# Patient Record
Sex: FEMALE | Race: WHITE | Employment: OTHER | URBAN - METROPOLITAN AREA
[De-identification: names, ages, dates, MRNs, and addresses within clinical notes are randomized per-mention and may not be internally consistent; named-entity substitution may affect disease eponyms.]

---

## 2017-01-09 ENCOUNTER — OFFICE VISIT (OUTPATIENT)
Dept: FAMILY MEDICINE | Facility: CLINIC | Age: 67
End: 2017-01-09
Payer: COMMERCIAL

## 2017-01-09 VITALS
OXYGEN SATURATION: 99 % | SYSTOLIC BLOOD PRESSURE: 112 MMHG | RESPIRATION RATE: 16 BRPM | HEART RATE: 67 BPM | HEIGHT: 58 IN | WEIGHT: 146.5 LBS | BODY MASS INDEX: 30.75 KG/M2 | DIASTOLIC BLOOD PRESSURE: 70 MMHG | TEMPERATURE: 97.8 F

## 2017-01-09 DIAGNOSIS — R79.89 INCREASED PTH LEVEL: ICD-10-CM

## 2017-01-09 DIAGNOSIS — Z98.84 BARIATRIC SURGERY STATUS: ICD-10-CM

## 2017-01-09 DIAGNOSIS — E55.9 VITAMIN D DEFICIENCY: ICD-10-CM

## 2017-01-09 DIAGNOSIS — M19.90 ARTHRITIS: ICD-10-CM

## 2017-01-09 DIAGNOSIS — R21 RASH: ICD-10-CM

## 2017-01-09 DIAGNOSIS — F11.20 UNCOMPLICATED OPIOID DEPENDENCE (H): Primary | ICD-10-CM

## 2017-01-09 DIAGNOSIS — E53.8 VITAMIN B12 DEFICIENCY WITHOUT ANEMIA: ICD-10-CM

## 2017-01-09 DIAGNOSIS — Z23 NEED FOR PNEUMOCOCCAL VACCINATION: ICD-10-CM

## 2017-01-09 DIAGNOSIS — G89.4 CHRONIC PAIN SYNDROME: ICD-10-CM

## 2017-01-09 LAB
ALBUMIN SERPL-MCNC: 3.9 G/DL (ref 3.4–5)
ALP SERPL-CCNC: 102 U/L (ref 40–150)
ALT SERPL W P-5'-P-CCNC: 17 U/L (ref 0–50)
ANION GAP SERPL CALCULATED.3IONS-SCNC: 9 MMOL/L (ref 3–14)
AST SERPL W P-5'-P-CCNC: 16 U/L (ref 0–45)
BILIRUB SERPL-MCNC: 0.7 MG/DL (ref 0.2–1.3)
BUN SERPL-MCNC: 12 MG/DL (ref 7–30)
CALCIUM SERPL-MCNC: 9.1 MG/DL (ref 8.5–10.1)
CHLORIDE SERPL-SCNC: 107 MMOL/L (ref 94–109)
CO2 SERPL-SCNC: 25 MMOL/L (ref 20–32)
CREAT SERPL-MCNC: 0.56 MG/DL (ref 0.52–1.04)
ERYTHROCYTE [DISTWIDTH] IN BLOOD BY AUTOMATED COUNT: 14 % (ref 10–15)
GFR SERPL CREATININE-BSD FRML MDRD: NORMAL ML/MIN/1.7M2
GLUCOSE SERPL-MCNC: 88 MG/DL (ref 70–99)
HCT VFR BLD AUTO: 36.2 % (ref 35–47)
HGB BLD-MCNC: 11.5 G/DL (ref 11.7–15.7)
MCH RBC QN AUTO: 29.1 PG (ref 26.5–33)
MCHC RBC AUTO-ENTMCNC: 31.8 G/DL (ref 31.5–36.5)
MCV RBC AUTO: 92 FL (ref 78–100)
PLATELET # BLD AUTO: 366 10E9/L (ref 150–450)
POTASSIUM SERPL-SCNC: 3.8 MMOL/L (ref 3.4–5.3)
PROT SERPL-MCNC: 6.8 G/DL (ref 6.8–8.8)
PTH-INTACT SERPL-MCNC: 73 PG/ML (ref 12–72)
RBC # BLD AUTO: 3.95 10E12/L (ref 3.8–5.2)
SODIUM SERPL-SCNC: 141 MMOL/L (ref 133–144)
VIT B12 SERPL-MCNC: 306 PG/ML (ref 193–986)
WBC # BLD AUTO: 4.3 10E9/L (ref 4–11)

## 2017-01-09 PROCEDURE — 99214 OFFICE O/P EST MOD 30 MIN: CPT | Mod: 25 | Performed by: FAMILY MEDICINE

## 2017-01-09 PROCEDURE — 82607 VITAMIN B-12: CPT | Mod: 90 | Performed by: FAMILY MEDICINE

## 2017-01-09 PROCEDURE — 99000 SPECIMEN HANDLING OFFICE-LAB: CPT | Performed by: FAMILY MEDICINE

## 2017-01-09 PROCEDURE — 82306 VITAMIN D 25 HYDROXY: CPT | Mod: 90 | Performed by: FAMILY MEDICINE

## 2017-01-09 PROCEDURE — 85027 COMPLETE CBC AUTOMATED: CPT | Performed by: FAMILY MEDICINE

## 2017-01-09 PROCEDURE — 80053 COMPREHEN METABOLIC PANEL: CPT | Performed by: FAMILY MEDICINE

## 2017-01-09 PROCEDURE — G0009 ADMIN PNEUMOCOCCAL VACCINE: HCPCS | Performed by: FAMILY MEDICINE

## 2017-01-09 PROCEDURE — 90670 PCV13 VACCINE IM: CPT | Performed by: FAMILY MEDICINE

## 2017-01-09 PROCEDURE — 36415 COLL VENOUS BLD VENIPUNCTURE: CPT | Performed by: FAMILY MEDICINE

## 2017-01-09 PROCEDURE — 83970 ASSAY OF PARATHORMONE: CPT | Mod: 90 | Performed by: FAMILY MEDICINE

## 2017-01-09 RX ORDER — OXYCODONE HYDROCHLORIDE 5 MG/1
5 CAPSULE ORAL EVERY 4 HOURS PRN
Qty: 60 CAPSULE | Refills: 0 | Status: SHIPPED | OUTPATIENT
Start: 2017-01-09 | End: 2017-01-09

## 2017-01-09 RX ORDER — OXYCODONE HYDROCHLORIDE 5 MG/1
5 CAPSULE ORAL EVERY 4 HOURS PRN
Qty: 60 CAPSULE | Refills: 0 | Status: SHIPPED | OUTPATIENT
Start: 2017-02-09 | End: 2017-01-09

## 2017-01-09 RX ORDER — OXYCODONE HYDROCHLORIDE 5 MG/1
5 CAPSULE ORAL EVERY 4 HOURS PRN
Qty: 60 CAPSULE | Refills: 0 | Status: SHIPPED | OUTPATIENT
Start: 2017-03-09 | End: 2017-04-06

## 2017-01-09 RX ORDER — MELOXICAM 7.5 MG/1
7.5 TABLET ORAL
Qty: 30 TABLET | Refills: 1 | Status: SHIPPED | OUTPATIENT
Start: 2017-01-09 | End: 2018-10-17 | Stop reason: SINTOL

## 2017-01-09 ASSESSMENT — PAIN SCALES - GENERAL: PAINLEVEL: SEVERE PAIN (7)

## 2017-01-09 NOTE — MR AVS SNAPSHOT
"              After Visit Summary   1/9/2017    Keiko Kimball    MRN: [id number]           Patient Information     Date Of Birth          1950        Visit Information        Provider Department      1/9/2017 10:00 AM Brittnee Sharma MD [facility]        Today's Diagnoses     Uncomplicated opioid dependence (H)    -  1     Arthritis         Bariatric surgery status         Chronic pain syndrome         Vitamin B12 deficiency without anemia         Vitamin D deficiency         Need for pneumococcal vaccination            Follow-ups after your visit        Who to contact     If you have questions or need follow up information about today's clinic visit or your schedule please contact [facility] directly at [phone].  Normal or non-critical lab and imaging results will be communicated to you by MyChart, letter or phone within 4 business days after the clinic has received the results. If you do not hear from us within 7 days, please contact the clinic through [employer] or phone. If you have a critical or abnormal lab result, we will notify you by phone as soon as possible.  Submit refill requests through [employer] or call your pharmacy and they will forward the refill request to us. Please allow 3 business days for your refill to be completed.          Additional Information About Your Visit        MyChart Information     [employer] lets you send messages to your doctor, view your test results, renew your prescriptions, schedule appointments and more. To sign up, go to [email or web address] . Click on \"Log in\" on the left side of the screen, which will take you to the Welcome page. Then click on \"Sign up Now\" on the right side of the page.     You will be asked to enter the access code listed below, as well as some personal information. Please follow the directions to create your username and password.     Your access code is: [email or web address]  Expires: 1/22/2017  9:54 AM   " "  Your access code will  in 90 days. If you need help or a new code, please call your [place] clinic or [phone].        Care EveryWhere ID     This is your Care EveryWhere ID. This could be used by other organizations to access your [place] medical records  [id number]        Your Vitals Were     Pulse Temperature Respirations Height BMI (Body Mass Index) Pulse Oximetry    67 97.8  F (36.6  C) (Oral) 16 4' 9.5\" (1.461 m) 31.13 kg/m2 99%       Blood Pressure from Last 3 Encounters:   17 112/70   10/24/16 110/72   16 100/74    Weight from Last 3 Encounters:   17 146 lb 8 oz (66.452 kg)   10/24/16 145 lb 4.8 oz (65.908 kg)   16 151 lb (68.493 kg)              We Performed the Following     CBC with platelets     Comprehensive metabolic panel     Parathyroid Hormone Intact     PNEUMOCOCCAL CONJ VACCINE 13 VALENT IM     Vitamin B12     Vitamin D Deficiency          Today's Medication Changes          These changes are accurate as of: 17 10:41 AM.  If you have any questions, ask your nurse or doctor.               Start taking these medicines.        Dose/Directions    oxyCODONE 5 MG capsule   Commonly known as:  OXY-IR   Used for:  Chronic pain syndrome   Started by:  Brittnee Sharma MD        Dose:  5 mg   Start taking on:  3/9/2017   Take 1 capsule (5 mg) by mouth every 4 hours as needed for moderate to severe pain   Quantity:  60 capsule   Refills:  0            Where to get your medicines      These medications were sent to [facility] [address] [place] [address] AT [place] [address]  [id number] [place] [id number]    Hours:  24-hours Phone:  [phone]    - meloxicam 7.5 MG tablet      Some of these will need a paper prescription and others can be bought over the counter.  Ask your nurse if you have questions.     Bring a paper prescription for each of these medications    - oxyCODONE 5 MG capsule             Primary " Care Provider Office Phone # Fax #    Brittnee Soha Sharma [phone]       [facility] [address]  [place]   [facility] [address]        Thank you!     Thank you for choosing [facility]  for your care. Our goal is always to provide you with excellent care. Hearing back from our patients is one way we can continue to improve our services. Please take a few minutes to complete the written survey that you may receive in the mail after your visit with us. Thank you!             Your Updated Medication List - Protect others around you: Learn how to safely use, store and throw away your medicines at www.disposemymeds.org.          This list is accurate as of: 1/9/17 10:41 AM.  Always use your most recent med list.                   Brand Name Dispense Instructions for use    cholecalciferol 1000 UNIT tablet    vitamin D     Take 2,000 Units by mouth 2 times daily       cyanocobalamin 1000 MCG/ML injection    VITAMIN B12    1 mL    Inject 1 mL (1,000 mcg) into the muscle every 30 days       cyclobenzaprine 10 MG tablet    FLEXERIL    180 tablet    Take 1 tablet (10 mg) by mouth nightly as needed Pt takes 10mg to 20mg as needed at bedtime       diclofenac 1 % Gel topical gel    VOLTAREN    100 g    Apply 2 g topically 4 times daily Apply 4 grams to knees or 2 grams to hands four times daily using enclosed dosing card.       lisinopril 10 MG tablet    PRINIVIL/ZESTRIL    90 tablet    Take 1 tablet (10 mg) by mouth daily       meloxicam 7.5 MG tablet    MOBIC    30 tablet    Take 1 tablet (7.5 mg) by mouth every 36 hours Or as needed, not more then 2 times per week       omeprazole 40 MG capsule    priLOSEC    90 capsule    Take 1 capsule (40 mg) by mouth daily Take 30-60 minutes before a meal.       ONE-A-DAY WITHIN Tabs      Take  by mouth 2 times daily.       order for DME     1 each    One lift-chair for mobilization to use daily.       order for DME     2 each    Equipment  being ordered:  [facility] Walking Meghana Murphy~        oxyCODONE 5 MG capsule   Start taking on:  3/9/2017    OXY-IR    60 capsule    Take 1 capsule (5 mg) by mouth every 4 hours as needed for moderate to severe pain

## 2017-01-09 NOTE — NURSING NOTE
"Chief Complaint   Patient presents with     Recheck Medication     OXYCODONE AND MOBIC      Imm/Inj     B-12 and Pneumonia 13     Initial /70 mmHg  Pulse 67  Temp(Src) 97.8  F (36.6  C) (Oral)  Resp 16  Ht 4' 9.5\" (1.461 m)  Wt 146 lb 8 oz (66.452 kg)  BMI 31.13 kg/m2  SpO2 99% Estimated body mass index is 31.13 kg/(m^2) as calculated from the following:    Height as of this encounter: 4' 9.5\" (1.461 m).    Weight as of this encounter: 146 lb 8 oz (66.452 kg).  BP completed using cuff size regular left arm.    Lisa Magill, CMA    "

## 2017-01-09 NOTE — PROGRESS NOTES
HPI   SUBJECTIVE:                                                    Keiko Kimball is a 66 year old female who presents to clinic today for the following health issues:      Chronic Pain Follow-Up       Type / Location of Pain: ALL OVER   Analgesia/pain control:       Recent changes:  worse      Overall control: Comfortably manageable  Activity level/function:      Daily activities:  Able to do all daily activities    Work:  Unable to work  Adverse effects:  No  Adherance    Taking medication as directed?  Yes    Participating in other treatments: no   Risk Factors:    Sleep:  Poor    Mood/anxiety:  controlled    Recent family or social stressors:  Yes-WHERE PATIENT LEAVES     Other aggravating factors: repetitive activities - standing   PHQ-9 SCORE 9/10/2015 10/5/2015   Total Score 0 8     NEETA-7 SCORE 9/10/2015 10/5/2015 10/24/2016   Total Score 0 0 0     Encounter-Level CSA - 7/10/15:               Controlled Substance Agreement - Scan on 7/15/2015  3:09 PM : [facility] Controlled Substance Agreement 7-10-15 (below)             Pain medication is helping. Does not want to change medications at this time. Notes pain is worse in the winter months due to weather.  Notes of joint pain in her shoulders, knees and elbows. Notes she also has rotator cuff issues. Notes she is taking her vitamin d daily. Would like to have labs drawn to check levels.        Amount of exercise or physical activity: None    Problems taking medications regularly: No    Medication side effects: none    Diet: regular (no restrictions)    PROBLEMS TO ADD ON...  Mobic: only taking once a while and will take half a tablet. She thinks she is only taking about one tablet a month.   Is still taking prilosec daily to help with stomach issues.   Stomach issues: is not having episodes of throwing up currently. Notes that her surgeon would like her to have another scope but she does not want to have this done. Reports of history of stomach  "ulcers. Is taking prilosec daily in the morning.     Problem list and histories reviewed & adjusted, as indicated.  Additional history: as documented    BP Readings from Last 3 Encounters:   01/09/17 112/70   10/24/16 110/72   06/30/16 100/74    Wt Readings from Last 3 Encounters:   01/09/17 66.452 kg (146 lb 8 oz)   10/24/16 65.908 kg (145 lb 4.8 oz)   06/30/16 68.493 kg (151 lb)                  Labs reviewed in EPIC  Problem list, Medication list, Allergies, and Medical/Social/Surgical histories reviewed in [facility] and updated as appropriate.    ROS:  CONSTITUTIONAL:NEGATIVE for fever, chills, change in weight  INTEGUMENTARY/SKIN: NEGATIVE for worrisome rashes, moles or lesions  GI: POSITIVE for Hx stomach or duodenal ulcer and vomiting  MUSCULOSKELETAL: POSITIVE  for joint pain, shoulder, knee, and elbow pain.   PSYCHIATRIC: NEGATIVE for changes in mood or affect    This document serves as a record of the services and decisions personally performed and made by Brittnee Sharma MD. It was created on her behalf by Luly Wan, a trained medical scribe. The creation of this document is based the provider's statements to the medical scribe.  Luly Wan January 9, 2017 10:27 AM    OBJECTIVE:                                                    /70 mmHg  Pulse 67  Temp(Src) 97.8  F (36.6  C) (Oral)  Resp 16  Ht 1.461 m (4' 9.5\")  Wt 66.452 kg (146 lb 8 oz)  BMI 31.13 kg/m2  SpO2 99%  Body mass index is 31.13 kg/(m^2).  GENERAL: healthy, alert and no distress  EYES: Eyes grossly normal to inspection, PERRL and conjunctivae and sclerae normal  HENT: ear canals and TM's normal, nose and mouth without ulcers or lesions  NECK: no adenopathy, no asymmetry, masses, or scars and thyroid normal to palpation  RESP: lungs clear to auscultation - no rales, rhonchi or wheezes  CV: regular rate and rhythm, normal S1 S2, no S3 or S4, no murmur, click or rub, no peripheral edema and peripheral pulses strong  MS: no gross " musculoskeletal defects noted, no edema  SKIN: no suspicious lesions or rashes  NEURO: Normal strength and tone, mentation intact and speech normal  PSYCH: mentation appears normal, affect normal/bright    Diagnostic Test Results:  none      ASSESSMENT/PLAN:                                                    (F11.20) Uncomplicated opioid dependence (H)  (primary encounter diagnosis)  Comment: Patient is dependent on oxycodone for pain. 5 mg capsules- 60 tablets per month.   Plan: Follow up as needed.     (M19.90) Arthritis  Comment: Continue with very limited use mobic.   Plan: meloxicam (MOBIC) 7.5 MG tablet, Comprehensive         metabolic panel        Follow up as needed.     (Z98.84) Bariatric surgery status  Comment: Will check vitamin levels. Continue with vitamin D daily. B12 injection today. Results can be faxed to her surgeon (Dr. Feng with [facility]).   Plan: Vitamin D Deficiency, Vitamin B12, Parathyroid         Hormone Intact, Comprehensive metabolic panel        Follow up in six months.     (G89.4) Chronic pain syndrome  Comment: Pain managed under current regime. Refilled for three months.   Plan: oxyCODONE (OXY-IR) 5 MG capsule, DISCONTINUED:         oxyCODONE (OXY-IR) 5 MG capsule, DISCONTINUED:         oxyCODONE (OXY-IR) 5 MG capsule        Follow up in three months.     (E53.8) Vitamin B12 deficiency without anemia  Comment: Will recheck levels. B12 injection today.   Plan: Vitamin B12, CBC with platelets, ADMIN 1st         VACCINE        Follow up as needed.     (E55.9) Vitamin D deficiency  Comment: Will check vit d levels. Continue with daily supplement.   Plan: Vitamin D Deficiency        Follow up as needed.     (Z23) Need for pneumococcal vaccination  Comment: Due for pneumonia vaccine.   Plan: PNEUMOCOCCAL CONJ VACCINE 13 VALENT IM            The information in this document, created by the medical scribe for me, accurately reflects the services I personally performed and the  decisions made by me. I have reviewed and approved this document for accuracy prior to leaving the patient care area.  Brittnee Sharma MD 10:27 AM 1/9/2017          Brittnee Sharma MD  [facility]      Physical Exam

## 2017-01-09 NOTE — PROGRESS NOTES
Screening Questionnaire for Adult Immunization    Are you sick today?   No   Do you have allergies to medications, food, a vaccine component or latex?   Yes   Have you ever had a serious reaction after receiving a vaccination?   No   Do you have a long-term health problem with heart disease, lung disease, asthma, kidney disease, metabolic disease (e.g. diabetes), anemia, or other blood disorder?   Yes-anemia    Do you have cancer, leukemia, HIV/AIDS, or any other immune system problem?   No   In the past 3 months, have you taken medications that affect  your immune system, such as prednisone, other steroids, or anticancer drugs; drugs for the treatment of rheumatoid arthritis, Crohn s disease, or psoriasis; or have you had radiation treatments?   No   Have you had a seizure, or a brain or other nervous system problem?   No   During the past year, have you received a transfusion of blood or blood     products, or been given immune (gamma) globulin or antiviral drug?   No   For women: Are you pregnant or is there a chance you could become        pregnant during the next month?   No   Have you received any vaccinations in the past 4 weeks?   No     Immunization questionnaire was positive for at least one answer.  Notified Dr. Sharma.      MNVFC doesn't apply on this patient    Per orders of Dr. Sharma, injection of prevnar 13 and B-12 injection given by Lisa A. Magill. Patient instructed to remain in clinic for 20 minutes afterwards, and to report any adverse reaction to me immediately.       Screening performed by Lisa A. Magill on 1/9/2017 at 11:29 AM.

## 2017-01-10 LAB — DEPRECATED CALCIDIOL+CALCIFEROL SERPL-MC: ABNORMAL UG/L (ref 20–75)

## 2017-01-19 RX ORDER — BENZOCAINE/MENTHOL 6 MG-10 MG
LOZENGE MUCOUS MEMBRANE
Qty: 15 G | Refills: 0 | Status: SHIPPED | OUTPATIENT
Start: 2017-01-19 | End: 2018-10-17

## 2017-02-03 ENCOUNTER — ALLIED HEALTH/NURSE VISIT (OUTPATIENT)
Dept: NURSING | Facility: CLINIC | Age: 67
End: 2017-02-03
Payer: COMMERCIAL

## 2017-02-03 DIAGNOSIS — Z98.84 BARIATRIC SURGERY STATUS: Primary | ICD-10-CM

## 2017-02-03 PROCEDURE — 96372 THER/PROPH/DIAG INJ SC/IM: CPT

## 2017-02-03 PROCEDURE — 99207 ZZC NO CHARGE NURSE ONLY: CPT

## 2017-03-01 ENCOUNTER — ALLIED HEALTH/NURSE VISIT (OUTPATIENT)
Dept: NURSING | Facility: CLINIC | Age: 67
End: 2017-03-01
Payer: COMMERCIAL

## 2017-03-01 DIAGNOSIS — D50.8 OTHER IRON DEFICIENCY ANEMIA: Primary | ICD-10-CM

## 2017-03-01 PROCEDURE — 96372 THER/PROPH/DIAG INJ SC/IM: CPT

## 2017-03-01 PROCEDURE — 99207 ZZC NO CHARGE NURSE ONLY: CPT

## 2017-03-01 NOTE — NURSING NOTE
"Chief Complaint   Patient presents with     Allied Health Visit     vit b12     Initial There were no vitals taken for this visit. Estimated body mass index is 31.15 kg/(m^2) as calculated from the following:    Height as of 1/9/17: 4' 9.5\" (1.461 m).    Weight as of 1/9/17: 146 lb 8 oz (66.5 kg)..  BP completed using cuff size NA (Not Taken)      Shari Schoenberger, CMA    "

## 2017-03-01 NOTE — MR AVS SNAPSHOT
"              After Visit Summary   3/1/2017    Keiko Kimball    MRN: [id number]           Patient Information     Date Of Birth          1950        Visit Information        Provider Department      3/1/2017 3:00 PM CR SILVER MA/LPN [facility]        Today's Diagnoses     Other iron deficiency anemia    -  1       Follow-ups after your visit        Your next 10 appointments already scheduled     Mar 06, 2017 12:30 PM CST   [facility] with Libert[facility] Machado   [facility] [place] [facility] ([facility] [place]  )    [address]  [address]  [place] [address]   [phone]            Apr 03, 2017 10:20 AM CDT   [facility] with Brittnee Sharma MD   [facility]    [address], [address]  [facility] [address] [phone]              Who to contact     If you have questions or need follow up information about today's clinic visit or your schedule please contact [facility] directly at [phone].  Normal or non-critical lab and imaging results will be communicated to you by [employer], letter or phone within 4 business days after the clinic has received the results. If you do not hear from us within 7 days, please contact the clinic through [employer] or phone. If you have a critical or abnormal lab result, we will notify you by phone as soon as possible.  Submit refill requests through [employer] or call your pharmacy and they will forward the refill request to us. Please allow 3 business days for your refill to be completed.          Additional Information About Your Visit        [employer] Information     [employer] lets you send messages to your doctor, view your test results, renew your prescriptions, schedule appointments and more. To sign up, go to [email or web address] . Click on \"Log in\" on the left side of the screen, which will take you to the Welcome page. Then click on \"Sign up Now\" on the right side of the page. "     You will be asked to enter the access code listed below, as well as some personal information. Please follow the directions to create your username and password.     Your access code is: [email or web address]  Expires: 2017  3:01 PM     Your access code will  in 90 days. If you need help or a new code, please call your [place] clinic or [phone].        Care EveryWhere ID     This is your Care EveryWhere ID. This could be used by other organizations to access your [place] medical records  [id number]         Blood Pressure from Last 3 Encounters:   17 112/70   10/24/16 110/72   16 100/74    Weight from Last 3 Encounters:   17 146 lb 8 oz (66.5 kg)   10/24/16 145 lb 4.8 oz (65.9 kg)   16 151 lb (68.5 kg)              We Performed the Following     INJECTION INTRAMUSCULAR OR SUB-Q     VITAMIN B12 INJ /1000MCG        Primary Care Provider Office Phone # Fax #    Brittnee Sharma [phone]       [place] [address]  [place]   [facility] [address]        Thank you!     Thank you for choosing [facility]  for your care. Our goal is always to provide you with excellent care. Hearing back from our patients is one way we can continue to improve our services. Please take a few minutes to complete the written survey that you may receive in the mail after your visit with us. Thank you!             Your Updated Medication List - Protect others around you: Learn how to safely use, store and throw away your medicines at www.disposemymeds.org.          This list is accurate as of: 3/1/17  3:01 PM.  Always use your most recent med list.                   Brand Name Dispense Instructions for use    * cholecalciferol 25299 UNITS capsule    VITAMIN D3    4 capsule    Take 1 capsule (50,000 Units) by mouth once a week       * cholecalciferol 1000 UNIT tablet    vitamin D     Take 2,000 Units by mouth 2 times daily       cyanocobalamin 1000 MCG/ML  injection    VITAMIN B12    1 mL    Inject 1 mL (1,000 mcg) into the muscle every 30 days       cyclobenzaprine 10 MG tablet    FLEXERIL    180 tablet    Take 1 tablet (10 mg) by mouth nightly as needed Pt takes 10mg to 20mg as needed at bedtime       diclofenac 1 % Gel topical gel    VOLTAREN    100 g    Apply 2 g topically 4 times daily Apply 4 grams to knees or 2 grams to hands four times daily using enclosed dosing card.       hydrocortisone 1 % cream    KP HYDROCORTISONE    15 g    Apply small amount to area as needed       lisinopril 10 MG tablet    PRINIVIL/ZESTRIL    90 tablet    Take 1 tablet (10 mg) by mouth daily       meloxicam 7.5 MG tablet    MOBIC    30 tablet    Take 1 tablet (7.5 mg) by mouth every 36 hours Or as needed, not more then 2 times per week       omeprazole 40 MG capsule    priLOSEC    90 capsule    Take 1 capsule (40 mg) by mouth daily Take 30-60 minutes before a meal.       ONE-A-DAY WITHIN Tabs      Take  by mouth 2 times daily.       order for DME     1 each    One lift-chair for mobilization to use daily.       order for DME     2 each    Equipment being ordered:  [employer] [facility] Walking Lace Shoe~        oxyCODONE 5 MG capsule   Start taking on:  3/9/2017    OXY-IR    60 capsule    Take 1 capsule (5 mg) by mouth every 4 hours as needed for moderate to severe pain       * Notice:  This list has 2 medication(s) that are the same as other medications prescribed for you. Read the directions carefully, and ask your doctor or other care provider to review them with you.

## 2017-03-06 ENCOUNTER — THERAPY VISIT (OUTPATIENT)
Dept: OCCUPATIONAL THERAPY | Facility: CLINIC | Age: 67
End: 2017-03-06
Payer: MEDICARE

## 2017-03-06 DIAGNOSIS — M79.601 PAIN OF RIGHT UPPER EXTREMITY: Primary | ICD-10-CM

## 2017-03-06 PROCEDURE — G8985 CARRY GOAL STATUS: HCPCS | Mod: GO | Performed by: OCCUPATIONAL THERAPIST

## 2017-03-06 PROCEDURE — 97165 OT EVAL LOW COMPLEX 30 MIN: CPT | Mod: GO | Performed by: OCCUPATIONAL THERAPIST

## 2017-03-06 PROCEDURE — G8984 CARRY CURRENT STATUS: HCPCS | Mod: GO | Performed by: OCCUPATIONAL THERAPIST

## 2017-03-06 PROCEDURE — 29105 APPLICATION LONG ARM SPLINT: CPT | Mod: GO | Performed by: OCCUPATIONAL THERAPIST

## 2017-03-06 NOTE — MR AVS SNAPSHOT
"              After Visit Summary   3/6/2017    Keiko Kimball    MRN: [id number]           Patient Information     Date Of Birth          1950        Visit Information        Provider Department      3/6/2017 12:30 PM Libertad Machado        Today's Diagnoses     Pain of right upper extremity    -  1       Follow-ups after your visit        Your next 10 appointments already scheduled     Apr 03, 2017 10:20 AM CDT   SHORT with Brittnee Sharma MD   [facility]    [address] [place] [address], [address]  [facility] [address] [phone]              Who to contact     If you have questions or need follow up information about today's clinic visit or your schedule please contact [facility] directly at [phone].  Normal or non-critical lab and imaging results will be communicated to you by Skoodathart, letter or phone within 4 business days after the clinic has received the results. If you do not hear from us within 7 days, please contact the clinic through MyChart or phone. If you have a critical or abnormal lab result, we will notify you by phone as soon as possible.  Submit refill requests through [facility] or call your pharmacy and they will forward the refill request to us. Please allow 3 business days for your refill to be completed.          Additional Information About Your Visit        MyChart Information     [facility] lets you send messages to your doctor, view your test results, renew your prescriptions, schedule appointments and more. To sign up, go to [email or web address] . Click on \"Log in\" on the left side of the screen, which will take you to the Welcome page. Then click on \"Sign up Now\" on the right side of the page.     You will be asked to enter the access code listed below, as well as some personal information. Please follow the directions to create your username and password.     Your access code is: " [email or web address]  Expires: 2017  3:01 PM     Your access code will  in 90 days. If you need help or a new code, please call your [facility] clinic or [phone].        Care EveryWhere ID     This is your Care EveryWhere ID. This could be used by other organizations to access your [facility] medical records  [id number]         Blood Pressure from Last 3 Encounters:   17 112/70   10/24/16 110/72   16 100/74    Weight from Last 3 Encounters:   17 66.5 kg (146 lb 8 oz)   10/24/16 65.9 kg (145 lb 4.8 oz)   16 68.5 kg (151 lb)              We Performed the Following     APPLY LONG ARM SPLINT     HC OT EVAL, LOW COMPLEXITY     [facility] INITIAL EVAL REPORT        Primary Care Provider Office Phone # Fax #    Brittnee Soha Sharma [phone]       [place] [address]  [address]   [facility] [address]        Thank you!     Thank you for choosing [facility]  for your care. Our goal is always to provide you with excellent care. Hearing back from our patients is one way we can continue to improve our services. Please take a few minutes to complete the written survey that you may receive in the mail after your visit with us. Thank you!             Your Updated Medication List - Protect others around you: Learn how to safely use, store and throw away your medicines at www.disposemymeds.org.          This list is accurate as of: 3/6/17 11:59 PM.  Always use your most recent med list.                   Brand Name Dispense Instructions for use    * cholecalciferol 16659 UNITS capsule    VITAMIN D3    4 capsule    Take 1 capsule (50,000 Units) by mouth once a week       * cholecalciferol 1000 UNIT tablet    vitamin D     Take 2,000 Units by mouth 2 times daily       cyanocobalamin 1000 MCG/ML injection    VITAMIN B12    1 mL    Inject 1 mL (1,000 mcg) into the muscle every 30 days       cyclobenzaprine 10 MG tablet    FLEXERIL    180 tablet    Take 1 tablet (10 mg) by  mouth nightly as needed Pt takes 10mg to 20mg as needed at bedtime       diclofenac 1 % Gel topical gel    VOLTAREN    100 g    Apply 2 g topically 4 times daily Apply 4 grams to knees or 2 grams to hands four times daily using enclosed dosing card.       hydrocortisone 1 % cream    KP HYDROCORTISONE    15 g    Apply small amount to area as needed       lisinopril 10 MG tablet    PRINIVIL/ZESTRIL    90 tablet    Take 1 tablet (10 mg) by mouth daily       meloxicam 7.5 MG tablet    MOBIC    30 tablet    Take 1 tablet (7.5 mg) by mouth every 36 hours Or as needed, not more then 2 times per week       omeprazole 40 MG capsule    priLOSEC    90 capsule    Take 1 capsule (40 mg) by mouth daily Take 30-60 minutes before a meal.       ONE-A-DAY WITHIN Tabs      Take  by mouth 2 times daily.       order for DME     1 each    One lift-chair for mobilization to use daily.       order for DME     2 each    Equipment being ordered:  [facility] Walking Lace Shoe~        oxyCODONE 5 MG capsule   Start taking on:  3/9/2017    OXY-IR    60 capsule    Take 1 capsule (5 mg) by mouth every 4 hours as needed for moderate to severe pain       * Notice:  This list has 2 medication(s) that are the same as other medications prescribed for you. Read the directions carefully, and ask your doctor or other care provider to review them with you.

## 2017-03-06 NOTE — LETTER
DEPARTMENT OF HEALTH AND HUMAN SERVICES  CENTERS FOR MEDICARE & MEDICAID SERVICES    PLAN/UPDATED PLAN OF PROGRESS FOR OUTPATIENT REHABILITATION    PATIENTS NAME:  Keiko Kimball   : 1950  PR[facility] NUMBER:  [id number]  [id number]  PROVIDER NAME: [facility]  MEDICAL RECORD NUMBER: [id number]   START OF CARE DATE:    SOC Date: 17   TYPE:  OT  PRIMARY/TREATMENT DIAGNOSIS: (Pertinent Medical Diagnosis)  Pain of right upper extremity    VISITS FROM START OF CARE:  Rxs Used: 1     Hand Therapy Initial Evaluation  Current Date:  3/6/17    Diagnosis:   R arm pain  DOI:  16 (MD order date)    Subjective:  Keiko Kimball is a 66 year old right hand dominant female.    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 Since onset symptoms are unchanged.  Special tests:  x-ray.  Previous treatment: hand therapy and splinting.    General health as reported by patient is fair.  Pertinent medical history includes: osteoporosis, history of fractures, fibromyalgia.    Medical allergies: See EMR.  Surgical history: heart,  many to the RUE, shoulder replacement, radial nerve palsy and transfers, elbow prosthesis.  Medication history: see EMR.  Current occupation is disabled   Barriers include:requires assistance with dressing, personal hygiene, transfers, mobility  Prior functional level:  independent-have help in some areas  Red flags:  none  Additional Occupational Profile Information (patterns of daily living, interests, values and needs): Pt is able to use the right hand some to assist, but otherwise norris not use her right arm for daily tasks.  Upper Extremity Functional Index Score:  SCORE:   Column Totals: /80: 31   (A lower score indicates greater disability.)    Objective:  Pain:   VAS (0-10) 3/6/17    At Rest:  4-5/10    With Use:  4-5/10            PATIENTS  NAME:  Keiko Kimball   : 1950    Location: right lateral elbow  Description: aching and sharp  Radiates: both proximally and distally  What Exacerbates Pain?: elbow/arm movement  Worse (D/N):daytime and nighttime  Frequency:daily  Relieves: rest    ROM: not assessed, pt reports her right arm is not functional and has no active motion, pt does have full finger AROM    Strength: contraindicated due to lack of ROM    Assessment/Plan:  Patient presents with symptoms consistent with diagnosis of right arm pain, with surgical  intervention.     Rehab Potential:  Good - Return to full activity, some limitations    Patient's limitations or Problem List includes:  Pain and Decreased ROM/motion of the right elbow which interferes with the patient's ability to perform Self Care Tasks (dressing, eating), Household Chores and Driving  as compared to previous level of function.    Patient will benefit from skilled Occupational Therapy to decrease pain to return to previous activity level and resume normal daily tasks and to reach their rehab potential.    Barriers to Learning:  No barrier    Communication Issues:  Patient appears to be able to clearly communicate and understand verbal and written communication and follow directions correctly.    Assessment of Occupational Performance:  3-5 Performance Deficits  Identified Performance Deficits: bathing/showering, dressing, health management and maintenance, home establishment and management and work      Clinical Decision Making (Complexity): Low complexity    Treatment Explanation:  The following has been discussed with the patient:  RX ordered/plan of care  Anticipated outcomes  Possible risks and side effects    P: Frequency:  1 x visit  Duration:  NA; 1 x visit, Pt to continue HEP individually.  Pt to return for splint adjustments.  If not return, D/C [facility]    Treatment Plan and HEP:  Orthotic Fabrication:  Long Arm dorsal resting orthosis              PATIENTS NAME:   "Keiko Kimball   : 1950      Discharge Plan:  Achieve all LTG.  Independent in home treatment program.  Reach maximal therapeutic benefit.                Caregiver Signature/Credentials ______________________________ Date ________      Treating Provider: BRIANNA Ludwig, CHT    I have reviewed and certified the need for these services and plan of treatment while under my care.       PHYSICIAN'S SIGNATURE:   ___________________________________ Date___________      Brittnee Sharma    Certification period: Beginning of Cert date period: 17 End of Cert period date: 17     Functional Level Progress Report: Please see attached \"Goal Flow sheet for Functional level.\"    ___X_____ Continue Services or       ________ DC Services                Service dates: SOC Date: 17  to present                                                                     "

## 2017-03-07 PROBLEM — M79.601 PAIN OF RIGHT UPPER EXTREMITY: Status: ACTIVE | Noted: 2017-03-07

## 2017-03-07 NOTE — PROGRESS NOTES
Hand Therapy Initial Evaluation  Current Date:  3/6/17    Diagnosis:   R arm pain  DOI:  9/27/16 (MD order date)    Subjective:  Keiko Kimball is a 66 year old right hand dominant female.    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 Since onset symptoms are unchanged.  Special tests:  x-ray.  Previous treatment: hand therapy and splinting.    General health as reported by patient is fair.  Pertinent medical history includes: osteoporosis, history of fractures, fibromyalgia.    Medical allergies: See EMR.  Surgical history: heart,  many to the RUE, shoulder replacement, radial nerve palsy and transfers, elbow prosthesis.  Medication history: see EMR.  Current occupation is disabled   Barriers include:requires assistance with dressing, personal hygiene, transfers, mobility  Prior functional level:  independent-have help in some areas  Red flags:  none    Additional Occupational Profile Information (patterns of daily living, interests, values and needs): Pt is able to use the right hand some to assist, but otherwise norris not use her right arm for daily tasks.    Upper Extremity Functional Index Score:  SCORE:   Column Totals: /80: 31   (A lower score indicates greater disability.)    Objective:  Pain:   VAS (0-10) 3/6/17    At Rest:  4-5/10    With Use:  4-5/10      Location: right lateral elbow  Description: aching and sharp  Radiates: both proximally and distally  What Exacerbates Pain?: elbow/arm movement  Worse (D/N):daytime and nighttime  Frequency:daily  Relieves: rest    ROM: not assessed, pt reports her right arm is not functional and has no active motion, pt does have full finger AROM    Strength: contraindicated due to lack of ROM    Assessment/Plan:  Patient presents with symptoms consistent with diagnosis of right arm pain, with surgical  intervention.     Rehab  Potential:  Good - Return to full activity, some limitations    Patient's limitations or Problem List includes:  Pain and Decreased ROM/motion of the right elbow which interferes with the patient's ability to perform Self Care Tasks (dressing, eating), Household Chores and Driving  as compared to previous level of function.    Patient will benefit from skilled Occupational Therapy to decrease pain to return to previous activity level and resume normal daily tasks and to reach their rehab potential.    Barriers to Learning:  No barrier    Communication Issues:  Patient appears to be able to clearly communicate and understand verbal and written communication and follow directions correctly.    Assessment of Occupational Performance:  3-5 Performance Deficits  Identified Performance Deficits: bathing/showering, dressing, health management and maintenance, home establishment and management and work      Clinical Decision Making (Complexity): Low complexity    Treatment Explanation:  The following has been discussed with the patient:  RX ordered/plan of care  Anticipated outcomes  Possible risks and side effects    P: Frequency:  1 x visit  Duration:  NA; 1 x visit, Pt to continue HEP individually.  Pt to return for splint adjustments.  If not return, D/C [facility]    Treatment Plan and HEP:  Orthotic Fabrication:  Long Arm dorsal resting orthosis    Discharge Plan:  Achieve all LTG.  Independent in home treatment program.  Reach maximal therapeutic benefit.    Please refer to the daily flowsheet for treatment today and total treatment time.

## 2017-04-06 ENCOUNTER — OFFICE VISIT (OUTPATIENT)
Dept: FAMILY MEDICINE | Facility: CLINIC | Age: 67
End: 2017-04-06
Payer: COMMERCIAL

## 2017-04-06 VITALS
DIASTOLIC BLOOD PRESSURE: 80 MMHG | SYSTOLIC BLOOD PRESSURE: 130 MMHG | BODY MASS INDEX: 29.81 KG/M2 | WEIGHT: 140.2 LBS | HEART RATE: 79 BPM | OXYGEN SATURATION: 98 % | RESPIRATION RATE: 16 BRPM | TEMPERATURE: 98.5 F

## 2017-04-06 DIAGNOSIS — R79.89 INCREASED PTH LEVEL: ICD-10-CM

## 2017-04-06 DIAGNOSIS — E53.8 VITAMIN B12 DEFICIENCY WITHOUT ANEMIA: ICD-10-CM

## 2017-04-06 DIAGNOSIS — K27.9 PEPTIC ULCER: ICD-10-CM

## 2017-04-06 DIAGNOSIS — G90.511 COMPLEX REGIONAL PAIN SYNDROME TYPE 1 OF RIGHT UPPER EXTREMITY: Primary | ICD-10-CM

## 2017-04-06 DIAGNOSIS — E55.9 VITAMIN D DEFICIENCY: ICD-10-CM

## 2017-04-06 DIAGNOSIS — G89.4 CHRONIC PAIN SYNDROME: ICD-10-CM

## 2017-04-06 DIAGNOSIS — F11.20 UNCOMPLICATED OPIOID DEPENDENCE (H): ICD-10-CM

## 2017-04-06 PROCEDURE — 83970 ASSAY OF PARATHORMONE: CPT | Performed by: FAMILY MEDICINE

## 2017-04-06 PROCEDURE — 99213 OFFICE O/P EST LOW 20 MIN: CPT | Mod: 25 | Performed by: FAMILY MEDICINE

## 2017-04-06 PROCEDURE — 36415 COLL VENOUS BLD VENIPUNCTURE: CPT | Performed by: FAMILY MEDICINE

## 2017-04-06 PROCEDURE — 90471 IMMUNIZATION ADMIN: CPT | Performed by: FAMILY MEDICINE

## 2017-04-06 RX ORDER — OXYCODONE HYDROCHLORIDE 5 MG/1
5 CAPSULE ORAL EVERY 4 HOURS PRN
Qty: 60 CAPSULE | Refills: 0 | Status: SHIPPED | OUTPATIENT
Start: 2017-05-06 | End: 2017-04-06

## 2017-04-06 RX ORDER — OMEPRAZOLE 40 MG/1
40 CAPSULE, DELAYED RELEASE ORAL DAILY
Qty: 90 CAPSULE | Refills: 3 | Status: SHIPPED | OUTPATIENT
Start: 2017-04-06 | End: 2017-04-06

## 2017-04-06 RX ORDER — OMEPRAZOLE 40 MG/1
40 CAPSULE, DELAYED RELEASE ORAL
Qty: 180 CAPSULE | Refills: 3 | Status: SHIPPED | OUTPATIENT
Start: 2017-04-06 | End: 2018-08-31

## 2017-04-06 RX ORDER — OXYCODONE HYDROCHLORIDE 5 MG/1
5 CAPSULE ORAL EVERY 4 HOURS PRN
Qty: 60 CAPSULE | Refills: 0 | Status: SHIPPED | OUTPATIENT
Start: 2017-04-06 | End: 2017-04-06

## 2017-04-06 RX ORDER — CYANOCOBALAMIN 1000 UG/ML
1 INJECTION, SOLUTION INTRAMUSCULAR; SUBCUTANEOUS
Qty: 1 ML | Refills: 11 | Status: SHIPPED | OUTPATIENT
Start: 2017-04-06 | End: 2018-05-01 | Stop reason: ALTCHOICE

## 2017-04-06 RX ORDER — OXYCODONE HYDROCHLORIDE 5 MG/1
5 CAPSULE ORAL EVERY 4 HOURS PRN
Qty: 60 CAPSULE | Refills: 0 | Status: SHIPPED | OUTPATIENT
Start: 2017-06-06 | End: 2017-07-03

## 2017-04-06 ASSESSMENT — PAIN SCALES - GENERAL: PAINLEVEL: SEVERE PAIN (7)

## 2017-04-06 NOTE — PROGRESS NOTES
HPI   SUBJECTIVE:                                                    Keiko Kimball is a 66 year old female who presents to clinic today for the following health issues:      Chronic Pain Follow-Up       Type / Location of Pain: ALL OVER   Analgesia/pain control:       Recent changes:  worse      Overall control: Tolerable with discomfort  Activity level/function:      Daily activities:  Able to do all daily activities    Work:  Unable to work  Adverse effects:  No  Adherance    Taking medication as directed?  Yes    Participating in other treatments: NO  Risk Factors:    Sleep:  Poor    Mood/anxiety:  slightly worsened    Recent family or social stressors:  none noted    Other aggravating factors: prolonged sitting, prolonged standing, poor posture and repetitive activities - choirs around the house  PHQ-9 SCORE 9/10/2015 10/5/2015   Total Score 0 8     NEETA-7 SCORE 9/10/2015 10/5/2015 10/24/2016   Total Score 0 0 0     Encounter-Level CSA - 07/10/2015:                 Controlled Substance Agreement - Scan on 7/15/2015  3:09 PM : [facility] Controlled Substance Agreement 7-10-15 (below)                 Amount of exercise or physical activity: 2-3 days/week for an average of 45-60 minutes    Problems taking medications regularly: No    Medication side effects: none    Diet: regular (no restrictions)      PROBLEMS TO ADD ON...chronic vomitting and will vomit occassionaly, some times daily, or not for 3 weeks, taking prilosec daily. Says she lost another 6 #.  Some foods she can keep down. Lasagna caused vomitting, creamed potatoe soup.      b12 def, gets monthly shots, lab last done in jan.    Problem list and histories reviewed & adjusted, as indicated.  Additional history: as documented    BP Readings from Last 3 Encounters:   04/06/17 130/80   01/09/17 112/70   10/24/16 110/72    Wt Readings from Last 3 Encounters:   04/06/17 140 lb 3.2 oz (63.6 kg)   01/09/17 146 lb 8 oz (66.5 kg)   10/24/16 145 lb 4.8 oz  (65.9 kg)                  Labs reviewed in EPIC    Reviewed and updated as needed this visit by clinical staff  Tobacco  Allergies  Meds  Med Hx  Surg Hx  Fam Hx  Soc Hx      Reviewed and updated as needed this visit by Provider         ROS:  C: NEGATIVE for fever, chills, change in weight  E/M: NEGATIVE for ear, mouth and throat problems  R: NEGATIVE for significant cough or SOB  CV: NEGATIVE for chest pain, palpitations or peripheral edema    OBJECTIVE:                                                    /80 (BP Location: Left arm, Patient Position: Chair, Cuff Size: Adult Regular)  Pulse 79  Temp 98.5  F (36.9  C) (Oral)  Resp 16  Wt 140 lb 3.2 oz (63.6 kg)  SpO2 98%  BMI 29.81 kg/m2  Body mass index is 29.81 kg/(m^2).  GENERAL: healthy, alert and no distress  EYES: Eyes grossly normal to inspection, PERRL and conjunctivae and sclerae normal  MS: no gross musculoskeletal defects noted, no edema  SKIN: no suspicious lesions or rashes  PSYCH: mentation appears normal, affect normal/bright         ASSESSMENT/PLAN:                                                            1. Complex regional pain syndrome type 1 of right upper extremity  Chronic pain, doing well on pain med, cont same    2. Peptic ulcer  Pt wanting to try the prilosec BID, see if this helps the random vomitting  - omeprazole (PRILOSEC) 40 MG capsule; Take 1 capsule (40 mg) by mouth 2 times daily Take 30-60 minutes before a meal.  Dispense: 180 capsule; Refill: 3    3. Uncomplicated opioid dependence (H)  Cont same, stable    4. Chronic pain syndrome  See above  - oxyCODONE (OXY-IR) 5 MG capsule; Take 1 capsule (5 mg) by mouth every 4 hours as needed for moderate to severe pain  Dispense: 60 capsule; Refill: 0    5. Increased PTH level  Recheck today  - Parathyroid Hormone Intact    6. Vitamin D deficiency  Pt declined lab check, keep taking 2000 vit D3 BID    7. Vitamin B12 deficiency without anemia  Shot today, labs  Checked  atleast yearly  - cyanocobalamin (VITAMIN B12) 1000 MCG/ML injection; Inject 1 mL (1,000 mcg) into the muscle every 30 days  Dispense: 1 mL; Refill: 11    Work on diet, eating enough calories and protein, keep track of problem foods    Brittnee Sharma MD  [facility]      Physical Exam

## 2017-04-06 NOTE — NURSING NOTE
"Chief Complaint   Patient presents with     Recheck Medication     Imm/Inj     b-12 INJECTION     Pain     CHRONIC      Initial /80 (BP Location: Left arm, Patient Position: Chair, Cuff Size: Adult Regular)  Pulse 79  Temp 98.5  F (36.9  C) (Oral)  Resp 16  Wt 140 lb 3.2 oz (63.6 kg)  SpO2 98%  BMI 29.81 kg/m2 Estimated body mass index is 29.81 kg/(m^2) as calculated from the following:    Height as of 1/9/17: 4' 9.5\" (1.461 m).    Weight as of this encounter: 140 lb 3.2 oz (63.6 kg).  BP completed using cuff size regular LEFT arm.    Lisa Magill, CMA    "

## 2017-04-06 NOTE — MR AVS SNAPSHOT
"              After Visit Summary   4/6/2017    Keiko Kimball    MRN: [id number]           Patient Information     Date Of Birth          1950        Visit Information        Provider Department      4/6/2017 2:00 PM Brittnee Sharma MD [facility]        Today's Diagnoses     Complex regional pain syndrome type 1 of right upper extremity    -  1    Peptic ulcer        Uncomplicated opioid dependence (H)        Chronic pain syndrome        Increased PTH level        Vitamin D deficiency           Follow-ups after your visit        Follow-up notes from your care team     Return in about 3 months (around 7/6/2017).      Who to contact     If you have questions or need follow up information about today's clinic visit or your schedule please contact [facility] directly at [phone].  Normal or non-critical lab and imaging results will be communicated to you by MyChart, letter or phone within 4 business days after the clinic has received the results. If you do not hear from us within 7 days, please contact the clinic through MyChart or phone. If you have a critical or abnormal lab result, we will notify you by phone as soon as possible.  Submit refill requests through Ech[employer]it or call your pharmacy and they will forward the refill request to us. Please allow 3 business days for your refill to be completed.          Additional Information About Your Visit        MyChart Information     Echobit lets you send messages to your doctor, view your test results, renew your prescriptions, schedule appointments and more. To sign up, go to [email or web address] . Click on \"Log in\" on the left side of the screen, which will take you to the Welcome page. Then click on \"Sign up Now\" on the right side of the page.     You will be asked to enter the access code listed below, as well as some personal information. Please follow the directions to create your username and password.     Your " access code is: [email or web address]  Expires: 2017  4:01 PM     Your access code will  in 90 days. If you need help or a new code, please call your [facility] or [phone].        Care EveryWhere ID     This is your Care EveryWhere ID. This could be used by other organizations to access your [facility] medical records  [id number]        Your Vitals Were     Pulse Temperature Respirations Pulse Oximetry BMI (Body Mass Index)       79 98.5  F (36.9  C) (Oral) 16 98% 29.81 kg/m2        Blood Pressure from Last 3 Encounters:   17 130/80   17 112/70   10/24/16 110/72    Weight from Last 3 Encounters:   17 140 lb 3.2 oz (63.6 kg)   17 146 lb 8 oz (66.5 kg)   10/24/16 145 lb 4.8 oz (65.9 kg)              We Performed the Following     Parathyroid Hormone Intact          Today's Medication Changes          These changes are accurate as of: 17  2:25 PM.  If you have any questions, ask your nurse or doctor.               Start taking these medicines.        Dose/Directions    omeprazole 40 MG capsule   Commonly known as:  priLOSEC   Used for:  Peptic ulcer   Started by:  Brittnee Sharma MD        Dose:  40 mg   Take 1 capsule (40 mg) by mouth 2 times daily Take 30-60 minutes before a meal.   Quantity:  180 capsule   Refills:  3       oxyCODONE 5 MG capsule   Commonly known as:  OXY-IR   Used for:  Chronic pain syndrome   Started by:  Brittnee Sharma MD        Dose:  5 mg   Start taking on:  2017   Take 1 capsule (5 mg) by mouth every 4 hours as needed for moderate to severe pain   Quantity:  60 capsule   Refills:  0            Where to get your medicines      These medications were sent to [facility] [address] [place] [address] [id number] [place] [address] AT [place] [address]  [address] [facility] [address]    Hours:  24-hours Phone:  [phone]     omeprazole 40 MG capsule         Some of these will need a paper prescription and others can be  bought over the counter.  Ask your nurse if you have questions.     Bring a paper prescription for each of these medications     oxyCODONE 5 MG capsule                Primary Care Provider Office Phone # Fax #    Brittnee Sharma [phone]       [facility] [address]  [address]   [facility] [address]        Thank you!     Thank you for choosing [facility]  for your care. Our goal is always to provide you with excellent care. Hearing back from our patients is one way we can continue to improve our services. Please take a few minutes to complete the written survey that you may receive in the mail after your visit with us. Thank you!             Your Updated Medication List - Protect others around you: Learn how to safely use, store and throw away your medicines at www.disposemymeds.org.          This list is accurate as of: 4/6/17  2:25 PM.  Always use your most recent med list.                   Brand Name Dispense Instructions for use    * cholecalciferol 26177 UNITS capsule    VITAMIN D3    4 capsule    Take 1 capsule (50,000 Units) by mouth once a week       * cholecalciferol 1000 UNIT tablet    vitamin D     Take 2,000 Units by mouth 2 times daily       cyanocobalamin 1000 MCG/ML injection    VITAMIN B12    1 mL    Inject 1 mL (1,000 mcg) into the muscle every 30 days       cyclobenzaprine 10 MG tablet    FLEXERIL    180 tablet    Take 1 tablet (10 mg) by mouth nightly as needed Pt takes 10mg to 20mg as needed at bedtime       diclofenac 1 % Gel topical gel    VOLTAREN    100 g    Apply 2 g topically 4 times daily Apply 4 grams to knees or 2 grams to hands four times daily using enclosed dosing card.       hydrocortisone 1 % cream    KP HYDROCORTISONE    15 g    Apply small amount to area as needed       lisinopril 10 MG tablet    PRINIVIL/ZESTRIL    90 tablet    Take 1 tablet (10 mg) by mouth daily       meloxicam 7.5 MG tablet    MOBIC    30 tablet    Take 1  tablet (7.5 mg) by mouth every 36 hours Or as needed, not more then 2 times per week       omeprazole 40 MG capsule    priLOSEC    180 capsule    Take 1 capsule (40 mg) by mouth 2 times daily Take 30-60 minutes before a meal.       ONE-A-DAY WITHIN Tabs      Take  by mouth 2 times daily.       order for DME     1 each    One lift-chair for mobilization to use daily.       order for DME     2 each    Equipment being ordered:  [facility] [employer] [facility] Walking Lace Shoe~        oxyCODONE 5 MG capsule   Start taking on:  6/6/2017    OXY-IR    60 capsule    Take 1 capsule (5 mg) by mouth every 4 hours as needed for moderate to severe pain       * Notice:  This list has 2 medication(s) that are the same as other medications prescribed for you. Read the directions carefully, and ask your doctor or other care provider to review them with you.

## 2017-04-07 LAB — PTH-INTACT SERPL-MCNC: 86 PG/ML (ref 12–72)

## 2017-04-17 ENCOUNTER — OFFICE VISIT (OUTPATIENT)
Dept: ENDOCRINOLOGY | Facility: CLINIC | Age: 67
End: 2017-04-17
Payer: COMMERCIAL

## 2017-04-17 VITALS
RESPIRATION RATE: 12 BRPM | WEIGHT: 143 LBS | SYSTOLIC BLOOD PRESSURE: 120 MMHG | HEART RATE: 98 BPM | TEMPERATURE: 97.8 F | BODY MASS INDEX: 30.41 KG/M2 | DIASTOLIC BLOOD PRESSURE: 80 MMHG

## 2017-04-17 DIAGNOSIS — M81.0 OSTEOPOROSIS: ICD-10-CM

## 2017-04-17 DIAGNOSIS — Z98.84 HISTORY OF ROUX-EN-Y GASTRIC BYPASS: ICD-10-CM

## 2017-04-17 DIAGNOSIS — E21.3 HYPERPARATHYROIDISM (H): Primary | ICD-10-CM

## 2017-04-17 DIAGNOSIS — Z86.39 HISTORY OF HYPOTHYROIDISM: ICD-10-CM

## 2017-04-17 DIAGNOSIS — E55.9 VITAMIN D DEFICIENCY: ICD-10-CM

## 2017-04-17 PROCEDURE — 99204 OFFICE O/P NEW MOD 45 MIN: CPT | Performed by: CLINICAL NURSE SPECIALIST

## 2017-04-17 NOTE — PATIENT INSTRUCTIONS
Increase vitamin D to 6-8 capsules per day.  This is equal to a little more than 50,000 IU weekly.    I would like you to follow up in 3 months for a recheck.    The first goal is to increase vitamin D which will help normalize the PTH.   A normal or even a decrease in your PTH will help protect you bones.    I would like to repeat the bone density in the future to make sure your bone density is not substantially worse.  There is an alternative treatment for the osteoporosis - called Prolia.  We can discuss it in more detail in the future.    Please follow up in 3 months.  Contact me if you can't increase the D dose beyond your current dose of 4 capsules today.    Calcium intake - recommended dose is about 1200 mg/day - 500 mg three times per day or 600 mg twice daily or   4 servings of dairy per day -   Each dairy serving = 1 ounce of cheese, 8 ounces of milk, 16 ounces of cottage cheese.     I'll see in 3 months.    Keiko Long NP  Endocrinology

## 2017-04-17 NOTE — MR AVS SNAPSHOT
After Visit Summary   4/17/2017    Keiko Kimball    MRN: [id number]           Patient Information     Date Of Birth          1950        Visit Information        Provider Department      4/17/2017 2:30 PM Keiko Long APRN CNP [facility]        Care Instructions        Increase vitamin D to 6-8 capsules per day.  This is equal to a little more than 50,000 IU weekly.    I would like you to follow up in 3 months for a recheck.    The first goal is to increase vitamin D which will help normalize the PTH.   A normal or even a decrease in your PTH will help protect you bones.    I would like to repeat the bone density in the future to make sure your bone density is not substantially worse.  There is an alternative treatment for the osteoporosis - called Prolia.  We can discuss it in more detail in the future.    Please follow up in 3 months.  Contact me if you can't increase the D dose beyond your current dose of 4 capsules today.    Calcium intake - recommended dose is about 1200 mg/day - 500 mg three times per day or 600 mg twice daily or   4 servings of dairy per day -   Each dairy serving = 1 ounce of cheese, 8 ounces of milk, 16 ounces of cottage cheese.     I'll see in 3 months.    Keiko Long NP  Endocrinology          Follow-ups after your visit        Who to contact     If you have questions or need follow up information about today's clinic visit or your schedule please contact [facility] directly at [phone].  Normal or non-critical lab and imaging results will be communicated to you by MyChart, letter or phone within 4 business days after the clinic has received the results. If you do not hear from us within 7 days, please contact the clinic through MyChart or phone. If you have a critical or abnormal lab result, we will notify you by phone as soon as possible.  Submit refill requests through [employer] or call your pharmacy and they will  "forward the refill request to us. Please allow 3 business days for your refill to be completed.          Additional Information About Your Visit        MyChart Information     [employer] lets you send messages to your doctor, view your test results, renew your prescriptions, schedule appointments and more. To sign up, go to [email or web address] . Click on \"Log in\" on the left side of the screen, which will take you to the Welcome page. Then click on \"Sign up Now\" on the right side of the page.     You will be asked to enter the access code listed below, as well as some personal information. Please follow the directions to create your username and password.     Your access code is: [email or web address]  Expires: 2017  4:01 PM     Your access code will  in 90 days. If you need help or a new code, please call your [facility] clinic or [phone].        Care EveryWhere ID     This is your [facility] EveryWhere ID. This could be used by other organizations to access your [facility] medical records  [id number]        Your Vitals Were     Pulse Temperature Respirations BMI (Body Mass Index)          98 97.8  F (36.6  C) (Oral) 12 30.41 kg/m2         Blood Pressure from Last 3 Encounters:   17 120/80   17 130/80   17 112/70    Weight from Last 3 Encounters:   17 143 lb (64.9 kg)   17 140 lb 3.2 oz (63.6 kg)   17 146 lb 8 oz (66.5 kg)              Today, you had the following     No orders found for display       Primary Care Provider Office Phone # Fax #    Brittnee Sharma [phone]       [facility] [address]  [address]   [facility] [address]        Thank you!     Thank you for choosing [facility]  for your care. Our goal is always to provide you with excellent care. Hearing back from our patients is one way we can continue to improve our services. Please take a few minutes to complete the written survey that you may receive in the mail " after your visit with us. Thank you!             Your Updated Medication List - Protect others around you: Learn how to safely use, store and throw away your medicines at www.disposemymeds.org.          This list is accurate as of: 4/17/17  3:12 PM.  Always use your most recent med list.                   Brand Name Dispense Instructions for use    * cholecalciferol 34843 UNITS capsule    VITAMIN D3    4 capsule    Take 1 capsule (50,000 Units) by mouth once a week       * cholecalciferol 1000 UNIT tablet    vitamin D     Take 2,000 Units by mouth 2 times daily       cyanocobalamin 1000 MCG/ML injection    VITAMIN B12    1 mL    Inject 1 mL (1,000 mcg) into the muscle every 30 days       cyclobenzaprine 10 MG tablet    FLEXERIL    180 tablet    Take 1 tablet (10 mg) by mouth nightly as needed Pt takes 10mg to 20mg as needed at bedtime       diclofenac 1 % Gel topical gel    VOLTAREN    100 g    Apply 2 g topically 4 times daily Apply 4 grams to knees or 2 grams to hands four times daily using enclosed dosing card.       hydrocortisone 1 % cream    KP HYDROCORTISONE    15 g    Apply small amount to area as needed       lisinopril 10 MG tablet    PRINIVIL/ZESTRIL    90 tablet    Take 1 tablet (10 mg) by mouth daily       meloxicam 7.5 MG tablet    MOBIC    30 tablet    Take 1 tablet (7.5 mg) by mouth every 36 hours Or as needed, not more then 2 times per week       omeprazole 40 MG capsule    priLOSEC    180 capsule    Take 1 capsule (40 mg) by mouth 2 times daily Take 30-60 minutes before a meal.       ONE-A-DAY WITHIN Tabs      Take  by mouth 2 times daily.       order for DME     1 each    One lift-chair for mobilization to use daily.       order for DME     2 each    Equipment being ordered:  [facility] Walking Lace Shoe~        oxyCODONE 5 MG capsule   Start taking on:  6/6/2017    OXY-IR    60 capsule    Take 1 capsule (5 mg) by mouth every 4 hours as needed for moderate to severe pain        * Notice:  This list has 2 medication(s) that are the same as other medications prescribed for you. Read the directions carefully, and ask your doctor or other care provider to review them with you.

## 2017-04-17 NOTE — PROGRESS NOTES
Name: Keiko Kimball  Seen at the request of Brittnee Sharma for hyperPTH.  HPI:  Keiko Kimball is a 66 year old female who presents for the evaluation of hyperPTH  .  History of Cancer: No  Thiazide Diuretic: No  Lithium use: No  Family History of pituitary adenoma, pancreas tumors, Zollinger-Henderson syndrome, pheochromocytoma. None known  Kidney stones: No  Fractures: Yes, right femur fracture 10+ years ago-states she doesn't know how she fx her femur.  Right humerus/elbow fracture. + history of spinal compression fx.   Abdominal Pain: Yes, frequent, unprovoked vomiting  Cramps: No  Constipation: not as long as she drinks coffee  Muscle Aches or pains: Yes, chronic  Muscle twitches: No  Nausea and vomiting: Yes, vomiting but no nausea  Loss of appetite: Yes  Excessive thirst: No  Frequent urination: Not really  Muscle weakness: Yes, felt due to deconditioning  Confusion Lethargy and fatigue: No  2.  Vitamin D Deficiency.  Currently treated with D3, 2000 IU bid.  States she can't tolerate the prescription D, 50K weekly.  3.  History of gastic bypass. Yoandy-en-Y in 2001.  Highest weight before bypass was 250 lbs, now at lowest weight 140's.    4.  Osteoporosis.  Last BMD 2012.  History of multiple fractures. Treated with Actonel for about 6 months, caused difficulty swallowing.  Also treated with IV Boniva for about 3-4 years [id number], feels problems with vomiting started after/ due to Boniva treatments.  Reports history of hypothyroidism diagnosed as a child.  States she was not good about taking thyroid medicine but hypothyroidism resolved by age 20 with no recurrence.  PMH/PSH:  Past Medical History:   Diagnosis Date     Anesthesia complication     cornea scratched left eye     Atypical face pain      Bariatric surgery status 2001    initial surgery staples in 1981, revised in 2001     Better eye: severe vision impairment; lesser eye: blind, 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     Rt side of face      Bisphosphonate-related jaw necrosis (H)      Carpal tunnel syndrome      Carpal tunnel syndrome 1998    Both MCP jts of thumbs removed     Fibromyalgia      Gastro-oesophageal reflux disease      Generalized osteoarthrosis, unspecified site      Macular degeneration ~January 2014    LEFT     Myalgia and myositis, unspecified      Osteoarthritis      Other and unspecified disc disorder of lumbar region      Other osteoporosis      Pain, arm, right     LIMITED MOBILITY OF RIGHT ARM     Peptic ulcer, unspecified site, unspecified as acute or chronic, without mention of hemorrhage or perforation 2001    pt had ulcer found during bariatric surgery     Pseudogout      Reflex sympathetic dystrophy of the upper limb     BILATERAL- HISTORY     Reflex sympathetic dystrophy, unspecified     bilateral     Sciatica     Both lower etm     Seasonal allergic rhinitis      Torn rotator cuff     LEFT     Trigger finger (acquired)     thumb bilateral     Ulcer of esophagus     As per pt     Unspecified hearing loss     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     Unspecified vitamin D deficiency 5/2007    Pt has low vit d, high PTH, causing osteoporosis, fractures     Past Surgical History:   Procedure Laterality Date     ARTHROPLASTY ELBOW  3/8/2012    Procedure:ARTHROPLASTY ELBOW; Right Total Elbow Arthroplasty Complex, Right Metal Revision; Surgeon:DONA COLUNGA; Location:[facility]     C APPENDECTOMY       C EXCIS KNEE CARTILAGE,MEDIAL & LAT  1994    Lt knee     C FOOT/TOES SURGERY PROC UNLISTED  1995    Lt foot , tendon repair     C LIGATE ETHMOID ARTERY  1997    developed facial pain syndrome post surgeries     C LIGATE INTERN MAXILL ARTERY  1997     C SHOULDER SURG PROC UNLISTED  8/2006    Rt shoulder replacement surgery     GASTRIC BYPASS  1981, 2001    bypass 25 yrs ago, revised in 2001     HC REVISE MEDIAN N/CARPAL TUNNEL SURG  1999     INTERPOSITION TENDON HAND  6/28/2012    Procedure: INTERPOSITION TENDON HAND;  Right Arm  Radial  Nerve Tendon Transfers, Pronator Terres  to Extensor Carpi Radialis Brevis, Palmaris Longus to Extensor Pollicis Longus. Flexor Carpi Ulnaris to Extensor Digitorum Communis  ;  Surgeon: Laron Stevenson MD;  Location: [facility]     ORTHOPEDIC SURGERY  2011    shoulder- replacement     RELEASE TRIGGER FINGER  4/23/2013    Procedure: RELEASE TRIGGER FINGER;  Left Index, Middle and Ring Trigger Finger Releases.;  Surgeon: Laron Stevenson MD;  Location: [facility]     ROTATOR CUFF REPAIR RT/LT  2004    rt side     TONSILLECTOMY       Family Hx:  Family History   Problem Relation Age of Onset     CANCER Mother      lymphoma     CANCER Brother      esophageal ca     Thyroid disease: No         DM2: No         Autoimmune: DM1, SLE, RA, Vitiligo: No    Social Hx:  Social History     Social History     Marital status:      Spouse name: N/A     Number of children: N/A     Years of education: N/A     Occupational History     Not on file.     Social History Main Topics     Smoking status: Never Smoker     Smokeless tobacco: Never Used     Alcohol use Yes      Comment: rarely     Drug use: No     Sexual activity: No     Other Topics Concern     Parent/Sibling W/ Cabg, Mi Or Angioplasty Before 65f 55m? No     Social History Narrative          MEDICATIONS:  has a current medication list which includes the following prescription(s): oxycodone, omeprazole, cyanocobalamin, hydrocortisone, cholecalciferol, meloxicam, diclofenac, cyclobenzaprine, lisinopril, order for dme, order for dme, vitamin d3, and one-a-day within.    ROS     ROS: 10 point ROS neg other than the symptoms noted above in the HPI.    Physical Exam   VS: /80 (BP Location: Right arm, Patient Position: Chair, Cuff Size: Adult Regular)  Pulse 98  Temp 97.8  F (36.6  C) (Oral)  Resp 12  Wt 64.9 kg (143 lb)  BMI 30.41 kg/m2  GENERAL: AXOX3, NAD, well dressed, answering questions appropriately, appears stated age.  HEENT: OP clear, no LAD, no TM,  non-tender, no exopthalmous, no proptosis, EOMI, no lig lag, no retraction  CV: RRR, no rubs, gallops, no murmurs  LUNGS: CTAB, no wheezes, rales, or ronchi  ABDOMEN: soft, nontender, nondistended  EXTREMITIES: no edema, +pulses, no rashes, no lesions  NEUROLOGY: CN grossly intact, no tremors  MSK: grossly intact  SKIN: no rashes, no lesions    LABS:  BMP:  !COMPREHENSIVE Latest Ref Rng & Units 2016   SODIUM 133 - 144 mmol/L 137 141   POTASSIUM 3.4 - 5.3 mmol/L 3.7 3.8   CHLORIDE 94 - 109 mmol/L 103 107   BUN 7 - 30 mg/dL 11 12   Creatinine 0.52 - 1.04 mg/dL 0.58 0.56   Glucose 70 - 99 mg/dL 95 88   ANION GAP 3 - 14 mmol/L 9 9   CALCIUM 8.5 - 10.1 mg/dL 9.2 9.1   ALBUMIN 3.4 - 5.0 g/dL 4.0 3.9     PTH:  Component      Latest Ref Rng & Units 2009   Parathyroid Hormone Intact      12 - 72 pg/mL 64 73 (H) 86 (H)     Vitamin D:  Component      Latest Ref Rng & Units 2013 4/15/2013 2013 1/10/2014   Vitamin D Deficiency screening      20 - 75 ug/L 14 (L) 32 20 (L) 19 (L)     Component      Latest Ref Rng & Units 2015   Vitamin D Deficiency screening      20 - 75 ug/L 17 (L) <13 (L) . . . <13 (L) . . .     TFTs:  !THYROID Latest Ref Rng & Units 2014   TSH 0.40 - 4.00 mU/L 3.00     BONE DENSITOMETRY  2012    PATIENT:  Keiko Kimball  :  1950  AGE:  61 year old    INDICATIONS:  Post-menopausal, Follow-up osteoporosis, Height  loss of 4.0 inches, Fracture of elbow, humerus at age 61.    CURRENT TREATMENT:  Calcium with Vitamin D    FINDINGS:              Lumbar Spine L1-L4:  T-score -0.2; but with  significant scoliosis and degenerative changes making this  unreliable              Left Femoral Neck:  T-score -3.2                Right Femoral Neck:  T-score n/a               33 % distal radius: T-score -2.8    Comparison is performed to previous DEXA performed on a [employer]  on 08/10/2010.      All pertinent notes, labs, and images  personally reviewed by me.     A/P  Ms.Linda CHAGO Kimball is a 66 year old here for the evaluation of:    1. Hyperparathyroidism:  Most likely secondary due to vitamin D deficiency and malabsorption due to gastric bypass.  Unable to tolerate prescription D.  Recommend increasing D to 2000 IU up to 4x/day.  Plan to recheck labs in 3 months - PTH should improve or decrease with normal D if she can tolerate a dose of D sufficient to normalize her levels.s    In the differential diagnosis of hypercalcemia, primary hyperparathyroidism is the most common cause. It is important to distinguish readily between primary hyperparathyroidism and other causes of hypercalcemia.     Exceptions to the rule that patients with hypercalcemia and elevated PTH levels have primary hyperPTH include two medications, lithium and thiazide diuretics. Actually, many of these patients do have primary hyperparathyroidism but the only way to be sure is to withdraw the medication and to monitor the serum calcium over the next 3-6 months.    HEREDITARY HYPERPARATHYROID STATES  Multiple Endocrine Neoplasia (MEN), both type 1 and type II.  Primary hyperparathyroidism is often the first and is the most common of the endocrinopathies in MEN1, reaching nearly 100% penetrance by the age of 50. In MEN I, the other tumors, besides the parathyroids, that can develop are those of pancreas and anterior pituitary glands. Involvement of two of the three glands confirms the presence of MEN I. MEN IIa, in which hyperparathyroidism can be associated with medullary thyroid cancer.    Hyperparathyroidism jaw tumor syndrome (AD), is associated with PHPT and fibromas in the mandible or the maxilla. Unlike FHH or the MEN I and II, parathyroid carcinoma is more common in hyperparathyroidism jaw tumor syndrome.     Familial hypocalciuric hypercalcemia (FHH). This presentation that can be confused with the most common form of primary hyperparathyroidism, namely the sporadic  isolated disorder. FHH is also known as Familial Benign Hypercalcemia because it is generally asymptomatic and does not require treatment.  Lab work to differentiate FHH from primary PTH Hypercalcemia, Hypocalciuria, Normal to high levels of PTH, Hypermagnesemia, Calcium/creatinine clearance ratio <0.01, and Genetic testing for mutations in CASR.     The serum calcium determination is typically not greater than 1 mg/dl above the upper limits of normal. The serum phosphorus is in the lower range of normal with only approximately 25% of patients showing phosphorus levels that are frankly low. Total alkaline phosphatase activity is in the high normal range as is the case also for more specific markers of bone turnover and, bone-specific alkaline phosphatase activity. If the normal concentration of 25-hydroxyvitamin D level is taken to be >30 ng/ml, then most patients with primary hyperparathyroidism will have low levels. In contrast, the 1,25-dihydroxyvitamin D level tends to be in the upper range of normal and, in fact, frankly elevated in 25% of patients with primary hyperparathyroidism.    Guidelines for parathyroid surgery in asymptomatic primary hyperparathyroidism:    Serum Calcium >1.0 mg/dl (0.25 mmol/L) above normal   Creatinine Clearance (calculated) Below 60m1/min /1.73 m2)   Bone Mineral Density T score < -2.5 SD at spine, hip (total or femoral neck) or radius (distal 1/3 site) or presence of fragility fracture   Age Age < 50 years        PARATHYROID GLAND IMAGING  Pre- operative imaging is of value prior to surgery in the localization of abnormal parathyroid tissue . The diagnosis of PHPT is based on the biochemical findings and is not affected by the results of the imaging studies. Sestamibi imaging is of value in localizing abnormal parathyroid tissue. The sensitivity and specificity varies among institutions.    2.  Osteoporosis-severe.  -recommend repeat BMD - she does not want to repeat the BMD at  this time, says it is a waste because it will be the same or worse.  -recommend treatment with Prolia but she is fearful of side effects.  -not a forteo candidate unless PTH is normal - unlikely she would agree to this therapy anyway but I would/will offer it if PTH becomes normal.  -not a bisphosphonate candidate for multiple reasons.  -recommend adequate calcium intake, 1375-7338 mg/day.    Labs ordered today:   Orders Placed This Encounter   Procedures     TSH     T4 FREE     Tissue transglutaminase joshua IgA and IgG     Renal panel (Alb, BUN, Ca, Cl, CO2, Creat, Gluc, Phos, K, Na)     Parathyroid Hormone Intact     Vitamin D Deficiency     Radiology/Consults ordered today: None    More than 50% of the time spent with Ms. Kimball on counseling / coordinating her care.  Total face to face time was greater than or equal to 45 minutes.      Follow-up:  3 months-labs prior (future orders placed)    Keiko Long NP  Endocrinology  [facility]  CC: Brittnee Sharma

## 2017-04-17 NOTE — NURSING NOTE
"Chief Complaint   Patient presents with     Consult     parathyroid level       Initial /80 (BP Location: Right arm, Patient Position: Chair, Cuff Size: Adult Regular)  Pulse 98  Temp 97.8  F (36.6  C) (Oral)  Resp 12  Wt 143 lb (64.9 kg)  BMI 30.41 kg/m2 Estimated body mass index is 30.41 kg/(m^2) as calculated from the following:    Height as of 1/9/17: 4' 9.5\" (1.461 m).    Weight as of this encounter: 143 lb (64.9 kg).  Medication Reconciliation: complete    "

## 2017-04-18 PROBLEM — E21.3 HYPERPARATHYROIDISM (H): Status: ACTIVE | Noted: 2017-04-18

## 2017-04-24 ENCOUNTER — TELEPHONE (OUTPATIENT)
Dept: FAMILY MEDICINE | Facility: CLINIC | Age: 67
End: 2017-04-24

## 2017-04-24 NOTE — TELEPHONE ENCOUNTER
Pt calling, complaining insurance won't fill her Omeprazole,   Contacted pharmacy, they stated the dose exceeds the normal limit, will submit request for PA  Informed pt to take 1 tab qd until we get PA approved    Donya Shanks RN, BS  Clinical Nurse Triage.

## 2017-04-25 NOTE — TELEPHONE ENCOUNTER
Insurance faxed us a form to fill out.  Faxed to insurance.    # [phone]  ID#[id number]    OSWALDO Li  April 25, 2017  2:45 PM

## 2017-05-05 ENCOUNTER — ALLIED HEALTH/NURSE VISIT (OUTPATIENT)
Dept: NURSING | Facility: CLINIC | Age: 67
End: 2017-05-05
Payer: COMMERCIAL

## 2017-05-05 DIAGNOSIS — E55.9 VITAMIN D DEFICIENCY: Primary | ICD-10-CM

## 2017-05-05 PROCEDURE — 96372 THER/PROPH/DIAG INJ SC/IM: CPT

## 2017-05-05 PROCEDURE — 99207 ZZC NO CHARGE NURSE ONLY: CPT

## 2017-05-22 PROBLEM — M79.601 PAIN OF RIGHT UPPER EXTREMITY: Status: RESOLVED | Noted: 2017-03-07 | Resolved: 2017-05-22

## 2017-06-01 ENCOUNTER — ALLIED HEALTH/NURSE VISIT (OUTPATIENT)
Dept: NURSING | Facility: CLINIC | Age: 67
End: 2017-06-01
Payer: COMMERCIAL

## 2017-06-01 DIAGNOSIS — E53.8 VITAMIN B12 DEFICIENCY (NON ANEMIC): Primary | ICD-10-CM

## 2017-06-01 PROCEDURE — 96372 THER/PROPH/DIAG INJ SC/IM: CPT

## 2017-06-01 PROCEDURE — 99207 ZZC NO CHARGE NURSE ONLY: CPT

## 2017-06-29 NOTE — PROGRESS NOTES
HPI   SUBJECTIVE:                                                    Keiko Kimball is a 66 year old female who presents to clinic today for the following health issues:  Chronic Pain Follow-Up       Type / Location of Pain: All over   Analgesia/pain control:       Recent changes:  same      Overall control: Tolerable with discomfort  Activity level/function:      Daily activities:  Able to do all daily activities and walking 14 blocks 1x a week    Work:  Unable to work  Adverse effects:  No  Adherance    Taking medication as directed?  Yes    Participating in other treatments: no  Risk Factors:    Sleep:  Poor- only sleeps for a couple hours     Mood/anxiety:  Controlled-about the same    Recent family or social stressors:  none noted    Other aggravating factors: poor posture and repetitive activities - chores around the house and cooking  PHQ-9 SCORE 9/10/2015 10/5/2015   Total Score 0 8     NEETA-7 SCORE 9/10/2015 10/5/2015 10/24/2016   Total Score 0 0 0     Encounter-Level CSA - 07/10/2015:                 Controlled Substance Agreement - Scan on 7/15/2015  3:09 PM : [facility] Controlled Substance Agreement 7-10-15 (below)               Keiko is currently taking two oxycodone a day to help her stay mobile.      Amount of exercise or physical activity: 2-3 days/week for an average of 15-30 minutes. Walks 14 blocks 1 day a week. Takes garbage out and up and down stairs when doing laundry    Problems taking medications regularly: No    Medication side effects: none    Diet: regular (no restrictions)      PROBLEMS TO ADD ON...  Keiko would also like a letter written for a  cat. Her current apartment allows cats, but she is planning on moving and would like to make sure that she would be allowed to bring her cat.     Vitamin D: notes that she tried to take 8 tablets of vitamin D. She is taking 4000 units of vitamin D currently. Has also been sitting out in the sun.   B12: will be getting injection  today.   Stomach: did go to gall bladder doctor, but has not followed up with GI. Reports symptoms are okay. She is finding ways to keep foods down. Also finding which foods bother. Weight is now stable, is not loosing weight  She has labs ordered by ehsan Aleman, but pt not wanting to get all that done, feels she does not have a wheat allergy  Hypertension: Refills needed of lisinopril 10 mg.   BP Readings from Last 3 Encounters:   07/03/17 116/70   04/17/17 120/80   04/06/17 130/80         Problem list and histories reviewed & adjusted, as indicated.  Additional history: as documented    BP Readings from Last 3 Encounters:   07/03/17 116/70   04/17/17 120/80   04/06/17 130/80    Wt Readings from Last 3 Encounters:   07/03/17 63.5 kg (140 lb)   04/17/17 64.9 kg (143 lb)   04/06/17 63.6 kg (140 lb 3.2 oz)               Labs reviewed in EPIC    Reviewed and updated as needed this visit by clinical staff  Tobacco  Allergies  Problems  Med Hx  Soc Hx      Reviewed and updated as needed this visit by Provider         ROS:  Constitutional, HEENT, cardiovascular, pulmonary, gi and gu systems are negative, except as otherwise noted.    This document serves as a record of the services and decisions personally performed and made by Brittnee Sharma MD. It was created on her behalf by Luly Wan, a trained medical scribe. The creation of this document is based the provider's statements to the medical scribe.  Luly Wan July 3, 2017 10:30 AM    OBJECTIVE:     /70 (BP Location: Right arm, Cuff Size: Adult Regular)  Pulse 68  Temp 97.5  F (36.4  C) (Oral)  Resp 14  Wt 63.5 kg (140 lb)  BMI 29.77 kg/m2  Body mass index is 29.77 kg/(m^2).  GENERAL: healthy, alert and no distress  EYES: Eyes grossly normal to inspection, PERRL and conjunctivae and sclerae normal  HENT: ear canals and TM's normal, nose and mouth without ulcers or lesions  NECK: no adenopathy, no asymmetry, masses, or scars and thyroid  normal to palpation  RESP: lungs clear to auscultation - no rales, rhonchi or wheezes  CV: regular rate and rhythm, normal S1 S2, no S3 or S4, no murmur, click or rub, no peripheral edema and peripheral pulses strong  abd exam is normal, no pain, did while seated, no epigastric pain  MS: right shoulder dislocated, unable to lift right arm, has brace on it, no edema  SKIN: no suspicious lesions or rashes  NEURO: Normal strength and tone, mentation intact and speech normal  PSYCH: mentation appears normal, affect normal/bright    Diagnostic Test Results:  No results found for this or any previous visit (from the past 24 hour(s)).    ASSESSMENT/PLAN:   (I10) Essential hypertension, benign  (primary encounter diagnosis)  Comment: blood pressure well controlled. Continue with lisinopril 10 mg. Refilled.   Plan: lisinopril (PRINIVIL/ZESTRIL) 10 MG tablet        Follow up in six months.     (M19.90) Arthritis  Comment: Flexeril refilled.   Plan: cyclobenzaprine (FLEXERIL) 10 MG tablet        Follow up in six months.     (G89.4) Chronic pain syndrome  Comment: Pain controlled with oxycodone 5 mg BID.  Plan: oxyCODONE (OXY-IR) 5 MG capsule, DISCONTINUED:         oxyCODONE (OXY-IR) 5 MG capsule, DISCONTINUED:         oxyCODONE-acetaminophen (PERCOCET) 5-325 MG per        tablet, DISCONTINUED: oxyCODONE-acetaminophen         (PERCOCET) 5-325 MG per tablet, DISCONTINUED:         oxyCODONE (OXY-IR) 5 MG capsule        Follow up in three months.     (Z71.89) Advanced directives, counseling/discussion  Comment: Patient reports she has a living will. Asked patient to bring in copy to have placed in her chart.   Plan: Follow up as needed.     (E21.3) Hyperparathyroidism (H)  Comment: will recheck parathyroid levels today along with vitamin d. Patient currently taking 4000 units of vitamin d.   Plan: Parathyroid Hormone Intact, Vitamin D         Deficiency, Basic metabolic panel        Follow up per labs.         The information in  this document, created by the medical scribe for me, accurately reflects the services I personally performed and the decisions made by me. I have reviewed and approved this document for accuracy prior to leaving the patient care area.  Brittnee Sharma MD 10:30 AM 7/3/2017          Brittnee Sharma MD  [facility]      Physical Exam

## 2017-07-03 ENCOUNTER — OFFICE VISIT (OUTPATIENT)
Dept: FAMILY MEDICINE | Facility: CLINIC | Age: 67
End: 2017-07-03
Payer: COMMERCIAL

## 2017-07-03 VITALS
WEIGHT: 140 LBS | HEART RATE: 68 BPM | RESPIRATION RATE: 14 BRPM | BODY MASS INDEX: 29.77 KG/M2 | DIASTOLIC BLOOD PRESSURE: 70 MMHG | SYSTOLIC BLOOD PRESSURE: 116 MMHG | TEMPERATURE: 97.5 F

## 2017-07-03 DIAGNOSIS — Z71.89 ADVANCED DIRECTIVES, COUNSELING/DISCUSSION: ICD-10-CM

## 2017-07-03 DIAGNOSIS — E21.3 HYPERPARATHYROIDISM (H): ICD-10-CM

## 2017-07-03 DIAGNOSIS — I10 ESSENTIAL HYPERTENSION, BENIGN: Primary | ICD-10-CM

## 2017-07-03 DIAGNOSIS — M19.90 ARTHRITIS: ICD-10-CM

## 2017-07-03 DIAGNOSIS — G89.4 CHRONIC PAIN SYNDROME: ICD-10-CM

## 2017-07-03 LAB
ANION GAP SERPL CALCULATED.3IONS-SCNC: 11 MMOL/L (ref 3–14)
BUN SERPL-MCNC: 14 MG/DL (ref 7–30)
CALCIUM SERPL-MCNC: 9.4 MG/DL (ref 8.5–10.1)
CHLORIDE SERPL-SCNC: 103 MMOL/L (ref 94–109)
CO2 SERPL-SCNC: 24 MMOL/L (ref 20–32)
CREAT SERPL-MCNC: 0.6 MG/DL (ref 0.52–1.04)
GFR SERPL CREATININE-BSD FRML MDRD: NORMAL ML/MIN/1.7M2
GLUCOSE SERPL-MCNC: 85 MG/DL (ref 70–99)
POTASSIUM SERPL-SCNC: 3.9 MMOL/L (ref 3.4–5.3)
PTH-INTACT SERPL-MCNC: 48 PG/ML (ref 12–72)
SODIUM SERPL-SCNC: 138 MMOL/L (ref 133–144)

## 2017-07-03 PROCEDURE — 99214 OFFICE O/P EST MOD 30 MIN: CPT | Performed by: FAMILY MEDICINE

## 2017-07-03 PROCEDURE — 36415 COLL VENOUS BLD VENIPUNCTURE: CPT | Performed by: FAMILY MEDICINE

## 2017-07-03 PROCEDURE — 83970 ASSAY OF PARATHORMONE: CPT | Performed by: FAMILY MEDICINE

## 2017-07-03 PROCEDURE — 80048 BASIC METABOLIC PNL TOTAL CA: CPT | Performed by: FAMILY MEDICINE

## 2017-07-03 PROCEDURE — 82306 VITAMIN D 25 HYDROXY: CPT | Performed by: FAMILY MEDICINE

## 2017-07-03 RX ORDER — OXYCODONE HYDROCHLORIDE 5 MG/1
5 CAPSULE ORAL EVERY 4 HOURS PRN
Qty: 60 CAPSULE | Refills: 0 | Status: SHIPPED | OUTPATIENT
Start: 2017-09-03 | End: 2017-10-02

## 2017-07-03 RX ORDER — LISINOPRIL 10 MG/1
10 TABLET ORAL DAILY
Qty: 90 TABLET | Refills: 1 | Status: SHIPPED | OUTPATIENT
Start: 2017-07-03 | End: 2017-10-02

## 2017-07-03 RX ORDER — CYCLOBENZAPRINE HCL 10 MG
10 TABLET ORAL
Qty: 180 TABLET | Refills: 3 | Status: SHIPPED | OUTPATIENT
Start: 2017-07-03 | End: 2018-02-02

## 2017-07-03 RX ORDER — OXYCODONE AND ACETAMINOPHEN 5; 325 MG/1; MG/1
1 TABLET ORAL EVERY 4 HOURS PRN
Qty: 18 TABLET | Refills: 0 | Status: SHIPPED | OUTPATIENT
Start: 2017-09-03 | End: 2017-07-03

## 2017-07-03 RX ORDER — OXYCODONE HYDROCHLORIDE 5 MG/1
5 CAPSULE ORAL EVERY 4 HOURS PRN
Qty: 60 CAPSULE | Refills: 0 | Status: SHIPPED | OUTPATIENT
Start: 2017-08-03 | End: 2017-07-03

## 2017-07-03 RX ORDER — OXYCODONE AND ACETAMINOPHEN 5; 325 MG/1; MG/1
1 TABLET ORAL EVERY 4 HOURS PRN
Qty: 18 TABLET | Refills: 0 | Status: SHIPPED | OUTPATIENT
Start: 2017-08-03 | End: 2017-07-03

## 2017-07-03 RX ORDER — OXYCODONE HYDROCHLORIDE 5 MG/1
5 CAPSULE ORAL EVERY 4 HOURS PRN
Qty: 60 CAPSULE | Refills: 0 | Status: SHIPPED | OUTPATIENT
Start: 2017-07-03 | End: 2017-07-03

## 2017-07-03 NOTE — MR AVS SNAPSHOT
"              After Visit Summary   7/3/2017    Keiko Kimball    MRN: [id number]           Patient Information     Date Of Birth          1950        Visit Information        Provider Department      7/3/2017 10:20 AM Brittnee Sharma MD [facility]        Today's Diagnoses     Essential hypertension, benign    -  1    Arthritis        Chronic pain syndrome        Advanced directives, counseling/discussion        Hyperparathyroidism (H)           Follow-ups after your visit        Follow-up notes from your care team     Return in about 3 months (around 10/3/2017).      Who to contact     If you have questions or need follow up information about today's clinic visit or your schedule please contact [facility] directly at [phone].  Normal or non-critical lab and imaging results will be communicated to you by MyChart, letter or phone within 4 business days after the clinic has received the results. If you do not hear from us within 7 days, please contact the clinic through [employer] or phone. If you have a critical or abnormal lab result, we will notify you by phone as soon as possible.  Submit refill requests through [facility] or call your pharmacy and they will forward the refill request to us. Please allow 3 business days for your refill to be completed.          Additional Information About Your Visit        MyChart Information     [facility] lets you send messages to your doctor, view your test results, renew your prescriptions, schedule appointments and more. To sign up, go to [email or web address] . Click on \"Log in\" on the left side of the screen, which will take you to the Welcome page. Then click on \"Sign up Now\" on the right side of the page.     You will be asked to enter the access code listed below, as well as some personal information. Please follow the directions to create your username and password.     Your access code is: [email or web address]  Expires: 8/30/2017  " 3:52 PM     Your access code will  in 90 days. If you need help or a new code, please call your [facility] or [phone].        Care EveryWhere ID     This is your Care EveryWhere ID. This could be used by other organizations to access your [place] medical records  [id number]        Your Vitals Were     Pulse Temperature Respirations BMI (Body Mass Index)          68 97.5  F (36.4  C) (Oral) 14 29.77 kg/m2         Blood Pressure from Last 3 Encounters:   17 116/70   17 120/80   17 130/80    Weight from Last 3 Encounters:   17 140 lb (63.5 kg)   17 143 lb (64.9 kg)   17 140 lb 3.2 oz (63.6 kg)              We Performed the Following     Basic metabolic panel     Parathyroid Hormone Intact     Vitamin D Deficiency          Today's Medication Changes          These changes are accurate as of: 7/3/17 11:12 AM.  If you have any questions, ask your nurse or doctor.               Start taking these medicines.        Dose/Directions    oxyCODONE 5 MG capsule   Commonly known as:  OXY-IR   Used for:  Chronic pain syndrome   Started by:  Brittnee Sharma MD        Dose:  5 mg   Start taking on:  9/3/2017   Take 1 capsule (5 mg) by mouth every 4 hours as needed for moderate to severe pain   Quantity:  60 capsule   Refills:  0            Where to get your medicines      These medications were sent to [facility] [address] [place] [address]  [id number] [place] [address]    Hours:  24-hours Phone:  [phone]     cyclobenzaprine 10 MG tablet    lisinopril 10 MG tablet         Some of these will need a paper prescription and others can be bought over the counter.  Ask your nurse if you have questions.     Bring a paper prescription for each of these medications     oxyCODONE 5 MG capsule                Primary Care Provider Office Phone # Fax #    Brittnee Sharma [phone]        [facility] [address]  [address]   [facility] [address]        Equal Access to Services     EDWINA PAIGE : Hadii sheyla ramirez hadlouieo Sostephaniali, waaxda luqadaha, qaybta kaalmada nomijuwancari, marita lunataitayler winn. So [facility] [phone].    ATENCIÓN: Si habla español, tiene a frank disposición servicios gratuitos de asistencia lingüística. Llame al [phone].    We comply with applicable federal civil rights laws and [place] laws. We do not discriminate on the basis of race, color, national origin, age, disability sex, sexual orientation or gender identity.            Thank you!     Thank you for choosing [facility]  for your care. Our goal is always to provide you with excellent care. Hearing back from our patients is one way we can continue to improve our services. Please take a few minutes to complete the written survey that you may receive in the mail after your visit with us. Thank you!             Your Updated Medication List - Protect others around you: Learn how to safely use, store and throw away your medicines at www.disposemymeds.org.          This list is accurate as of: 7/3/17 11:12 AM.  Always use your most recent med list.                   Brand Name Dispense Instructions for use Diagnosis    cholecalciferol 1000 UNIT tablet    vitamin D    120 tablet    Take 2 tablets (2,000 Units) by mouth 4 times daily    Vitamin D deficiency       cyanocobalamin 1000 MCG/ML injection    VITAMIN B12    1 mL    Inject 1 mL (1,000 mcg) into the muscle every 30 days    Vitamin B12 deficiency without anemia       cyclobenzaprine 10 MG tablet    FLEXERIL    180 tablet    Take 1 tablet (10 mg) by mouth nightly as needed Pt takes 10mg to 20mg as needed at bedtime    Arthritis       diclofenac 1 % Gel topical gel    VOLTAREN    100 g    Apply 2 g topically 4 times daily Apply 4 grams to knees or 2 grams to hands four times daily using enclosed dosing card.    Osteoarthritis of  multiple joints, unspecified osteoarthritis type       hydrocortisone 1 % cream    KP HYDROCORTISONE    15 g    Apply small amount to area as needed    Rash       lisinopril 10 MG tablet    PRINIVIL/ZESTRIL    90 tablet    Take 1 tablet (10 mg) by mouth daily    Essential hypertension, benign       meloxicam 7.5 MG tablet    MOBIC    30 tablet    Take 1 tablet (7.5 mg) by mouth every 36 hours Or as needed, not more then 2 times per week    Arthritis       omeprazole 40 MG capsule    priLOSEC    180 capsule    Take 1 capsule (40 mg) by mouth 2 times daily Take 30-60 minutes before a meal.    Peptic ulcer       ONE-A-DAY WITHIN Tabs      Take  by mouth 2 times daily.        order for DME     1 each    One lift-chair for mobilization to use daily.    Osteoporosis, Arm fracture, right, Elbow fracture, right, Radial nerve palsy       order for DME     2 each    Equipment being ordered:  [employer] Walking Lace Shoe~     Pain in limb, Chronic pain syndrome, Osteoporosis, unspecified       oxyCODONE 5 MG capsule   Start taking on:  9/3/2017    OXY-IR    60 capsule    Take 1 capsule (5 mg) by mouth every 4 hours as needed for moderate to severe pain    Chronic pain syndrome

## 2017-07-03 NOTE — NURSING NOTE
"Chief Complaint   Patient presents with     Recheck Medication     Imm/Inj     b-12 INJECTION       Initial /70 (BP Location: Right arm, Cuff Size: Adult Regular)  Pulse 68  Temp 97.5  F (36.4  C) (Oral)  Resp 14  Wt 140 lb (63.5 kg)  BMI 29.77 kg/m2 Estimated body mass index is 29.77 kg/(m^2) as calculated from the following:    Height as of 1/9/17: 4' 9.5\" (1.461 m).    Weight as of this encounter: 140 lb (63.5 kg).  Medication Reconciliation: complete   Valeria Monroy MA    "

## 2017-07-03 NOTE — LETTER
[address], [address]  [facility] [address]  Phone: [phone]  Fax: [phone]    July 3, 2017        Keiko Kimball  [address] [place] [address]          To whom it may concern:    RE: Keiko Kimball    Patient was seen and treated today at our clinic.Due to her medical conditions, she will need to keep with her a  cat.    Please contact me for questions or concerns.      Sincerely,        Brittnee Sharma MD

## 2017-07-03 NOTE — LETTER
[address]  July 7, 2017       [place] [address]           Phone :  [phone]          Fax:  [phone]  Keiko Kimball  [address] [facility] [address] [address]    Dear Keiko,    All your labs are NORMAL.    Results for orders placed or performed in visit on 07/03/17   Parathyroid Hormone Intact   Result Value Ref Range    Parathyroid Hormone Intact 48 12 - 72 pg/mL   Vitamin D Deficiency   Result Value Ref Range    Vitamin D Deficiency screening 35 20 - 75 ug/L   Basic metabolic panel   Result Value Ref Range    Sodium 138 133 - 144 mmol/L    Potassium 3.9 3.4 - 5.3 mmol/L    Chloride 103 94 - 109 mmol/L    Carbon Dioxide 24 20 - 32 mmol/L    Anion Gap 11 3 - 14 mmol/L    Glucose 85 70 - 99 mg/dL    Urea Nitrogen 14 7 - 30 mg/dL    Creatinine 0.60 0.52 - 1.04 mg/dL    GFR Estimate >90  Non  GFR Calc   >60 mL/min/1.7m2    GFR Estimate If Black >90   GFR Calc   >60 mL/min/1.7m2    Calcium 9.4 8.5 - 10.1 mg/dL     Sincerely,        Brittnee Sharma MD

## 2017-07-05 ENCOUNTER — TELEPHONE (OUTPATIENT)
Dept: FAMILY MEDICINE | Facility: CLINIC | Age: 67
End: 2017-07-05

## 2017-07-05 LAB — DEPRECATED CALCIDIOL+CALCIFEROL SERPL-MC: 35 UG/L (ref 20–75)

## 2017-07-05 NOTE — TELEPHONE ENCOUNTER
Pt calls, reports she had call from her insurance [employer] informed PA not approved because of Diagnosis knee pain.    However, dx on Rx:   Rx  Associated Diagnoses   Arthritis [M19.90]         Pt states Rx should have diagnoses:   Fibromyalgia and low back disk disease.    OK to add these dx to [facility]?   Francisco Chandra RN

## 2017-07-05 NOTE — TELEPHONE ENCOUNTER
Insurance Requires Prior Authorization    Provider:  Brittnee Sharma  Phone #: [phone]  ID#: [id number]  CRESPO#:[id number]  Medication/SIG: cyclobenzaprine (FLEXERIL) 10 MG tablet  Sig: Take 1 tablet (10 mg) by mouth nightly as needed Pt takes 10mg to 20mg as needed at bedtime    Pharmacy: [facility]    Prior auth submitted though Cover My Meds.    OSWALDO Li  July 5, 2017  11:03 AM

## 2017-08-02 ENCOUNTER — ALLIED HEALTH/NURSE VISIT (OUTPATIENT)
Dept: NURSING | Facility: CLINIC | Age: 67
End: 2017-08-02
Payer: COMMERCIAL

## 2017-08-02 DIAGNOSIS — E53.8 VITAMIN B 12 DEFICIENCY: Primary | ICD-10-CM

## 2017-08-02 PROCEDURE — 99207 ZZC NO CHARGE NURSE ONLY: CPT

## 2017-08-02 PROCEDURE — 96372 THER/PROPH/DIAG INJ SC/IM: CPT

## 2017-09-07 ENCOUNTER — ALLIED HEALTH/NURSE VISIT (OUTPATIENT)
Dept: NURSING | Facility: CLINIC | Age: 67
End: 2017-09-07
Payer: COMMERCIAL

## 2017-09-07 DIAGNOSIS — E53.8 VITAMIN B12 DEFICIENCY WITHOUT ANEMIA: Primary | ICD-10-CM

## 2017-09-07 PROCEDURE — 99207 ZZC NO CHARGE NURSE ONLY: CPT

## 2017-09-07 PROCEDURE — 96372 THER/PROPH/DIAG INJ SC/IM: CPT

## 2017-10-02 ENCOUNTER — OFFICE VISIT (OUTPATIENT)
Dept: FAMILY MEDICINE | Facility: CLINIC | Age: 67
End: 2017-10-02
Payer: COMMERCIAL

## 2017-10-02 VITALS
BODY MASS INDEX: 29.75 KG/M2 | TEMPERATURE: 98.3 F | OXYGEN SATURATION: 99 % | WEIGHT: 139.9 LBS | SYSTOLIC BLOOD PRESSURE: 112 MMHG | RESPIRATION RATE: 16 BRPM | DIASTOLIC BLOOD PRESSURE: 70 MMHG | HEART RATE: 74 BPM

## 2017-10-02 DIAGNOSIS — Z23 FLU VACCINE NEED: ICD-10-CM

## 2017-10-02 DIAGNOSIS — G89.4 CHRONIC PAIN SYNDROME: Primary | ICD-10-CM

## 2017-10-02 DIAGNOSIS — Z12.11 SPECIAL SCREENING FOR MALIGNANT NEOPLASMS, COLON: ICD-10-CM

## 2017-10-02 DIAGNOSIS — H35.30 MACULAR DEGENERATION (SENILE) OF RETINA: ICD-10-CM

## 2017-10-02 DIAGNOSIS — I10 ESSENTIAL HYPERTENSION, BENIGN: ICD-10-CM

## 2017-10-02 DIAGNOSIS — E53.8 VITAMIN B12 DEFICIENCY (NON ANEMIC): ICD-10-CM

## 2017-10-02 PROCEDURE — 96372 THER/PROPH/DIAG INJ SC/IM: CPT | Performed by: FAMILY MEDICINE

## 2017-10-02 PROCEDURE — 90662 IIV NO PRSV INCREASED AG IM: CPT | Performed by: FAMILY MEDICINE

## 2017-10-02 PROCEDURE — 99214 OFFICE O/P EST MOD 30 MIN: CPT | Mod: 25 | Performed by: FAMILY MEDICINE

## 2017-10-02 PROCEDURE — G0008 ADMIN INFLUENZA VIRUS VAC: HCPCS | Performed by: FAMILY MEDICINE

## 2017-10-02 RX ORDER — OXYCODONE HYDROCHLORIDE 5 MG/1
5 TABLET ORAL EVERY 4 HOURS PRN
Qty: 60 TABLET | Refills: 0 | Status: SHIPPED | OUTPATIENT
Start: 2017-11-02 | End: 2018-01-04

## 2017-10-02 RX ORDER — CYANOCOBALAMIN 1000 UG/ML
1 INJECTION, SOLUTION INTRAMUSCULAR; SUBCUTANEOUS
Qty: 1 ML | Refills: 11 | OUTPATIENT
Start: 2017-10-02 | End: 2018-11-09

## 2017-10-02 RX ORDER — OXYCODONE HYDROCHLORIDE 5 MG/1
5 CAPSULE ORAL EVERY 4 HOURS PRN
Qty: 60 CAPSULE | Refills: 0 | Status: SHIPPED | OUTPATIENT
Start: 2017-10-02 | End: 2017-12-07

## 2017-10-02 RX ORDER — LISINOPRIL 10 MG/1
10 TABLET ORAL DAILY
Qty: 90 TABLET | Refills: 1 | Status: SHIPPED | OUTPATIENT
Start: 2017-10-02 | End: 2018-01-02

## 2017-10-02 RX ORDER — OXYCODONE HYDROCHLORIDE 5 MG/1
5 TABLET ORAL EVERY 4 HOURS PRN
Qty: 60 TABLET | Refills: 0 | Status: SHIPPED | OUTPATIENT
Start: 2017-12-02 | End: 2017-12-07

## 2017-10-02 ASSESSMENT — ANXIETY QUESTIONNAIRES
5. BEING SO RESTLESS THAT IT IS HARD TO SIT STILL: NOT AT ALL
7. FEELING AFRAID AS IF SOMETHING AWFUL MIGHT HAPPEN: NOT AT ALL
1. FEELING NERVOUS, ANXIOUS, OR ON EDGE: NOT AT ALL
6. BECOMING EASILY ANNOYED OR IRRITABLE: MORE THAN HALF THE DAYS
GAD7 TOTAL SCORE: 4
2. NOT BEING ABLE TO STOP OR CONTROL WORRYING: NOT AT ALL
3. WORRYING TOO MUCH ABOUT DIFFERENT THINGS: NOT AT ALL

## 2017-10-02 ASSESSMENT — PAIN SCALES - GENERAL: PAINLEVEL: EXTREME PAIN (8)

## 2017-10-02 ASSESSMENT — PATIENT HEALTH QUESTIONNAIRE - PHQ9
5. POOR APPETITE OR OVEREATING: MORE THAN HALF THE DAYS
SUM OF ALL RESPONSES TO PHQ QUESTIONS 1-9: 0

## 2017-10-02 NOTE — NURSING NOTE
"Chief Complaint   Patient presents with     Refill Request     Flu Shot     Imm/Inj     B-12     Pain     CHRONIC      Initial /70 (BP Location: Left arm, Patient Position: Chair, Cuff Size: Adult Regular)  Pulse 74  Temp 98.3  F (36.8  C) (Oral)  Resp 16  Wt 139 lb 14.4 oz (63.5 kg)  SpO2 99%  BMI 29.75 kg/m2 Estimated body mass index is 29.75 kg/(m^2) as calculated from the following:    Height as of 1/9/17: 4' 9.5\" (1.461 m).    Weight as of this encounter: 139 lb 14.4 oz (63.5 kg).  BP completed using cuff size regular LEFT arm.    Lisa Magill, CMA    "

## 2017-10-02 NOTE — MR AVS SNAPSHOT
"              After Visit Summary   10/2/2017    Keiko Kimball    MRN: [id number]           Patient Information     Date Of Birth          1950        Visit Information        Provider Department      10/2/2017 10:20 AM Brittnee Sharma MD [facility]        Today's Diagnoses     Chronic pain syndrome    -  1    Essential hypertension, benign        Flu vaccine need        Macular degeneration (senile) of retina        Vitamin B12 deficiency (non anemic)        Special screening for malignant neoplasms, colon           Follow-ups after your visit        Follow-up notes from your care team     Return in about 3 months (around 1/2/2018).      Future tests that were ordered for you today     Open Future Orders        Priority Expected Expires Ordered    Fecal colorectal cancer screen (FIT) Routine 10/23/2017 12/25/2017 10/2/2017            Who to contact     If you have questions or need follow up information about today's clinic visit or your schedule please contact [facility] directly at [phone].  Normal or non-critical lab and imaging results will be communicated to you by MyChart, letter or phone within 4 business days after the clinic has received the results. If you do not hear from us within 7 days, please contact the clinic through [employer] or phone. If you have a critical or abnormal lab result, we will notify you by phone as soon as possible.  Submit refill requests through [employer] or call your pharmacy and they will forward the refill request to us. Please allow 3 business days for your refill to be completed.          Additional Information About Your Visit        MyChart Information     [employer] lets you send messages to your doctor, view your test results, renew your prescriptions, schedule appointments and more. To sign up, go to [email or web address] . Click on \"Log in\" on the left side of the screen, which will take you to the Welcome page. Then click " "on \"Sign up Now\" on the right side of the page.     You will be asked to enter the access code listed below, as well as some personal information. Please follow the directions to create your username and password.     Your access code is: [email or web address]  Expires: 2017  2:47 PM     Your access code will  in 90 days. If you need help or a new code, please call your [facility] clinic or [phone].        Care EveryWhere ID     This is your Care EveryWhere ID. This could be used by other organizations to access your [facility] medical records  [id number]        Your Vitals Were     Pulse Temperature Respirations Pulse Oximetry BMI (Body Mass Index)       74 98.3  F (36.8  C) (Oral) 16 99% 29.75 kg/m2        Blood Pressure from Last 3 Encounters:   10/02/17 112/70   17 116/70   17 120/80    Weight from Last 3 Encounters:   10/02/17 139 lb 14.4 oz (63.5 kg)   17 140 lb (63.5 kg)   17 143 lb (64.9 kg)              We Performed the Following     FLU VACCINE, INCREASED ANTIGEN, PRESV FREE          Today's Medication Changes          These changes are accurate as of: 10/2/17 10:45 AM.  If you have any questions, ask your nurse or doctor.               These medicines have changed or have updated prescriptions.        Dose/Directions    * oxyCODONE 5 MG capsule   Commonly known as:  OXY-IR   This may have changed:  Another medication with the same name was added. Make sure you understand how and when to take each.   Used for:  Chronic pain syndrome   Changed by:  Brittnee Sharma MD        Dose:  5 mg   Take 1 capsule (5 mg) by mouth every 4 hours as needed for moderate to severe pain   Quantity:  60 capsule   Refills:  0       * oxyCODONE 5 MG IR tablet   Commonly known as:  ROXICODONE   This may have changed:  You were already taking a medication with the same name, and this prescription was added. Make sure you understand how and when to take each.   Used for:  Chronic pain syndrome "   Changed by:  Brittnee Sharma MD        Dose:  5 mg   Start taking on:  11/2/2017   Take 1 tablet (5 mg) by mouth every 4 hours as needed for pain maximum 2 tablet(s) per day   Quantity:  60 tablet   Refills:  0       * oxyCODONE 5 MG IR tablet   Commonly known as:  ROXICODONE   This may have changed:  You were already taking a medication with the same name, and this prescription was added. Make sure you understand how and when to take each.   Used for:  Chronic pain syndrome   Changed by:  Brittnee Sharma MD        Dose:  5 mg   Start taking on:  12/2/2017   Take 1 tablet (5 mg) by mouth every 4 hours as needed for pain maximum 2 tablet(s) per day   Quantity:  60 tablet   Refills:  0       * Notice:  This list has 3 medication(s) that are the same as other medications prescribed for you. Read the directions carefully, and ask your doctor or other care provider to review them with you.         Where to get your medicines      These medications were sent to [facility] [address] [place] [address]    Hours:  24-hours Phone:  [phone]     lisinopril 10 MG tablet         Some of these will need a paper prescription and others can be bought over the counter.  Ask your nurse if you have questions.     Bring a paper prescription for each of these medications     oxyCODONE 5 MG capsule    oxyCODONE 5 MG IR tablet    oxyCODONE 5 MG IR tablet                Primary Care Provider Office Phone # Fax #    Brittnee Sharma [phone]       [address]  [address]   [facility] [address]        Equal Access to Services     [facility]: Hadii aad ashley hadasho Soomaali, waaxda luqadaha, qaybta kaalmada adeegyada, marita winn. So [facility] [phone].    ATENCIÓN: Si habla español, tiene a frank disposición servicios gratuitos de asistencia lingüística. Llame al  [phone].    We comply with applicable federal civil rights laws and [place] laws. We do not discriminate on the basis of race, color, national origin, age, disability, sex, sexual orientation, or gender identity.            Thank you!     Thank you for choosing [facility]  for your care. Our goal is always to provide you with excellent care. Hearing back from our patients is one way we can continue to improve our services. Please take a few minutes to complete the written survey that you may receive in the mail after your visit with us. Thank you!             Your Updated Medication List - Protect others around you: Learn how to safely use, store and throw away your medicines at www.disposemymeds.org.          This list is accurate as of: 10/2/17 10:45 AM.  Always use your most recent med list.                   Brand Name Dispense Instructions for use Diagnosis    cholecalciferol 1000 UNIT tablet    vitamin D    120 tablet    Take 2 tablets (2,000 Units) by mouth 4 times daily    Vitamin D deficiency       cyanocobalamin 1000 MCG/ML injection    VITAMIN B12    1 mL    Inject 1 mL (1,000 mcg) into the muscle every 30 days    Vitamin B12 deficiency without anemia       cyclobenzaprine 10 MG tablet    FLEXERIL    180 tablet    Take 1 tablet (10 mg) by mouth nightly as needed Pt takes 10mg to 20mg as needed at bedtime    Arthritis       diclofenac 1 % Gel topical gel    VOLTAREN    100 g    Apply 2 g topically 4 times daily Apply 4 grams to knees or 2 grams to hands four times daily using enclosed dosing card.    Osteoarthritis of multiple joints, unspecified osteoarthritis type       hydrocortisone 1 % cream    KP HYDROCORTISONE    15 g    Apply small amount to area as needed    Rash       lisinopril 10 MG tablet    PRINIVIL/ZESTRIL    90 tablet    Take 1 tablet (10 mg) by mouth daily    Essential hypertension, benign       meloxicam 7.5 MG tablet    MOBIC    30 tablet    Take 1 tablet (7.5 mg)  by mouth every 36 hours Or as needed, not more then 2 times per week    Arthritis       omeprazole 40 MG capsule    priLOSEC    180 capsule    Take 1 capsule (40 mg) by mouth 2 times daily Take 30-60 minutes before a meal.    Peptic ulcer       ONE-A-DAY WITHIN Tabs      Take  by mouth 2 times daily.        order for DME     1 each    One lift-chair for mobilization to use daily.    Osteoporosis, Arm fracture, right, Elbow fracture, right, Radial nerve palsy       order for DME     2 each    Equipment being ordered:  [employer] [facility] [employer] Walking Lace Shoe~     Pain in limb, Chronic pain syndrome, Osteoporosis, unspecified       * oxyCODONE 5 MG capsule    OXY-IR    60 capsule    Take 1 capsule (5 mg) by mouth every 4 hours as needed for moderate to severe pain    Chronic pain syndrome       * oxyCODONE 5 MG IR tablet   Start taking on:  11/2/2017    ROXICODONE    60 tablet    Take 1 tablet (5 mg) by mouth every 4 hours as needed for pain maximum 2 tablet(s) per day    Chronic pain syndrome       * oxyCODONE 5 MG IR tablet   Start taking on:  12/2/2017    ROXICODONE    60 tablet    Take 1 tablet (5 mg) by mouth every 4 hours as needed for pain maximum 2 tablet(s) per day    Chronic pain syndrome       * Notice:  This list has 3 medication(s) that are the same as other medications prescribed for you. Read the directions carefully, and ask your doctor or other care provider to review them with you.

## 2017-10-02 NOTE — NURSING NOTE
Injectable Influenza Immunization Documentation Form    1. Has the patient received the information for the influenza vaccine? YES    2. Does the patient have any of the following contraindications?      Allergy to eggs? No    Allergic reaction to previous influenza vaccines? No    Paralyzed by Guillain-Patton syndrome? No    Currently have moderate or severe illness? No    Allergy to contact lens solution/thimerosol? No    Are you or have you been on an anti viral medication in the last 48 hours? No    3. The vaccine has been administered and the patient was instructed to wait 15 minutes before leaving the building in the event of an allergic reaction: YES    Vaccination given by Belen Root MA      The following medication was given:     MEDICATION: Vitamin B12  1000 mcg  ROUTE: IM  SITE: Deltoid - Left  DOSE: 1 ml  LOT #: [id number]  :  [employer]  EXPIRATION DATE:  09/2018  NDC#: 7199-7810-49  Belen Root MA

## 2017-10-02 NOTE — PROGRESS NOTES
HPI   SUBJECTIVE:   Keiko Kimball is a 67 year old female who presents to clinic today for the following health issues:    Chronic Pain Follow-Up       Type / Location of Pain: bilateral knees, feet, low back and left shoulder   Analgesia/pain control:       Recent changes:  same      Overall control: Comfortably manageable  Activity level/function:      Daily activities:  Able to do light housework, cooking    Work:  not applicable  Adverse effects:  No  Adherance    Taking medication as directed?  Yes    Participating in other treatments: none  Risk Factors:    Sleep:  Poor    Mood/anxiety:  slightly worsened    Recent family or social stressors:  none noted    Other aggravating factors: prolonged sitting and prolonged standing     PHQ-9 SCORE 9/10/2015 10/5/2015   Total Score 0 8     NEETA-7 SCORE 9/10/2015 10/5/2015 10/24/2016   Total Score 0 0 0     Encounter-Level CSA - 07/10/2015:          Controlled Substance Agreement - Scan on 7/15/2015  3:09 PM : [facility] Controlled Substance Agreement 7-10-15 (below)                Amount of exercise or physical activity: 2-3 days/week for an average of 15-30 minutes    Problems taking medications regularly: No    Medication side effects: none  Diet: regular (no restrictions)    She takes 1 tablet of Flexeril before bed and states that it is helping manage pain.     GERD  She is still taking omeprazole. Patient had an upper endoscopy performed which revealed a peptic ulcer. She takes extra strength tylenol for pain when needed. Still continues to vomit sporadically. Patient states that she has been able to keep her food down and weight stable lately.     Insomnia  Patient states that the insomnia is because of pain, not because of depression.     Macular degeneration  She has macular degeneration in the left eye and states that she now also has it in the right eye. Opthalmologist suggested she wear glasses and take a certain vitamin supplement.     Vitamin D  deficiency  Patient is not currently taking any vitamin D supplements. She was going outside in the sun for about 15 minutes in the summer. She states that she will start taking supplements again for the winter.     PROBLEMS TO ADD ON...  Patient states that she stays in her apartment all day. 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 Patient expressed frustration with this.     Problem list and histories reviewed & adjusted, as indicated.  Additional history: as documented    BP Readings from Last 3 Encounters:   10/02/17 112/70   07/03/17 116/70   04/17/17 120/80    Wt Readings from Last 3 Encounters:   10/02/17 63.5 kg (139 lb 14.4 oz)   07/03/17 63.5 kg (140 lb)   04/17/17 64.9 kg (143 lb)        Labs reviewed in EPIC    Reviewed and updated as needed this visit by clinical staffTobacco  Allergies  Meds  Problems  Med Hx  Surg Hx  Fam Hx  Soc Hx        Reviewed and updated as needed this visit by Provider         ROS:  Constitutional, HEENT, cardiovascular, pulmonary, gi and gu systems are negative, except as otherwise noted.    HEENT: POSITIVE for macular degeneration bilaterally  GI: POSITIVE for GERD and peptic ulcer, and sporadic vomiting  Psych: POSITIVE for insomnia    This document serves as a record of the services and decisions personally performed and made by Brittnee Sharma MD. It was created on her behalf by Patrica Rice, a trained medical scribe. The creation of this document is based on the provider's statements to the medical scribe.  Patrica Rice October 2, 2017 10:37 AM     OBJECTIVE:     /70 (BP Location: Left arm, Patient Position: Chair, Cuff Size: Adult Regular)  Pulse 74  Temp 98.3  F (36.8  C) (Oral)  Resp 16  Wt 63.5 kg (139 lb 14.4 oz)  SpO2 99%  BMI 29.75 kg/m2  Body mass index is 29.75 kg/(m^2).     GENERAL: healthy, alert and no distress  EYES: Eyes grossly normal to inspection, PERRL and conjunctivae and sclerae  normal  RESP: lungs clear to auscultation - no rales, rhonchi or wheezes  CV: regular rate and rhythm, normal S1 S2, no S3 or S4, no murmur, click or rub, no peripheral edema and peripheral pulses strong  MS:right arm is in sling and not mobile  Kyphosis noted  SKIN: no suspicious lesions or rashes  NEURO: , mentation intact and speech normal, using cane  PSYCH: mentation appears normal, affect normal/bright        ASSESSMENT/PLAN:   (G89.4) Chronic pain syndrome  (primary encounter diagnosis)  Comment: Overall controlled, although patient does report difficulty sleeping due to pain. Refilled oxycodone. Continue current meds. F/u 3 months  Plan: oxyCODONE (OXY-IR) 5 MG capsule, oxyCODONE         (ROXICODONE) 5 MG IR tablet, oxyCODONE         (ROXICODONE) 5 MG IR tablet          (I10) Essential hypertension, benign  Comment: Patient's blood pressure was within normal limits. Continue current meds.   Plan: lisinopril (PRINIVIL/ZESTRIL) 10 MG tablet  Follow up 6 months          (Z23) Flu vaccine need  Comment: Administered today  Plan: FLU VACCINE, INCREASED ANTIGEN, PRESV FREE          (H35.30) Macular degeneration (senile) of retina  Comment: Patient is following with ophthalmology. She is getting glasses and will start supplements soon    (E53.8) Vitamin B12 deficiency (non anemic)  Comment: Administered today  Plan: cyanocobalamin (VITAMIN B12) 1000 MCG/ML         injection          (Z12.11) Special screening for malignant neoplasms, colon  Comment: FIT test ordered today  Plan: Fecal colorectal cancer screen (FIT)          F/u 3 months    The information in this document, created by the medical scribe for me, accurately reflects the services I personally performed and the decisions made by me. I have reviewed and approved this document for accuracy prior to leaving the patient care area.  October 2, 2017 10:37 AM    Brittnee Sharma MD  [facility]      Physical Exam

## 2017-10-03 ASSESSMENT — ANXIETY QUESTIONNAIRES: GAD7 TOTAL SCORE: 4

## 2017-11-01 ENCOUNTER — ALLIED HEALTH/NURSE VISIT (OUTPATIENT)
Dept: NURSING | Facility: CLINIC | Age: 67
End: 2017-11-01
Payer: COMMERCIAL

## 2017-11-01 DIAGNOSIS — E53.8 VITAMIN B12 DEFICIENCY WITHOUT ANEMIA: Primary | ICD-10-CM

## 2017-11-01 PROCEDURE — 99207 ZZC NO CHARGE NURSE ONLY: CPT

## 2017-11-01 PROCEDURE — 96372 THER/PROPH/DIAG INJ SC/IM: CPT

## 2017-11-01 NOTE — NURSING NOTE
"Chief Complaint   Patient presents with     Allied Health Visit     Vitamin B12       Initial There were no vitals taken for this visit. Estimated body mass index is 29.75 kg/(m^2) as calculated from the following:    Height as of 1/9/17: 4' 9.5\" (1.461 m).    Weight as of 10/2/17: 139 lb 14.4 oz (63.5 kg).  Medication Reconciliation: unable or not appropriate to perform     Patient comes in for nurse only appointment for Vitamin B12 injection.  Order confirmed as current.  Patient is on scheduled.  Jalyn Sandoval M.A.  "

## 2017-11-01 NOTE — MR AVS SNAPSHOT
"              After Visit Summary   2017    Keiko Kimball    MRN: [id number]           Patient Information     Date Of Birth          1950        Visit Information        Provider Department      2017 1:45 PM CR GOLD MA/LPN [facility]        Today's Diagnoses     Vitamin B12 deficiency without anemia    -  1       Follow-ups after your visit        Who to contact     If you have questions or need follow up information about today's clinic visit or your schedule please contact [facility] directly at [phone].  Normal or non-critical lab and imaging results will be communicated to you by [employer], letter or phone within 4 business days after the clinic has received the results. If you do not hear from us within 7 days, please contact the clinic through [employer] or phone. If you have a critical or abnormal lab result, we will notify you by phone as soon as possible.  Submit refill requests through [employer] or call your pharmacy and they will forward the refill request to us. Please allow 3 business days for your refill to be completed.          Additional Information About Your Visit        MyChart Information     [employer] lets you send messages to your doctor, view your test results, renew your prescriptions, schedule appointments and more. To sign up, go to [email or web address] . Click on \"Log in\" on the left side of the screen, which will take you to the Welcome page. Then click on \"Sign up Now\" on the right side of the page.     You will be asked to enter the access code listed below, as well as some personal information. Please follow the directions to create your username and password.     Your access code is: [email or web address]  Expires: 2017  2:47 PM     Your access code will  in 90 days. If you need help or a new code, please call your [facility] or [phone].        Care EveryWhere ID     This is your Care EveryWhere ID. This could be used " by other organizations to access your [place] medical records  [id number]         Blood Pressure from Last 3 Encounters:   10/02/17 112/70   07/03/17 116/70   04/17/17 120/80    Weight from Last 3 Encounters:   10/02/17 139 lb 14.4 oz (63.5 kg)   07/03/17 140 lb (63.5 kg)   04/17/17 143 lb (64.9 kg)              We Performed the Following     INJECTION INTRAMUSCULAR OR SUB-Q     VITAMIN B12 INJ /1000MCG        Primary Care Provider Office Phone # Fax #    Brittnee Soha Sharma [phone]       [id number]  [address]   [facility] [address]        Equal Access to Services     EDWINA PAIGE : Hadii sheyla jimenezo Sogrant, waaxda luqadaha, qaybta kaalmada adeegyada, marita winn. So [facility] [phone].    ATENCIÓN: Si habla español, tiene a frank disposición servicios gratuitos de asistencia lingüística. Llame al [phone].    We comply with applicable federal civil rights laws and [place] laws. We do not discriminate on the basis of race, color, national origin, age, disability, sex, sexual orientation, or gender identity.            Thank you!     Thank you for choosing [facility]  for your care. Our goal is always to provide you with excellent care. Hearing back from our patients is one way we can continue to improve our services. Please take a few minutes to complete the written survey that you may receive in the mail after your visit with us. Thank you!             Your Updated Medication List - Protect others around you: Learn how to safely use, store and throw away your medicines at www.disposemymeds.org.          This list is accurate as of: 11/1/17  2:44 PM.  Always use your most recent med list.                   Brand Name Dispense Instructions for use Diagnosis    cholecalciferol 1000 UNIT tablet    vitamin D3    120 tablet    Take 2 tablets (2,000 Units) by mouth 4 times daily    Vitamin D deficiency       * cyanocobalamin 1000 MCG/ML  injection    VITAMIN B12    1 mL    Inject 1 mL (1,000 mcg) into the muscle every 30 days    Vitamin B12 deficiency without anemia       * cyanocobalamin 1000 MCG/ML injection    VITAMIN B12    1 mL    Inject 1 mL (1,000 mcg) into the muscle every 30 days    Vitamin B12 deficiency (non anemic)       cyclobenzaprine 10 MG tablet    FLEXERIL    180 tablet    Take 1 tablet (10 mg) by mouth nightly as needed Pt takes 10mg to 20mg as needed at bedtime    Arthritis       diclofenac 1 % Gel topical gel    VOLTAREN    100 g    Apply 2 g topically 4 times daily Apply 4 grams to knees or 2 grams to hands four times daily using enclosed dosing card.    Osteoarthritis of multiple joints, unspecified osteoarthritis type       hydrocortisone 1 % cream    KP HYDROCORTISONE    15 g    Apply small amount to area as needed    Rash       lisinopril 10 MG tablet    PRINIVIL/ZESTRIL    90 tablet    Take 1 tablet (10 mg) by mouth daily    Essential hypertension, benign       meloxicam 7.5 MG tablet    MOBIC    30 tablet    Take 1 tablet (7.5 mg) by mouth every 36 hours Or as needed, not more then 2 times per week    Arthritis       omeprazole 40 MG capsule    priLOSEC    180 capsule    Take 1 capsule (40 mg) by mouth 2 times daily Take 30-60 minutes before a meal.    Peptic ulcer       ONE-A-DAY WITHIN Tabs      Take  by mouth 2 times daily.        order for DME     1 each    One lift-chair for mobilization to use daily.    Osteoporosis, Arm fracture, right, Elbow fracture, right, Radial nerve palsy       order for DME     2 each    Equipment being ordered:  [employer] Walking Lace Shoe~     Pain in limb, Chronic pain syndrome, Osteoporosis, unspecified       * oxyCODONE 5 MG capsule    OXY-IR    60 capsule    Take 1 capsule (5 mg) by mouth every 4 hours as needed for moderate to severe pain    Chronic pain syndrome       * oxyCODONE 5 MG IR tablet   Start taking on:  11/2/2017    ROXICODONE    60 tablet    Take 1  tablet (5 mg) by mouth every 4 hours as needed for pain maximum 2 tablet(s) per day    Chronic pain syndrome       * oxyCODONE 5 MG IR tablet   Start taking on:  12/2/2017    ROXICODONE    60 tablet    Take 1 tablet (5 mg) by mouth every 4 hours as needed for pain maximum 2 tablet(s) per day    Chronic pain syndrome       * Notice:  This list has 5 medication(s) that are the same as other medications prescribed for you. Read the directions carefully, and ask your doctor or other care provider to review them with you.

## 2017-12-07 ENCOUNTER — OFFICE VISIT (OUTPATIENT)
Dept: FAMILY MEDICINE | Facility: CLINIC | Age: 67
End: 2017-12-07
Payer: COMMERCIAL

## 2017-12-07 VITALS
OXYGEN SATURATION: 98 % | DIASTOLIC BLOOD PRESSURE: 82 MMHG | BODY MASS INDEX: 30.64 KG/M2 | TEMPERATURE: 98.3 F | SYSTOLIC BLOOD PRESSURE: 130 MMHG | HEIGHT: 58 IN | HEART RATE: 108 BPM | RESPIRATION RATE: 16 BRPM | WEIGHT: 146 LBS

## 2017-12-07 DIAGNOSIS — E53.8 VITAMIN B12 DEFICIENCY WITHOUT ANEMIA: ICD-10-CM

## 2017-12-07 DIAGNOSIS — L03.119 CELLULITIS OF LOWER EXTREMITY, UNSPECIFIED LATERALITY: Primary | ICD-10-CM

## 2017-12-07 PROCEDURE — 99213 OFFICE O/P EST LOW 20 MIN: CPT | Mod: 25 | Performed by: FAMILY MEDICINE

## 2017-12-07 PROCEDURE — 96372 THER/PROPH/DIAG INJ SC/IM: CPT | Performed by: FAMILY MEDICINE

## 2017-12-07 RX ORDER — CEPHALEXIN 500 MG/1
500 CAPSULE ORAL 2 TIMES DAILY
Qty: 20 CAPSULE | Refills: 0 | Status: SHIPPED | OUTPATIENT
Start: 2017-12-07 | End: 2018-02-02

## 2017-12-07 NOTE — MR AVS SNAPSHOT
"              After Visit Summary   2017    Keiko Kimball    MRN: [id number]           Patient Information     Date Of Birth          1950        Visit Information        Provider Department      2017 12:00 PM Madalyn Villalpando, DO [facility]        Today's Diagnoses     Cellulitis of lower extremity, unspecified laterality    -  1       Follow-ups after your visit        Who to contact     If you have questions or need follow up information about today's clinic visit or your schedule please contact [facility] directly at [phone].  Normal or non-critical lab and imaging results will be communicated to you by [employer], letter or phone within 4 business days after the clinic has received the results. If you do not hear from us within 7 days, please contact the clinic through [employer] or phone. If you have a critical or abnormal lab result, we will notify you by phone as soon as possible.  Submit refill requests through [employer] or call your pharmacy and they will forward the refill request to us. Please allow 3 business days for your refill to be completed.          Additional Information About Your Visit        MyChart Information     [employer] lets you send messages to your doctor, view your test results, renew your prescriptions, schedule appointments and more. To sign up, go to [email or web address] . Click on \"Log in\" on the left side of the screen, which will take you to the Welcome page. Then click on \"Sign up Now\" on the right side of the page.     You will be asked to enter the access code listed below, as well as some personal information. Please follow the directions to create your username and password.     Your access code is: [email or web address]  Expires: 3/7/2018 12:29 PM     Your access code will  in 90 days. If you need help or a new code, please call your [facility] or [phone].        Care EveryWhere ID     This is your Care EveryWhere " "ID. This could be used by other organizations to access your [place] medical records  [id number]        Your Vitals Were     Pulse Temperature Respirations Height Pulse Oximetry BMI (Body Mass Index)    108 98.3  F (36.8  C) (Oral) 16 4' 9.5\" (1.461 m) 98% 31.05 kg/m2       Blood Pressure from Last 3 Encounters:   12/07/17 130/82   10/02/17 112/70   07/03/17 116/70    Weight from Last 3 Encounters:   12/07/17 146 lb (66.2 kg)   10/02/17 139 lb 14.4 oz (63.5 kg)   07/03/17 140 lb (63.5 kg)              Today, you had the following     No orders found for display         Today's Medication Changes          These changes are accurate as of: 12/7/17 12:29 PM.  If you have any questions, ask your nurse or doctor.               Start taking these medicines.        Dose/Directions    cephALEXin 500 MG capsule   Commonly known as:  KEFLEX   Used for:  Cellulitis of lower extremity, unspecified laterality   Started by:  Madalyn Villalpando,         Dose:  500 mg   Take 1 capsule (500 mg) by mouth 2 times daily   Quantity:  20 capsule   Refills:  0            Where to get your medicines      These medications were sent to [facility] [place] [facility] [id number] [place] [address]  [address] [facility] [address]     Phone:  [phone]     cephALEXin 500 MG capsule                Primary Care Provider Office Phone # Fax #    Brittnee Soha Sharma [phone]       [address]   [facility] [address]        Equal Access to Services     [facility] AH: Hadii sheyla ramirez hadasho Soomaali, waaxda luqadaha, qaybta kaalmada nomiyacari, marita winn. So [facility] [phone].    ATENCIÓN: Si habla español, tiene a frank disposición servicios gratuitos de asistencia lingüística. Llame al [phone].    We comply with applicable federal civil rights laws and [place] laws. We do not discriminate on the basis of race, color, national origin, age, disability, sex, sexual " orientation, or gender identity.            Thank you!     Thank you for choosing [facility]  for your care. Our goal is always to provide you with excellent care. Hearing back from our patients is one way we can continue to improve our services. Please take a few minutes to complete the written survey that you may receive in the mail after your visit with us. Thank you!             Your Updated Medication List - Protect others around you: Learn how to safely use, store and throw away your medicines at www.disposemymeds.org.          This list is accurate as of: 12/7/17 12:29 PM.  Always use your most recent med list.                   Brand Name Dispense Instructions for use Diagnosis    cephALEXin 500 MG capsule    KEFLEX    20 capsule    Take 1 capsule (500 mg) by mouth 2 times daily    Cellulitis of lower extremity, unspecified laterality       cholecalciferol 1000 UNIT tablet    vitamin D3    120 tablet    Take 2 tablets (2,000 Units) by mouth 4 times daily    Vitamin D deficiency       * cyanocobalamin 1000 MCG/ML injection    VITAMIN B12    1 mL    Inject 1 mL (1,000 mcg) into the muscle every 30 days    Vitamin B12 deficiency without anemia       * cyanocobalamin 1000 MCG/ML injection    VITAMIN B12    1 mL    Inject 1 mL (1,000 mcg) into the muscle every 30 days    Vitamin B12 deficiency (non anemic)       cyclobenzaprine 10 MG tablet    FLEXERIL    180 tablet    Take 1 tablet (10 mg) by mouth nightly as needed Pt takes 10mg to 20mg as needed at bedtime    Arthritis       diclofenac 1 % Gel topical gel    VOLTAREN    100 g    Apply 2 g topically 4 times daily Apply 4 grams to knees or 2 grams to hands four times daily using enclosed dosing card.    Osteoarthritis of multiple joints, unspecified osteoarthritis type       hydrocortisone 1 % cream    KP HYDROCORTISONE    15 g    Apply small amount to area as needed    Rash       lisinopril 10 MG tablet    PRINIVIL/ZESTRIL    90 tablet     Take 1 tablet (10 mg) by mouth daily    Essential hypertension, benign       meloxicam 7.5 MG tablet    MOBIC    30 tablet    Take 1 tablet (7.5 mg) by mouth every 36 hours Or as needed, not more then 2 times per week    Arthritis       omeprazole 40 MG capsule    priLOSEC    180 capsule    Take 1 capsule (40 mg) by mouth 2 times daily Take 30-60 minutes before a meal.    Peptic ulcer       ONE-A-DAY WITHIN Tabs      Take  by mouth 2 times daily.        order for DME     1 each    One lift-chair for mobilization to use daily.    Osteoporosis, Arm fracture, right, Elbow fracture, right, Radial nerve palsy       order for DME     2 each    Equipment being ordered:  [employer] Walking Lace Shoe~     Pain in limb, Chronic pain syndrome, Osteoporosis, unspecified       oxyCODONE IR 5 MG tablet    ROXICODONE    60 tablet    Take 1 tablet (5 mg) by mouth every 4 hours as needed for pain maximum 2 tablet(s) per day    Chronic pain syndrome       * Notice:  This list has 2 medication(s) that are the same as other medications prescribed for you. Read the directions carefully, and ask your doctor or other care provider to review them with you.

## 2017-12-07 NOTE — PROGRESS NOTES
SUBJECTIVE:   Keiko Kimball is a 67 year old female who presents to clinic today for the following health issues:      Musculoskeletal problem/pain      Duration: 2 weeks    Description  Location: both feet    Intensity:  Severe at times    Accompanying signs and symptoms: swelling    History  Previous similar problem: no   Previous evaluation:  none    Precipitating or alleviating factors:  Trauma or overuse: no   Aggravating factors include: none    Therapies tried and outcome: nothing    Patient developed swelling in both feet shortly after cutting some dead skin off the bottom of her feet.  She notes that she was a little aggressive when removing the skin and had some open sores.  She put Voltaren gel on afterwards.  Since then has had swelling and pain in her feet.  Swelling gets better when she elevates her feet and worsens throughout the day.  No fevers or chills.      Problem list and histories reviewed & adjusted, as indicated.  Additional history: as documented    Patient Active Problem List   Diagnosis     Carpal tunnel syndrome     Atypical face pain     Vitamin D deficiency     Osteoporosis     Peptic ulcer     Compression fracture of lumbar vertebra (H)     Reflex sympathetic dystrophy of the arm     Fibromyalgia     Bell's palsy     OA (osteoarthritis) of knee     Seasonal allergic rhinitis     CARDIOVASCULAR SCREENING; LDL GOAL LESS THAN 160     Rotator cuff tear     Pseudogout     Hip fracture, right (H)     Bisphosphonate-related jaw necrosis (H)     Aftercare following joint replacement     Pain in joint, shoulder region     GERD (gastroesophageal reflux disease)     Chronic pain syndrome     Trigger finger, acquired     Arthralgia of both knees     Chronic dislocation of shoulder, right     Vitamin B12 deficiency without anemia     Elevated blood pressure     Other iron deficiency anemia     Other insomnia     Essential hypertension, benign     Intractable vomiting without nausea, vomiting of  unspecified type     History of Yoandy-en-Y gastric bypass     Uncomplicated opioid dependence (H)     Complex regional pain syndrome type 1 of right upper extremity     Hyperparathyroidism (H)     Advanced directives, counseling/discussion     Macular degeneration (senile) of retina     Past Surgical History:   Procedure Laterality Date     ARTHROPLASTY ELBOW  3/8/2012    Procedure:ARTHROPLASTY ELBOW; Right Total Elbow Arthroplasty Complex, Right Metal Revision; Surgeon:LARON STEVENSON; Location:[facility]     C APPENDECTOMY       C EXCIS KNEE CARTILAGE,MEDIAL & LAT  1994    Lt knee     C FOOT/TOES SURGERY PROC UNLISTED  1995    Lt foot , tendon repair     C LIGATE ETHMOID ARTERY  1997    developed facial pain syndrome post surgeries     C LIGATE INTERN MAXILL ARTERY  1997     C SHOULDER SURG PROC UNLISTED  8/2006    Rt shoulder replacement surgery     GASTRIC BYPASS  1981, 2001    bypass 25 yrs ago, revised in 2001     HC REVISE MEDIAN N/CARPAL TUNNEL SURG  1999     INTERPOSITION TENDON HAND  6/28/2012    Procedure: INTERPOSITION TENDON HAND;  Right Arm  Radial Nerve Tendon Transfers, Pronator Terres  to Extensor Carpi Radialis Brevis, Palmaris Longus to Extensor Pollicis Longus. Flexor Carpi Ulnaris to Extensor Digitorum Communis  ;  Surgeon: Laron Stevenson MD;  Location:  OR     ORTHOPEDIC SURGERY  2011    shoulder- replacement     RELEASE TRIGGER FINGER  4/23/2013    Procedure: RELEASE TRIGGER FINGER;  Left Index, Middle and Ring Trigger Finger Releases.;  Surgeon: Laron Stevenson MD;  Location[facility]     ROTATOR CUFF REPAIR RT/LT  2004    rt side     TONSILLECTOMY         Social History   Substance Use Topics     Smoking status: Never Smoker     Smokeless tobacco: Never Used     Alcohol use Yes      Comment: rarely     Family History   Problem Relation Age of Onset     CANCER Mother      lymphoma     CANCER Brother      esophageal ca             Reviewed and updated as needed this visit  "by clinical staff     Reviewed and updated as needed this visit by Provider         ROS:  Constitutional, HEENT, cardiovascular, pulmonary, gi and gu systems are negative, except as otherwise noted.      OBJECTIVE:   /82 (BP Location: Left arm, Patient Position: Chair, Cuff Size: Adult Regular)  Pulse 108  Temp 98.3  F (36.8  C) (Oral)  Resp 16  Ht 4' 9.5\" (1.461 m)  Wt 146 lb (66.2 kg)  SpO2 98%  BMI 31.05 kg/m2  Body mass index is 31.05 kg/(m^2).  GENERAL: healthy, alert and no distress  CARDIO: RRR, no murmurs  RESP: CTAB  MS: Bilateral plantar aspects of feet are erythematous and warm.  Multiple healing lacerations on bottom of feet as well.  +3 pitting edema bilaterally up to ankles.      ASSESSMENT/PLAN:       ICD-10-CM    1. Cellulitis of lower extremity, unspecified laterality L03.119 cephALEXin (KEFLEX) 500 MG capsule     Patient looks to have a cellulitis on the bottoms of both feet.  Will treat that with Keflex.  Discussed that swelling is common with cellulitis, but she has quite a bit more swelling than I would expect.  She has an echo from 2011 that showed some aortic root dilitation and slight decrease in EF.  Discussed that if swelling does not improve with abx we should consider checking an echo again.      Follow up on Monday for recheck     Madalyn Villalpando, DO  [facility]  "

## 2018-01-02 ENCOUNTER — TELEPHONE (OUTPATIENT)
Dept: FAMILY MEDICINE | Facility: CLINIC | Age: 68
End: 2018-01-02

## 2018-01-02 DIAGNOSIS — I10 ESSENTIAL HYPERTENSION, BENIGN: ICD-10-CM

## 2018-01-02 DIAGNOSIS — G89.4 CHRONIC PAIN SYNDROME: ICD-10-CM

## 2018-01-02 NOTE — TELEPHONE ENCOUNTER
Reason for call:  Medication   If this is a refill request, has the caller requested the refill from the pharmacy already? No  Will the patient be using a [facility]? No  Name of the pharmacy and phone number for the current request: Patient will  paper prescription for Oxycodone 5 Mg IR tablet, you can send prescription for Lisinopril to [facility] pharmacy in [place], [place].  Name of the medication requested: Norco, Lisinopril    Other request: patient had to cancel her 9:40am appointment with Dr Sharma today because she didn't have a ride.  I advised patient that Dr Sharma's next available appointment is not until 01/22/18. Patient wants to know if maybe she could go and see Dr Villalpando in [place] and maybe get her prescriptions refilled by Dr Villalpando. Patient would like to have Dr Sharma or a nurse call her back to advise her what she should do to get her medications refilled.      Phone number to reach patient:  Home phone number :  [phone]    Best Time:  anytime    Can we leave a detailed message on this number?  YES

## 2018-01-02 NOTE — TELEPHONE ENCOUNTER
Pt calling again stating she only wants to speak to Charly.  Informed her charly is away at the moment and offered to help.  Pt finally let writer help.    Pt informed to called the pharmacy about lisinopril as she should have a refill at the pharmacy (sent for 6 months in October)    She needs her oxycodone filled but wants this to be at the [facility] because she has a ride, and lives a block away, from this clinic.      Dr Sharma- Please advise on how to proceed with refill since pt can't get to  clinic to get hard copy and has no one else to pick it up for her.    Lisa Montes De Oca RN, BSN

## 2018-01-03 ENCOUNTER — ALLIED HEALTH/NURSE VISIT (OUTPATIENT)
Dept: NURSING | Facility: CLINIC | Age: 68
End: 2018-01-03
Payer: COMMERCIAL

## 2018-01-03 DIAGNOSIS — D50.8 OTHER IRON DEFICIENCY ANEMIA: Primary | ICD-10-CM

## 2018-01-03 PROCEDURE — 96372 THER/PROPH/DIAG INJ SC/IM: CPT

## 2018-01-03 PROCEDURE — 99207 ZZC NO CHARGE NURSE ONLY: CPT

## 2018-01-03 NOTE — MR AVS SNAPSHOT
"              After Visit Summary   1/3/2018    Keiko Kimball    MRN: [id number]           Patient Information     Date Of Birth          1950        Visit Information        Provider Department      1/3/2018 9:15 AM SANTY COWAN MA/NATHALY [facility]        Today's Diagnoses     Other iron deficiency anemia    -  1       Follow-ups after your visit        Your next 10 appointments already scheduled     Jan 27, 2018 11:00 AM CST   SHORT with Brittnee Sharma MD   [facility]    [address] [place] [id number] [phone]              Who to contact     If you have questions or need follow up information about today's clinic visit or your schedule please contact [facility] directly at [phone].  Normal or non-critical lab and imaging results will be communicated to you by MyChart, letter or phone within 4 business days after the clinic has received the results. If you do not hear from us within 7 days, please contact the clinic through MyChart or phone. If you have a critical or abnormal lab result, we will notify you by phone as soon as possible.  Submit refill requests through [employer] or call your pharmacy and they will forward the refill request to us. Please allow 3 business days for your refill to be completed.          Additional Information About Your Visit        MyChart Information     [employer] lets you send messages to your doctor, view your test results, renew your prescriptions, schedule appointments and more. To sign up, go to [email or web address] . Click on \"Log in\" on the left side of the screen, which will take you to the Welcome page. Then click on \"Sign up Now\" on the right side of the page.     You will be asked to enter the access code listed below, as well as some personal information. Please follow the directions to create your username and password.     Your access code " is: [email or web address]  Expires: 3/7/2018 12:29 PM     Your access code will  in 90 days. If you need help or a new code, please call your [facility] clinic or [phone].        Care EveryWhere ID     This is your Care EveryWhere ID. This could be used by other organizations to access your [facility] medical records  [id number]         Blood Pressure from Last 3 Encounters:   17 130/82   10/02/17 112/70   17 116/70    Weight from Last 3 Encounters:   17 146 lb (66.2 kg)   10/02/17 139 lb 14.4 oz (63.5 kg)   17 140 lb (63.5 kg)              We Performed the Following     INJECTION INTRAMUSCULAR OR SUB-Q     VITAMIN B12 INJ /1000MCG        Primary Care Provider Office Phone # Fax #    Brittnee Soha Sharma [phone]       [id number]  [address]   [facility] [address]        Equal Access to Services     [facility] AH: Hadii aad ku hadasho Soomaali, waaxda luqadaha, qaybta kaalmada adeegyada, waxay idiin haywicho rubin . So [facility] [phone].    ATENCIÓN: Si habla español, tiene a frank disposición servicios gratuitos de asistencia lingüística. Llame al [phone].    We comply with applicable federal civil rights laws and [place] laws. We do not discriminate on the basis of race, color, national origin, age, disability, sex, sexual orientation, or gender identity.            Thank you!     Thank you for choosing [facility]  for your care. Our goal is always to provide you with excellent care. Hearing back from our patients is one way we can continue to improve our services. Please take a few minutes to complete the written survey that you may receive in the mail after your visit with us. Thank you!             Your Updated Medication List - Protect others around you: Learn how to safely use, store and throw away your medicines at www.disposemymeds.org.          This list is accurate as of: 1/3/18  9:26 AM.  Always use your most recent med  list.                   Brand Name Dispense Instructions for use Diagnosis    cephALEXin 500 MG capsule    KEFLEX    20 capsule    Take 1 capsule (500 mg) by mouth 2 times daily    Cellulitis of lower extremity, unspecified laterality       cholecalciferol 1000 UNIT tablet    vitamin D3    120 tablet    Take 2 tablets (2,000 Units) by mouth 4 times daily    Vitamin D deficiency       * cyanocobalamin 1000 MCG/ML injection    VITAMIN B12    1 mL    Inject 1 mL (1,000 mcg) into the muscle every 30 days    Vitamin B12 deficiency without anemia       * cyanocobalamin 1000 MCG/ML injection    VITAMIN B12    1 mL    Inject 1 mL (1,000 mcg) into the muscle every 30 days    Vitamin B12 deficiency (non anemic)       cyclobenzaprine 10 MG tablet    FLEXERIL    180 tablet    Take 1 tablet (10 mg) by mouth nightly as needed Pt takes 10mg to 20mg as needed at bedtime    Arthritis       diclofenac 1 % Gel topical gel    VOLTAREN    100 g    Apply 2 g topically 4 times daily Apply 4 grams to knees or 2 grams to hands four times daily using enclosed dosing card.    Osteoarthritis of multiple joints, unspecified osteoarthritis type       hydrocortisone 1 % cream    KP HYDROCORTISONE    15 g    Apply small amount to area as needed    Rash       lisinopril 10 MG tablet    PRINIVIL/ZESTRIL    90 tablet    Take 1 tablet (10 mg) by mouth daily    Essential hypertension, benign       meloxicam 7.5 MG tablet    MOBIC    30 tablet    Take 1 tablet (7.5 mg) by mouth every 36 hours Or as needed, not more then 2 times per week    Arthritis       omeprazole 40 MG capsule    priLOSEC    180 capsule    Take 1 capsule (40 mg) by mouth 2 times daily Take 30-60 minutes before a meal.    Peptic ulcer       ONE-A-DAY WITHIN Tabs      Take  by mouth 2 times daily.        order for DME     1 each    One lift-chair for mobilization to use daily.    Osteoporosis, Arm fracture, right, Elbow fracture, right, Radial nerve palsy       order for DME     2 each     Equipment being ordered:  [employer] Walking Lace Shoe~     Pain in limb, Chronic pain syndrome, Osteoporosis, unspecified       oxyCODONE IR 5 MG tablet    ROXICODONE    60 tablet    Take 1 tablet (5 mg) by mouth every 4 hours as needed for pain maximum 2 tablet(s) per day    Chronic pain syndrome       * Notice:  This list has 2 medication(s) that are the same as other medications prescribed for you. Read the directions carefully, and ask your doctor or other care provider to review them with you.

## 2018-01-03 NOTE — TELEPHONE ENCOUNTER
Pt calling to check status of refill for oxycodone. She does have an appointment with Dr. Sharma on 1/24      Yang Mcfarland   01/03/18 3:25 PM

## 2018-01-03 NOTE — TELEPHONE ENCOUNTER
Chronic pain guideline are seeing patient every 3 months. I then give her refills for the following 3 months. She is out of her rx, as she is due for an appointment.

## 2018-01-03 NOTE — NURSING NOTE
Patient comes in on the nurse only schedule for a Vitamin B12 injection.  Patient tolerated well.  Jalyn Sandoval M.A.

## 2018-01-03 NOTE — TELEPHONE ENCOUNTER
I don't see that rx was ordered, are you planning to print it out tomorrow when in office??  Pt wants to  [facility], not sure how to manage that. We can't fax this rx.    Donya Shanks RN, BS  Clinical Nurse Triage.

## 2018-01-04 RX ORDER — LISINOPRIL 10 MG/1
10 TABLET ORAL DAILY
Qty: 90 TABLET | Refills: 1 | Status: SHIPPED | OUTPATIENT
Start: 2018-01-04 | End: 2018-06-22

## 2018-01-04 RX ORDER — OXYCODONE HYDROCHLORIDE 5 MG/1
5 TABLET ORAL EVERY 4 HOURS PRN
Qty: 60 TABLET | Refills: 0 | Status: SHIPPED | OUTPATIENT
Start: 2018-01-04 | End: 2018-02-02

## 2018-01-04 NOTE — TELEPHONE ENCOUNTER
Will print rx, but next time, we can only give rx at the appointment, so needs to make the appt sooner, is printed at

## 2018-02-02 ENCOUNTER — OFFICE VISIT (OUTPATIENT)
Dept: FAMILY MEDICINE | Facility: CLINIC | Age: 68
End: 2018-02-02
Payer: COMMERCIAL

## 2018-02-02 ENCOUNTER — TELEPHONE (OUTPATIENT)
Dept: FAMILY MEDICINE | Facility: CLINIC | Age: 68
End: 2018-02-02

## 2018-02-02 VITALS
SYSTOLIC BLOOD PRESSURE: 134 MMHG | WEIGHT: 144.7 LBS | BODY MASS INDEX: 30.37 KG/M2 | HEIGHT: 58 IN | DIASTOLIC BLOOD PRESSURE: 78 MMHG | TEMPERATURE: 98 F | OXYGEN SATURATION: 100 % | RESPIRATION RATE: 20 BRPM | HEART RATE: 84 BPM

## 2018-02-02 DIAGNOSIS — G89.4 CHRONIC PAIN SYNDROME: Primary | ICD-10-CM

## 2018-02-02 DIAGNOSIS — M24.411 CHRONIC DISLOCATION OF SHOULDER, RIGHT: ICD-10-CM

## 2018-02-02 DIAGNOSIS — M19.90 ARTHRITIS: ICD-10-CM

## 2018-02-02 DIAGNOSIS — H35.30 MACULAR DEGENERATION (SENILE) OF RETINA: ICD-10-CM

## 2018-02-02 DIAGNOSIS — Z12.11 SCREEN FOR COLON CANCER: ICD-10-CM

## 2018-02-02 PROCEDURE — 99214 OFFICE O/P EST MOD 30 MIN: CPT | Performed by: FAMILY MEDICINE

## 2018-02-02 RX ORDER — ANTIOX #8/OM3/DHA/EPA/LUT/ZEAX 250-2.5 MG
240 CAPSULE ORAL 2 TIMES DAILY WITH MEALS
COMMUNITY

## 2018-02-02 RX ORDER — CYCLOBENZAPRINE HCL 10 MG
10 TABLET ORAL
Qty: 180 TABLET | Refills: 3 | Status: SHIPPED | OUTPATIENT
Start: 2018-02-02 | End: 2018-10-17 | Stop reason: SINTOL

## 2018-02-02 RX ORDER — OXYCODONE HYDROCHLORIDE 5 MG/1
5 TABLET ORAL EVERY 4 HOURS PRN
Qty: 60 TABLET | Refills: 0 | Status: SHIPPED | OUTPATIENT
Start: 2018-03-02 | End: 2018-05-01 | Stop reason: ALTCHOICE

## 2018-02-02 RX ORDER — OXYCODONE HYDROCHLORIDE 5 MG/1
5 TABLET ORAL EVERY 4 HOURS PRN
Qty: 60 TABLET | Refills: 0 | Status: SHIPPED | OUTPATIENT
Start: 2018-04-02 | End: 2018-05-01

## 2018-02-02 RX ORDER — OXYCODONE HYDROCHLORIDE 5 MG/1
5 TABLET ORAL EVERY 4 HOURS PRN
Qty: 60 TABLET | Refills: 0 | Status: SHIPPED | OUTPATIENT
Start: 2018-02-02 | End: 2018-05-01 | Stop reason: ALTCHOICE

## 2018-02-02 ASSESSMENT — PAIN SCALES - GENERAL: PAINLEVEL: EXTREME PAIN (8)

## 2018-02-02 NOTE — TELEPHONE ENCOUNTER
Prior Authorization Retail Medication Request  Medication/Dose: cyclobenzaprine (FLEXERIL) 10 MG tablet  Diagnosis and ICD code: M19.90  New/Renewal/Insurance Change PA: New, Pt did have a PA done but now has new insurance.    Previously Tried and Failed Therapies: Ibuprofen 400mg, meloxicam (MOBIC) 7.5 MG tablet, oxyCODONE IR (ROXICODONE) 5 MG tablet.  All not effective alone.    Cover My Meds Key:  [id number]     Any additional info from fax request: Stable on Flexeril    If you received a fax notification from an outside Pharmacy:  [facility] Name:[facility]  Telephone Fax   [phone]

## 2018-02-02 NOTE — NURSING NOTE
"Chief Complaint   Patient presents with     Pain     CHRONIC PAIN     Refill Request     Imm/Inj     B-12     Initial /78 (BP Location: Left arm, Patient Position: Chair, Cuff Size: Adult Regular)  Pulse 84  Temp 98  F (36.7  C) (Oral)  Resp 20  Ht 4' 9.5\" (1.461 m)  Wt 144 lb 11.2 oz (65.6 kg)  SpO2 100%  BMI 30.77 kg/m2 Estimated body mass index is 30.77 kg/(m^2) as calculated from the following:    Height as of this encounter: 4' 9.5\" (1.461 m).    Weight as of this encounter: 144 lb 11.2 oz (65.6 kg).  BP completed using cuff size regular LEFT arm.    Lisa Magill, CMA    "

## 2018-02-02 NOTE — PROGRESS NOTES
Screening Questionnaire for Adult Immunization    Are you sick today?   No   Do you have allergies to medications, food, a vaccine component or latex?   Yes   Have you ever had a serious reaction after receiving a vaccination?   No   Do you have a long-term health problem with heart disease, lung disease, asthma, kidney disease, metabolic disease (e.g. diabetes), anemia, or other blood disorder?   No   Do you have cancer, leukemia, HIV/AIDS, or any other immune system problem?   No   In the past 3 months, have you taken medications that affect  your immune system, such as prednisone, other steroids, or anticancer drugs; drugs for the treatment of rheumatoid arthritis, Crohn s disease, or psoriasis; or have you had radiation treatments?   No   Have you had a seizure, or a brain or other nervous system problem?   No   During the past year, have you received a transfusion of blood or blood     products, or been given immune (gamma) globulin or antiviral drug?   No   For women: Are you pregnant or is there a chance you could become        pregnant during the next month?   No   Have you received any vaccinations in the past 4 weeks?   No     Immunization questionnaire was positive for at least one answer.  Notified Dr. Sharma .        Per orders of Dr. Sharma, injection of B-12 injection  given by Lisa A. Magill. Patient instructed to remain in clinic for 15 minutes afterwards, and to report any adverse reaction to me immediately.       Screening performed by Lisa A. Magill on 2/2/2018 at 1:23 PM.

## 2018-02-02 NOTE — PROGRESS NOTES
HPI   SUBJECTIVE:   Keiko Kimball is a 67 year old female who presents to clinic today for the following health issues:    Chronic Pain Follow-Up       Type / Location of Pain: BOTH SHOULDERS, BOTH KNEES and LOW BACK   Analgesia/pain control:       Recent changes:  worse      Overall control: Tolerable with discomfort  Activity level/function:      Daily activities:  Able to do light housework, cooking    Work:  not applicable  Adverse effects:  No  Adherance    Taking medication as directed?  Yes    Participating in other treatments: no   Risk Factors:    Sleep:  Poor    Mood/anxiety:  controlled    Recent family or social stressors:  none noted    Other aggravating factors: prolonged standing  PHQ-9 SCORE 9/10/2015 10/5/2015 10/2/2017   Total Score 0 8 0     NEETA-7 SCORE 10/5/2015 10/24/2016 10/2/2017   Total Score 0 0 4     Encounter-Level CSA - 07/10/2015:          Controlled Substance Agreement - Scan on 7/15/2015  3:09 PM : [facility] Controlled Substance Agreement 7-10-15 (below)                Amount of exercise or physical activity: TWICE A DAY/6-7 days/week for an average of 15-30 minutes    Problems taking medications regularly: No    Medication side effects: none    Diet: regular (no restrictions)    Pt says her left knee has been giving her pain. This began last night. The knee is very painful, and she could not move much, twist, sleep in bed, etc last night. Injections have helped to relieve pain in her knees in the past. She has not tried using Voltaren gel yet, and heat has not seemed to help it calm down.     Injections   Monthly B12 injections have helped to relieve patient's pain in the past.     Carpal tunnel   Pt still has carpal tunnel symptoms    Face pain  Pt still has pain in her face.     Vomiting   Vomited up food and medication today.     Cellulitis infection   Pt previously received antibiotic for a cellulitis infection in her ankles.     Pain medication   Takes Oxycodone, 2 a  "day.     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 She takes pills 2X a day to help manage this.     Arm brace   Would like a new prosthetic brace for her right arm when sleeping at night.     Sleep   Flexoril helps her relax but doesn't really seem to help her get to sleep. Because of this, she feels fatigued throughout the day. Also takes extra strength tylenol and oxycodone at night before bed.     SOC  Pays someone to drive her to clinic. Lives alone.      PROBLEMS TO ADD ON...    Problem list and histories reviewed & adjusted, as indicated.  Additional history: as documented    BP Readings from Last 3 Encounters:   02/02/18 134/78   12/07/17 130/82   10/02/17 112/70    Wt Readings from Last 3 Encounters:   02/02/18 65.6 kg (144 lb 11.2 oz)   12/07/17 66.2 kg (146 lb)   10/02/17 63.5 kg (139 lb 14.4 oz)         Labs reviewed in EPIC    Reviewed and updated as needed this visit by clinical staff  Tobacco  Allergies  Meds  Problems  Med Hx  Surg Hx  Fam Hx  Soc Hx        Reviewed and updated as needed this visit by Provider       ROS:  Constitutional, HEENT, cardiovascular, pulmonary, gi and gu systems are negative, except as otherwise noted.    This document serves as a record of the services and decisions personally performed and made by Brittnee Sharma MD. It was created on his/her behalf by Ady Jean, a trained medical scribe. The creation of this document is based the provider's statements to the medical scribe.  Scribfish Jean 11:02 AM, February 2, 2018  OBJECTIVE:     /78 (BP Location: Left arm, Patient Position: Chair, Cuff Size: Adult Regular)  Pulse 84  Temp 98  F (36.7  C) (Oral)  Resp 20  Ht 1.461 m (4' 9.5\")  Wt 65.6 kg (144 lb 11.2 oz)  SpO2 100%  BMI 30.77 kg/m2  Body mass index is 30.77 kg/(m^2).  GENERAL: healthy, alert and no distress  EYES: Eyes grossly normal to inspection, conjunctivae and sclerae " normal  RESP: lungs clear to auscultation - no rales, rhonchi or wheezes  CV: regular rate and rhythm, l  MS: no gross musculoskeletal defects noted. Swollen and warm left knee, right normal. Ankles normal w/o edema.   Right arm held to side, unable to lift or use the right shoulder, due to chronic long term dislocation  Kyphosis that is severe. Using cane  SKIN: no suspicious lesions or rashes  NEURO: Normal strength and tone, mentation intact and speech normal  PSYCH: mentation appears normal, affect normal/bright    Diagnostic Test Results:  none     ASSESSMENT/PLAN:   (G89.4) Chronic pain syndrome  (primary encounter diagnosis)  Comment: Stable on Oxycodone, continue on medication. Follow up 3 mo   Plan: oxyCODONE IR (ROXICODONE) 5 MG tablet,         oxyCODONE IR (ROXICODONE) 5 MG tablet,         oxyCODONE IR (ROXICODONE) 5 MG tablet          (M19.90) Arthritis  Comment: Stable on Flexeril, continue on medication. Never take more than 1 tab, this med is sedating and not recommended in her age group, pt understands and does not use it often and only when laying in bed  Knee pain is new, use heat, rest and Follow up with [facility] per pt   Plan: cyclobenzaprine (FLEXERIL) 10 MG tablet          (Z12.11) Screen for colon cancer  Comment: Pt received FIT tests for colon cancer screening.   Plan: Fecal colorectal cancer screen FIT - Future         (S+30)          (M24.411) Chronic dislocation of shoulder, right  Comment: Orthotics referral, order placed for prosthetic arm brace.   Plan: ORTHOTICS REFERRAL          (H35.30) Macular degeneration (senile) of retina  Comment: Continue on Preservision medication per ophthalmology.       RTC in 3 months for follow-up.     The information in this document, created by the medical scribe for me, accurately reflects the services I personally performed and the decisions made by me. I have reviewed and approved this document for accuracy prior to leaving the patient care area.  Brittnee  MD Zachary  11:02 AM, 02/02/18    Brittnee Sharma MD  [facility]    Physical Exam

## 2018-02-02 NOTE — MR AVS SNAPSHOT
After Visit Summary   2/2/2018    Keiko Kimball    MRN: [id number]           Patient Information     Date Of Birth          1950        Visit Information        Provider Department      2/2/2018 10:40 AM Brittnee Sharma MD [facility]        Today's Diagnoses     Chronic pain syndrome    -  1    Arthritis        Screen for colon cancer        Chronic dislocation of shoulder, right        Macular degeneration (senile) of retina           Follow-ups after your visit        Additional Services     ORTHOTICS REFERRAL       **This referral order prints off in the [facility]  (Orthotics & Prosthetics) Central Scheduling Office**    The [facility] Central Scheduling Staff will contact the patient to schedule appointments.     Central Scheduling Contact Information: [phone] ([place])    UPPER arm splint for dislocated shoulder, which is chronic and permanent     Please be aware that coverage of these services is subject to the terms and limitations of your health insurance plan.  Call member services at your health plan with any benefit or coverage questions.      Please bring the following to your appointment:    >>   Any x-rays, CTs or MRIs which have been performed.  Contact the facility where they were done to arrange for  prior to your scheduled appointment.    >>   List of current medications   >>   This referral request   >>   Any documents/labs given to you for this referral                  Follow-up notes from your care team     Return in about 3 months (around 5/2/2018).      Future tests that were ordered for you today     Open Future Orders        Priority Expected Expires Ordered    Fecal colorectal cancer screen FIT - Future (S+30) Routine 2/23/2018 3/4/2018 2/2/2018            Who to contact     If you have questions or need follow up information about today's clinic visit or your schedule please contact [facility]  "[place] directly at [phone].  Normal or non-critical lab and imaging results will be communicated to you by MyChart, letter or phone within 4 business days after the clinic has received the results. If you do not hear from us within 7 days, please contact the clinic through Yobongoh[employer] or phone. If you have a critical or abnormal lab result, we will notify you by phone as soon as possible.  Submit refill requests through Particle Code or call your pharmacy and they will forward the refill request to us. Please allow 3 business days for your refill to be completed.          Additional Information About Your Visit        Particle Code Information     Particle Code lets you send messages to your doctor, view your test results, renew your prescriptions, schedule appointments and more. To sign up, go to [email or web address] . Click on \"Log in\" on the left side of the screen, which will take you to the Welcome page. Then click on \"Sign up Now\" on the right side of the page.     You will be asked to enter the access code listed below, as well as some personal information. Please follow the directions to create your username and password.     Your access code is: [email or web address]  Expires: 3/7/2018 12:29 PM     Your access code will  in 90 days. If you need help or a new code, please call your [facility] clinic or [phone].        Care EveryWhere ID     This is your Care EveryWhere ID. This could be used by other organizations to access your [facility] medical records  [id number]        Your Vitals Were     Pulse Temperature Respirations Height Pulse Oximetry BMI (Body Mass Index)    84 98  F (36.7  C) (Oral) 20 4' 9.5\" (1.461 m) 100% 30.77 kg/m2       Blood Pressure from Last 3 Encounters:   18 134/78   17 130/82   10/02/17 112/70    Weight from Last 3 Encounters:   18 144 lb 11.2 oz (65.6 kg)   17 146 lb (66.2 kg)   10/02/17 139 lb 14.4 oz (63.5 kg)              We Performed the Following     ORTHOTICS " REFERRAL          Today's Medication Changes          These changes are accurate as of 2/2/18 11:02 AM.  If you have any questions, ask your nurse or doctor.               These medicines have changed or have updated prescriptions.        Dose/Directions    cyclobenzaprine 10 MG tablet   Commonly known as:  FLEXERIL   This may have changed:  additional instructions   Used for:  Arthritis   Changed by:  Brittnee Sharma MD        Dose:  10 mg   Take 1 tablet (10 mg) by mouth nightly as needed   Quantity:  180 tablet   Refills:  3       * oxyCODONE IR 5 MG tablet   Commonly known as:  ROXICODONE   This may have changed:  Another medication with the same name was added. Make sure you understand how and when to take each.   Used for:  Chronic pain syndrome   Changed by:  Brittnee Sharma MD        Dose:  5 mg   Take 1 tablet (5 mg) by mouth every 4 hours as needed for pain maximum 2 tablet(s) per day   Quantity:  60 tablet   Refills:  0       * oxyCODONE IR 5 MG tablet   Commonly known as:  ROXICODONE   This may have changed:  You were already taking a medication with the same name, and this prescription was added. Make sure you understand how and when to take each.   Used for:  Chronic pain syndrome   Changed by:  Brittnee Sharma MD        Dose:  5 mg   Start taking on:  3/2/2018   Take 1 tablet (5 mg) by mouth every 4 hours as needed for pain maximum 2 tablet(s) per day   Quantity:  60 tablet   Refills:  0       * oxyCODONE IR 5 MG tablet   Commonly known as:  ROXICODONE   This may have changed:  You were already taking a medication with the same name, and this prescription was added. Make sure you understand how and when to take each.   Used for:  Chronic pain syndrome   Changed by:  Brittnee Sharma MD        Dose:  5 mg   Start taking on:  4/2/2018   Take 1 tablet (5 mg) by mouth every 4 hours as needed for pain maximum 2 tablet(s) per day   Quantity:  60 tablet   Refills:  0       *  Notice:  This list has 3 medication(s) that are the same as other medications prescribed for you. Read the directions carefully, and ask your doctor or other care provider to review them with you.         Where to get your medicines      These medications were sent to [id number] [address] AT [place] [address]  [address] [facility] [address]    Hours:  24-hours Phone:  [phone]     cyclobenzaprine 10 MG tablet         Some of these will need a paper prescription and others can be bought over the counter.  Ask your nurse if you have questions.     Bring a paper prescription for each of these medications     oxyCODONE IR 5 MG tablet    oxyCODONE IR 5 MG tablet    oxyCODONE IR 5 MG tablet                Primary Care Provider Office Phone # Fax #    Brittnee Soha Sharma [phone]       [id number]  [address]   [facility] [address]        Equal Access to Services     EDWINA PAIGE AH: Hadii aad ku hadasho Soomaali, waaxda luqadaha, qaybta kaalmada adeegyada, waxay idiin hayvalentinen nomi rubin . So [facility] [phone].    ATENCIÓN: Si habla español, tiene a frank disposición servicios gratuitos de asistencia lingüística. Llame al [phone].    We comply with applicable federal civil rights laws and [place] laws. We do not discriminate on the basis of race, color, national origin, age, disability, sex, sexual orientation, or gender identity.            Thank you!     Thank you for choosing [facility]  for your care. Our goal is always to provide you with excellent care. Hearing back from our patients is one way we can continue to improve our services. Please take a few minutes to complete the written survey that you may receive in the mail after your visit with us. Thank you!             Your Updated Medication List - Protect others around you: Learn how to safely use, store and throw away your medicines at  www.disposemymeds.org.          This list is accurate as of 2/2/18 11:02 AM.  Always use your most recent med list.                   Brand Name Dispense Instructions for use Diagnosis    cholecalciferol 1000 UNIT tablet    vitamin D3    120 tablet    Take 2 tablets (2,000 Units) by mouth 4 times daily    Vitamin D deficiency       * cyanocobalamin 1000 MCG/ML injection    VITAMIN B12    1 mL    Inject 1 mL (1,000 mcg) into the muscle every 30 days    Vitamin B12 deficiency without anemia       * cyanocobalamin 1000 MCG/ML injection    VITAMIN B12    1 mL    Inject 1 mL (1,000 mcg) into the muscle every 30 days    Vitamin B12 deficiency (non anemic)       cyclobenzaprine 10 MG tablet    FLEXERIL    180 tablet    Take 1 tablet (10 mg) by mouth nightly as needed    Arthritis       diclofenac 1 % Gel topical gel    VOLTAREN    100 g    Apply 2 g topically 4 times daily Apply 4 grams to knees or 2 grams to hands four times daily using enclosed dosing card.    Osteoarthritis of multiple joints, unspecified osteoarthritis type       hydrocortisone 1 % cream    KP HYDROCORTISONE    15 g    Apply small amount to area as needed    Rash       lisinopril 10 MG tablet    PRINIVIL/ZESTRIL    90 tablet    Take 1 tablet (10 mg) by mouth daily    Essential hypertension, benign       meloxicam 7.5 MG tablet    MOBIC    30 tablet    Take 1 tablet (7.5 mg) by mouth every 36 hours Or as needed, not more then 2 times per week    Arthritis       omeprazole 40 MG capsule    priLOSEC    180 capsule    Take 1 capsule (40 mg) by mouth 2 times daily Take 30-60 minutes before a meal.    Peptic ulcer       ONE-A-DAY WITHIN Tabs      Take  by mouth 2 times daily.        order for DME     1 each    One lift-chair for mobilization to use daily.    Osteoporosis, Arm fracture, right, Elbow fracture, right, Radial nerve palsy       order for DME     2 each    Equipment being ordered:  [employer] Walking Lace Shoe~     Pain in  limb, Chronic pain syndrome, Osteoporosis, unspecified       * oxyCODONE IR 5 MG tablet    ROXICODONE    60 tablet    Take 1 tablet (5 mg) by mouth every 4 hours as needed for pain maximum 2 tablet(s) per day    Chronic pain syndrome       * oxyCODONE IR 5 MG tablet   Start taking on:  3/2/2018    ROXICODONE    60 tablet    Take 1 tablet (5 mg) by mouth every 4 hours as needed for pain maximum 2 tablet(s) per day    Chronic pain syndrome       * oxyCODONE IR 5 MG tablet   Start taking on:  4/2/2018    ROXICODONE    60 tablet    Take 1 tablet (5 mg) by mouth every 4 hours as needed for pain maximum 2 tablet(s) per day    Chronic pain syndrome       PRESERVISION AREDS 2 Caps      Take 240 mg by mouth 2 times daily (with meals)        * Notice:  This list has 5 medication(s) that are the same as other medications prescribed for you. Read the directions carefully, and ask your doctor or other care provider to review them with you.

## 2018-02-02 NOTE — TELEPHONE ENCOUNTER
Central Prior Authorization Team   Phone: [phone]    PA Initiation    Medication: cyclobenzaprine (FLEXERIL) 10 MG tablet  Insurance Company: [employer] - Phone [phone] Fax [phone]  Pharmacy Filling the Rx: [facility] [address] [place] [address] [address] AT [place] [address]  Filling Pharmacy Phone: [phone]  Filling Pharmacy Fax:    Start Date: 2/2/2018

## 2018-02-03 PROCEDURE — G0328 FECAL BLOOD SCRN IMMUNOASSAY: HCPCS | Performed by: FAMILY MEDICINE

## 2018-02-05 NOTE — TELEPHONE ENCOUNTER
Prior Authorization Approval    Authorization Effective Date: 2/2/2018  Authorization Expiration Date: 12/31/2099  Medication: cyclobenzaprine (FLEXERIL) 10 MG tablet  Approved Dose/Quantity:    Reference #:     Insurance Company: [employer] - Phone [phone] Fax [phone]  Expected CoPay: 2.80     CoPay Card Available:      Foundation Assistance Needed:    Which Pharmacy is filling the prescription (Not needed for infusion/clinic administered): [facility] [address] [place] [address] [id number] AT [place] [address]  Pharmacy Notified: Yes  Patient Notified: Yes

## 2018-02-11 LAB — HEMOCCULT STL QL IA: NEGATIVE

## 2018-02-12 DIAGNOSIS — Z12.11 SCREEN FOR COLON CANCER: ICD-10-CM

## 2018-03-02 ENCOUNTER — ALLIED HEALTH/NURSE VISIT (OUTPATIENT)
Dept: NURSING | Facility: CLINIC | Age: 68
End: 2018-03-02
Payer: COMMERCIAL

## 2018-03-02 DIAGNOSIS — E53.8 VITAMIN B12 DEFICIENCY WITHOUT ANEMIA: Primary | ICD-10-CM

## 2018-03-02 PROCEDURE — 99207 ZZC NO CHARGE NURSE ONLY: CPT

## 2018-03-02 PROCEDURE — 96372 THER/PROPH/DIAG INJ SC/IM: CPT

## 2018-03-22 ENCOUNTER — TELEPHONE (OUTPATIENT)
Dept: FAMILY MEDICINE | Facility: CLINIC | Age: 68
End: 2018-03-22

## 2018-03-22 DIAGNOSIS — L03.119 CELLULITIS OF LOWER EXTREMITY, UNSPECIFIED LATERALITY: ICD-10-CM

## 2018-03-22 RX ORDER — CEPHALEXIN 500 MG/1
500 CAPSULE ORAL 2 TIMES DAILY
Qty: 20 CAPSULE | Refills: 0 | Status: CANCELLED | OUTPATIENT
Start: 2018-03-22

## 2018-03-22 NOTE — TELEPHONE ENCOUNTER
Is it red? Does she have a fever? Is it warm. We should be seeing her if there are signs of infection.

## 2018-03-22 NOTE — TELEPHONE ENCOUNTER
Yes, it is warm and there is minimal redness.  No fever noted.    She states that she is unable to be seen.  Will find another route.    Shruthi Russo RN

## 2018-03-22 NOTE — TELEPHONE ENCOUNTER
Patient calling stating she is having increasing pain/discomfort with her left knee.  She is scheduled to have a cortisone injection on Tuesday, 3/27/2018 with [facility] but is wondering what she can do for the pain/discomfort in the meantime.  She is wondering if she could get an rx for Keflex?  She states that the last time something like this happened she got this rx from Dr. Villalpando and it really helped.    Office visit notes from 2/2/2018 with Dr. Sharma regarding left knee:  MS: no gross musculoskeletal defects noted. Swollen and warm left knee, right normal. Ankles normal w/o edema.     RX t'd up.  Please approve.  Patient would like a call back by 1pm today so she can have her medication delivered.    Shruthi Russo RN

## 2018-04-06 ENCOUNTER — ALLIED HEALTH/NURSE VISIT (OUTPATIENT)
Dept: NURSING | Facility: CLINIC | Age: 68
End: 2018-04-06
Payer: COMMERCIAL

## 2018-04-06 DIAGNOSIS — E53.8 VITAMIN B12 DEFICIENCY (NON ANEMIC): Primary | ICD-10-CM

## 2018-04-06 PROCEDURE — 99207 ZZC NO CHARGE NURSE ONLY: CPT

## 2018-04-06 PROCEDURE — 96372 THER/PROPH/DIAG INJ SC/IM: CPT

## 2018-05-01 ENCOUNTER — OFFICE VISIT (OUTPATIENT)
Dept: PODIATRY | Facility: CLINIC | Age: 68
End: 2018-05-01
Payer: COMMERCIAL

## 2018-05-01 ENCOUNTER — OFFICE VISIT (OUTPATIENT)
Dept: FAMILY MEDICINE | Facility: CLINIC | Age: 68
End: 2018-05-01
Payer: COMMERCIAL

## 2018-05-01 VITALS
SYSTOLIC BLOOD PRESSURE: 128 MMHG | BODY MASS INDEX: 28.98 KG/M2 | OXYGEN SATURATION: 100 % | HEIGHT: 57 IN | TEMPERATURE: 98.7 F | HEART RATE: 88 BPM | RESPIRATION RATE: 16 BRPM | DIASTOLIC BLOOD PRESSURE: 84 MMHG | WEIGHT: 134.3 LBS

## 2018-05-01 VITALS
DIASTOLIC BLOOD PRESSURE: 84 MMHG | WEIGHT: 134.3 LBS | HEIGHT: 57 IN | SYSTOLIC BLOOD PRESSURE: 128 MMHG | BODY MASS INDEX: 28.98 KG/M2

## 2018-05-01 DIAGNOSIS — S32.050S CLOSED COMPRESSION FRACTURE OF FIFTH LUMBAR VERTEBRA, SEQUELA: Primary | ICD-10-CM

## 2018-05-01 DIAGNOSIS — R11.0 CHRONIC NAUSEA: ICD-10-CM

## 2018-05-01 DIAGNOSIS — L84 SKIN CALLUS: ICD-10-CM

## 2018-05-01 DIAGNOSIS — M20.41 HAMMER TOES OF BOTH FEET: ICD-10-CM

## 2018-05-01 DIAGNOSIS — M21.42 PES PLANUS OF BOTH FEET: ICD-10-CM

## 2018-05-01 DIAGNOSIS — M79.672 PAIN IN BOTH FEET: Primary | ICD-10-CM

## 2018-05-01 DIAGNOSIS — M21.41 PES PLANUS OF BOTH FEET: ICD-10-CM

## 2018-05-01 DIAGNOSIS — M20.42 HAMMER TOES OF BOTH FEET: ICD-10-CM

## 2018-05-01 DIAGNOSIS — S91.209A TRAUMATIC AVULSION OF NAIL PLATE OF TOE, INITIAL ENCOUNTER: ICD-10-CM

## 2018-05-01 DIAGNOSIS — M79.671 PAIN IN BOTH FEET: Primary | ICD-10-CM

## 2018-05-01 DIAGNOSIS — G90.511 COMPLEX REGIONAL PAIN SYNDROME TYPE 1 OF RIGHT UPPER EXTREMITY: ICD-10-CM

## 2018-05-01 DIAGNOSIS — G89.4 CHRONIC PAIN SYNDROME: ICD-10-CM

## 2018-05-01 DIAGNOSIS — M79.7 FIBROMYALGIA: ICD-10-CM

## 2018-05-01 PROCEDURE — 99203 OFFICE O/P NEW LOW 30 MIN: CPT | Performed by: PODIATRIST

## 2018-05-01 PROCEDURE — 99213 OFFICE O/P EST LOW 20 MIN: CPT | Performed by: FAMILY MEDICINE

## 2018-05-01 RX ORDER — OXYCODONE HYDROCHLORIDE 5 MG/1
5 TABLET ORAL EVERY 6 HOURS PRN
Qty: 60 TABLET | Refills: 0 | Status: SHIPPED | OUTPATIENT
Start: 2018-06-01 | End: 2018-10-17

## 2018-05-01 RX ORDER — OXYCODONE HYDROCHLORIDE 5 MG/1
5 TABLET ORAL EVERY 6 HOURS PRN
Qty: 60 TABLET | Refills: 0 | Status: SHIPPED | OUTPATIENT
Start: 2018-07-01 | End: 2018-10-17

## 2018-05-01 RX ORDER — OXYCODONE HYDROCHLORIDE 5 MG/1
5 TABLET ORAL EVERY 4 HOURS PRN
Qty: 60 TABLET | Refills: 0 | Status: SHIPPED | OUTPATIENT
Start: 2018-05-01 | End: 2018-08-07

## 2018-05-01 NOTE — PROGRESS NOTES
HPI   SUBJECTIVE:   Keiko Kimball is a 67 year old female who presents to clinic today for the following health issues:    Chronic Pain Follow-Up       Type / Location of Pain: ALL OVER   Analgesia/pain control:       Recent changes:  same      Overall control: Tolerable with discomfort  Activity level/function:      Daily activities:  Able to do light housework, cooking    Work:  not applicable  Adverse effects:  No  Adherance    Taking medication as directed?  Yes    Participating in other treatments: no   Risk Factors:    Sleep:  Fair    Mood/anxiety:  controlled    Recent family or social stressors:  APARTMENT BUILDING ISSUES    Other aggravating factors: prolonged sitting, prolonged standing and repetitive activities - COOKING   PHQ-9 SCORE 9/10/2015 10/5/2015 10/2/2017   Total Score 0 8 0     NEETA-7 SCORE 10/5/2015 10/24/2016 10/2/2017   Total Score 0 0 4     Encounter-Level CSA - 07/10/2015:          Controlled Substance Agreement - Scan on 7/15/2015  3:09 PM : [facility] Controlled Substance Agreement 7-10-15 (below)                Amount of exercise or physical activity: 2-3 days/week for an average of 15-30 minutes    Problems taking medications regularly: No    Medication side effects: none    Diet: regular (no restrictions)    Taking oxycodone. She takes about half a tablet (2.5 mg). Does not take it every 4 hours, sometimes she does not need to. Notes that the lower back pain worsens now when walking. She does not want to go to the Pain Clinic. She inquired about medical marijuana for pain and the chronic nausea.    Foot   Patient has a podiatry appointment today. Notes that her foot has really improved.     Osteoporosis  Patient reports that she has shrunk. She takes calcium but does not take any vitamin D supplements, instead she prefers to go outside in the sun.     Chronic nausea  She has lost some weight, about 10 lbs since 2/2/2018. She has been vomiting a lot recently. She tries to eat any  food she can that will stay down. She likes to make chicken and dumpling soup.       Problem list and histories reviewed & adjusted, as indicated.  Additional history: as documented    Recent Labs   Lab Test  07/03/17   1055  01/09/17   1041  06/30/16   1132  01/04/16   1108   09/12/14   1046  09/06/13   0836  06/11/12   1035   LDL   --    --   99   --    --   77  85  118   HDL   --    --   57   --    --   69  62  61   TRIG   --    --   116   --    --   77  74  113   ALT   --   17  17  16   < >  17  29   --    CR  0.60  0.56  0.58  0.59   < >  0.60  0.55  0.56   GFRESTIMATED  >90  Non  GFR Calc    >90  Non  GFR Calc    >90  Non  GFR Calc    >90  Non  GFR Calc     < >  >90  Non  GFR Calc    >90  >90   GFRESTBLACK  >90   GFR Calc    >90   GFR Calc    >90   GFR Calc    >90   GFR Calc     < >  >90   GFR Calc    >90  >90   POTASSIUM  3.9  3.8  3.7  4.0   < >  4.0  4.0  4.2   TSH   --    --    --    --    --   3.00   --   3.72    < > = values in this interval not displayed.      BP Readings from Last 3 Encounters:   05/01/18 128/84   02/02/18 134/78   12/07/17 130/82    Wt Readings from Last 3 Encounters:   05/01/18 60.9 kg (134 lb 4.8 oz)   02/02/18 65.6 kg (144 lb 11.2 oz)   12/07/17 66.2 kg (146 lb)         Labs reviewed in EPIC    Reviewed and updated as needed this visit by clinical staff  Tobacco  Allergies  Meds       Reviewed and updated as needed this visit by Provider       ROS:  Constitutional, HEENT, cardiovascular, pulmonary, gi and gu systems are negative, except as otherwise noted.    This document serves as a record of the services and decisions personally performed and made by Brittnee Sharma MD. It was created on her behalf by Patrica Rice, a trained medical scribe. The creation of this document is based on the provider's statements to the  "medical scribe.  Patrica Rice May 1, 2018 10:59 AM     OBJECTIVE:     /84 (BP Location: Left arm, Patient Position: Chair, Cuff Size: Adult Regular)  Pulse 88  Temp 98.7  F (37.1  C) (Oral)  Resp 16  Ht 1.441 m (4' 8.75\")  Wt 60.9 kg (134 lb 4.8 oz)  SpO2 100%  BMI 29.32 kg/m2  Body mass index is 29.32 kg/(m^2).     GENERAL: healthy, alert and no distress  RESP: lungs clear to auscultation - no rales, rhonchi or wheezes  CV: regular rate and rhythm, normal S1 S2, no S3 or S4, no murmur, click or rub, no peripheral edema and peripheral pulses strong  MS: no gross musculoskeletal defects noted, no edema, right shoulder dislocated, in sling  Kyphosis noted  SKIN: no suspicious lesions or rashes  NEURO: Normal strength and tone, mentation intact and speech normal  PSYCH: mentation appears normal, affect normal/bright    Diagnostic Test Results:  none     ASSESSMENT/PLAN:   (S32.050S) Closed compression fracture of fifth lumbar vertebra, sequela  (primary encounter diagnosis)  Comment: Ongoing back pain. Overall managing with pain meds.     (G89.4) Chronic pain syndrome  Comment: Refilled rx. Discussed that patient does qualify as a candidate for medical marijuana. Advised her that she cannot be on narcotics and medical marijuana at the same time. Patient wishes to look more into the cost before proceeding with the process.   Plan: oxyCODONE IR (ROXICODONE) 5 MG tablet,         oxyCODONE IR (ROXICODONE) 5 MG tablet,         oxyCODONE IR (ROXICODONE) 5 MG tablet          (G90.511) Complex regional pain syndrome type 1 of right upper extremity  Comment: Ongoing pain. Fairly managed with meds. Refilled oxycodone     (M79.7) Fibromyalgia  Comment: As above    (R11.0) Chronic nausea  Comment: Patient has lost 10 lbs in 3 months, continues to vomit daily. Discussed medical marijuana for pain and nausea, patient is going to look more into the cost first. Continue with eating food that will stay down.    Follow up " in 3 months    The information in this document, created by the medical scribe for me, accurately reflects the services I personally performed and the decisions made by me. I have reviewed and approved this document for accuracy prior to leaving the patient care area.  May 1, 2018 10:59 AM    Brittnee Sharma MD  [facility]      Physical Exam

## 2018-05-01 NOTE — MR AVS SNAPSHOT
After Visit Summary   5/1/2018    Keiko Kimball    MRN: [id number]           Patient Information     Date Of Birth          1950        Visit Information        Provider Department      5/1/2018 11:30 AM Kerrie Alanis DPM, Podiatry/Foot and Ankle Surgery [facility]        Care Instructions    Thank you for choosing [facility]!    DR. ALANIS'S CLINIC SCHEDULE  MONDAY AM - [place] TUESDAY - [place]   [address] [address] [place] [address] [place] [address]   [phone] / [phone] / [phone]       WEDNESDAY - [place] FRIDAY AM - [facility]   [address]   [place] [address] [place] [address]   [phone] / [phone]       FRIDAY PM - [place] SCHEDULE SURGERY: [phone]   [address] BILLING QUESTIONS: [phone]   [place] [address] AFTER HOURS: [phone]   [phone] / [phone] APPOINTMENTS: [phone]       CALLUS / CORNS / IPKs  When there is excessive friction or pressure on the skin, the body responds by making the skin thicker to protect the deeper structures from becoming exposed. While this works well to protect the deeper structures, the thickened skin can increase pressure and pain.    CALLUS: Flat, diffuse thickening are simple calluses and they are usually caused by friction. Often these are the result of rubbing on a shoe or going barefoot.    CORNS: Calluses with a central core between the toes are called corns. These result from prominent joints on adjacent toes rubbing together. Theses are a symptom of bone malalignment and will always recur unless the underlying bones are addressed surgically.    IPKs: Calluses with a central core on the ball of the foot are usually IPKs (intractable plantar keratosis). These are caused by excessive pressure from the metatarsals, the bones that make up the  ball of the foot. Often one of these bones is too long or too prominent.  Again, these will always recur unless the underlying bone issue is addressed. There is no cure for these. They will either go away by themselves, recur, or more could develop.    ROUTINE MAINTENANCE  1. File them down with a pumice stone or callus file a couple times a week.   2. An electric callus removing device. Amope Pedi Perfect Electronic Pedicure Foot File and Callus Remover can be a good option.   3. Lotion can be applied to soften the callus. A urea based cream such as Kersal or Vanicream or thicker cream with shea butter are good options.  4. Toe spacers or toe covers can be used for corns, gel pads can be used for other lesions on the bottom of the foot.   If there is a surgical pathology noted, such as a prominent bone, often this needs to be addressed surgically to minimize recurrence. However, sometimes the lesion simply migrates to another spot after surgery, so it is not a guaranteed cure.             Body Mass Index (BMI)  Many things can cause foot and ankle problems. Foot structure, activity level, foot mechanics and injuries are common causes of pain.  One very important issue that often goes unmentioned, is body weight. Extra weight can cause increased stress on muscles, ligaments, bones and tendons.  Sometimes just a few extra pounds is all it takes to put one over her/his threshold. Without reducing that stress, it can be difficult to alleviate pain. Some people are uncomfortable addressing this issue, but we feel it is important for you to think about it. As Foot &  Ankle specialists, our job is addressing the lower extremity problem and possible causes. Regarding extra body weight, we encourage patients to discuss diet and weight management plans with their primary care doctors. It is this team approach that gives you the best opportunity for pain relief and getting you back on your feet.                  Follow-ups  "after your visit        Your next 10 appointments already scheduled     May 15, 2018 11:30 AM CDT   New Visit with Kerrie Alanis DPM, Podiatry/Foot [facility]le Surgery   [facility]    [address] [facility] [id number] [phone]              Who to contact     If you have questions or need follow up information about today's clinic visit or your schedule please contact [facility] directly at [phone].  Normal or non-critical lab and imaging results will be communicated to you by VectorLearning[employer], letter or phone within 4 business days after the clinic has received the results. If you do not hear from us within 7 days, please contact the clinic through Cater to ut or phone. If you have a critical or abnormal lab result, we will notify you by phone as soon as possible.  Submit refill requests through [employer] or call your pharmacy and they will forward the refill request to us. Please allow 3 business days for your refill to be completed.          Additional Information About Your Visit        [facility] Information     [employer] lets you send messages to your doctor, view your test results, renew your prescriptions, schedule appointments and more. To sign up, go to [email or web address] . Click on \"Log in\" on the left side of the screen, which will take you to the Welcome page. Then click on \"Sign up Now\" on the right side of the page.     You will be asked to enter the access code listed below, as well as some personal information. Please follow the directions to create your username and password.     Your access code is: [email or web address]  Expires: 2018  1:26 PM     Your access code will  in 90 days. If you need help or a new code, please call your [facility] or [phone].        Care EveryWhere ID     This is your Care EveryWhere ID. This could be used by other organizations to access your [place] medical " "records  [id number]        Your Vitals Were     Height BMI (Body Mass Index)                4' 8.75\" (1.441 m) 29.32 kg/m2           Blood Pressure from Last 3 Encounters:   05/01/18 128/84   05/01/18 128/84   02/02/18 134/78    Weight from Last 3 Encounters:   05/01/18 134 lb 4.8 oz (60.9 kg)   05/01/18 134 lb 4.8 oz (60.9 kg)   02/02/18 144 lb 11.2 oz (65.6 kg)              Today, you had the following     No orders found for display         Today's Medication Changes          These changes are accurate as of 5/1/18 11:54 AM.  If you have any questions, ask your nurse or doctor.               These medicines have changed or have updated prescriptions.        Dose/Directions    cyanocobalamin 1000 MCG/ML injection   Commonly known as:  VITAMIN B12   This may have changed:  Another medication with the same name was removed. Continue taking this medication, and follow the directions you see here.   Used for:  Vitamin B12 deficiency (non anemic)   Changed by:  Brittnee Sharma MD        Dose:  1 mL   Inject 1 mL (1,000 mcg) into the muscle every 30 days   Quantity:  1 mL   Refills:  11       * oxyCODONE IR 5 MG tablet   Commonly known as:  ROXICODONE   This may have changed:  Another medication with the same name was changed. Make sure you understand how and when to take each.   Used for:  Chronic pain syndrome   Changed by:  Brittnee Sharma MD        Dose:  5 mg   Take 1 tablet (5 mg) by mouth every 4 hours as needed for pain maximum 2 tablet(s) per day   Quantity:  60 tablet   Refills:  0       * oxyCODONE IR 5 MG tablet   Commonly known as:  ROXICODONE   This may have changed:    - when to take this  - reasons to take this   Used for:  Chronic pain syndrome   Changed by:  Brittnee Sharma MD        Dose:  5 mg   Start taking on:  6/1/2018   Take 1 tablet (5 mg) by mouth every 6 hours as needed for severe pain maximum 2 tablet(s) per day   Quantity:  60 tablet   Refills:  0       * oxyCODONE " IR 5 MG tablet   Commonly known as:  ROXICODONE   This may have changed:    - when to take this  - reasons to take this   Used for:  Chronic pain syndrome   Changed by:  Brittnee Sharma MD        Dose:  5 mg   Start taking on:  7/1/2018   Take 1 tablet (5 mg) by mouth every 6 hours as needed for severe pain maximum 2 tablet(s) per day   Quantity:  60 tablet   Refills:  0       * Notice:  This list has 3 medication(s) that are the same as other medications prescribed for you. Read the directions carefully, and ask your doctor or other care provider to review them with you.         Where to get your medicines      Some of these will need a paper prescription and others can be bought over the counter.  Ask your nurse if you have questions.     Bring a paper prescription for each of these medications     oxyCODONE IR 5 MG tablet    oxyCODONE IR 5 MG tablet    oxyCODONE IR 5 MG tablet                Primary Care Provider Office Phone # Fax #    Brittnee Sharma [phone]       [address]   [facility] [address]        Equal Access to Services     [facility]: Hadii sheyla ku hadasho Soomaali, waaxda luqadaha, qaybta kaalmada adeegyacari, waxsangeeta rubin . So [facility] [phone].    ATENCIÓN: Si habla español, tiene a frank disposición servicios gratuitos de asistencia lingüística. Llame al [phone].    We comply with applicable federal civil rights laws and [place] laws. We do not discriminate on the basis of race, color, national origin, age, disability, sex, sexual orientation, or gender identity.            Thank you!     Thank you for choosing [facility]  for your care. Our goal is always to provide you with excellent care. Hearing back from our patients is one way we can continue to improve our services. Please take a few minutes to complete the written survey that you may receive in the mail after your visit with us. Thank you!              Your Updated Medication List - Protect others around you: Learn how to safely use, store and throw away your medicines at www.disposemymeds.org.          This list is accurate as of 5/1/18 11:54 AM.  Always use your most recent med list.                   Brand Name Dispense Instructions for use Diagnosis    cholecalciferol 1000 UNIT tablet    vitamin D3    120 tablet    Take 2 tablets (2,000 Units) by mouth 4 times daily    Vitamin D deficiency       cyanocobalamin 1000 MCG/ML injection    VITAMIN B12    1 mL    Inject 1 mL (1,000 mcg) into the muscle every 30 days    Vitamin B12 deficiency (non anemic)       cyclobenzaprine 10 MG tablet    FLEXERIL    180 tablet    Take 1 tablet (10 mg) by mouth nightly as needed    Arthritis       diclofenac 1 % Gel topical gel    VOLTAREN    100 g    Apply 2 g topically 4 times daily Apply 4 grams to knees or 2 grams to hands four times daily using enclosed dosing card.    Osteoarthritis of multiple joints, unspecified osteoarthritis type       hydrocortisone 1 % cream    KP HYDROCORTISONE    15 g    Apply small amount to area as needed    Rash       lisinopril 10 MG tablet    PRINIVIL/ZESTRIL    90 tablet    Take 1 tablet (10 mg) by mouth daily    Essential hypertension, benign       meloxicam 7.5 MG tablet    MOBIC    30 tablet    Take 1 tablet (7.5 mg) by mouth every 36 hours Or as needed, not more then 2 times per week    Arthritis       omeprazole 40 MG capsule    priLOSEC    180 capsule    Take 1 capsule (40 mg) by mouth 2 times daily Take 30-60 minutes before a meal.    Peptic ulcer       ONE-A-DAY WITHIN Tabs      Take  by mouth 2 times daily.        order for DME     1 each    One lift-chair for mobilization to use daily.    Osteoporosis, Arm fracture, right, Elbow fracture, right, Radial nerve palsy       order for DME     2 each    Equipment being ordered:  [employer] Walking Lace Shoe~     Pain in limb, Chronic pain syndrome,  Osteoporosis, unspecified       * oxyCODONE IR 5 MG tablet    ROXICODONE    60 tablet    Take 1 tablet (5 mg) by mouth every 4 hours as needed for pain maximum 2 tablet(s) per day    Chronic pain syndrome       * oxyCODONE IR 5 MG tablet   Start taking on:  6/1/2018    ROXICODONE    60 tablet    Take 1 tablet (5 mg) by mouth every 6 hours as needed for severe pain maximum 2 tablet(s) per day    Chronic pain syndrome       * oxyCODONE IR 5 MG tablet   Start taking on:  7/1/2018    ROXICODONE    60 tablet    Take 1 tablet (5 mg) by mouth every 6 hours as needed for severe pain maximum 2 tablet(s) per day    Chronic pain syndrome       PRESERVISION AREDS 2 Caps      Take 240 mg by mouth 2 times daily (with meals)        * Notice:  This list has 3 medication(s) that are the same as other medications prescribed for you. Read the directions carefully, and ask your doctor or other care provider to review them with you.

## 2018-05-01 NOTE — LETTER
"    5/1/2018         RE: Keiko Kimball  [address]   [facility] [address]        Dear Colleague,    Thank you for referring your patient, Keiko Kimball, to the [facility]. Please see a copy of my visit note below.    PATIENT HISTORY:  Keiko Kimball is a 67 year old female who presents to clinic for assessment of the left great toe. She notes she banged her toe on a post. Notes the nail partially fell off. Was red. Has been soaking. No pain today. No drainage. Wants to make sure it is okay. Also would like to know what the \"hard lumps\" on her left heel and right foot on the bottom.  No fevers, chills.     Review of Systems:  Patient denies fever, chills, rash, wound, stiffness, limping, numbness, weakness, heart burn, blood in stool, chest pain with activity, calf pain when walking, shortness of breath with activity, chronic cough, easy bleeding/bruising, swelling of ankles, excessive thirst, fatigue, depression, anxiety.  .     PAST MEDICAL HISTORY:   Past Medical History:   Diagnosis Date     Anesthesia complication     cornea scratched left eye     Atypical face pain      Bariatric surgery status 2001    initial surgery staples in 1981, revised in 2001     Better eye: severe vision impairment; lesser eye: blind, 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     Rt side of face     Bisphosphonate-related jaw necrosis (H)      Carpal tunnel syndrome      Carpal tunnel syndrome 1998    Both MCP jts of thumbs removed     Fibromyalgia      Gastro-oesophageal reflux disease      Generalized osteoarthrosis, unspecified site      Macular degeneration ~January 2014    LEFT     Myalgia and myositis, unspecified      Osteoarthritis      Other and unspecified disc disorder of lumbar region      Other osteoporosis      Pain, arm, right     LIMITED MOBILITY OF RIGHT ARM     Peptic ulcer, unspecified site, unspecified as acute or chronic, without mention of hemorrhage or perforation 2001    pt had ulcer found " during bariatric surgery     Pseudogout      Reflex sympathetic dystrophy of the upper limb     BILATERAL- HISTORY     Reflex sympathetic dystrophy, unspecified     bilateral     Sciatica     Both lower etm     Seasonal allergic rhinitis      Torn rotator cuff     LEFT     Trigger finger (acquired)     thumb bilateral     Ulcer of esophagus     As per pt     Unspecified hearing loss     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     Unspecified vitamin D deficiency 5/2007    Pt has low vit d, high PTH, causing osteoporosis, fractures        PAST SURGICAL HISTORY:   Past Surgical History:   Procedure Laterality Date     ARTHROPLASTY ELBOW  3/8/2012    Procedure:ARTHROPLASTY ELBOW; Right Total Elbow Arthroplasty Complex, Right Metal Revision; Surgeon:DONA STEVENSON; Location:[facility]     C APPENDECTOMY       C EXCIS KNEE CARTILAGE,MEDIAL & LAT  1994    Lt knee     C FOOT/TOES SURGERY PROC UNLISTED  1995    Lt foot , tendon repair     C LIGATE ETHMOID ARTERY  1997    developed facial pain syndrome post surgeries     C LIGATE INTERN MAXILL ARTERY  1997     C SHOULDER SURG PROC UNLISTED  8/2006    Rt shoulder replacement surgery     GASTRIC BYPASS  1981, 2001    bypass 25 yrs ago, revised in 2001     HC REVISE MEDIAN N/CARPAL TUNNEL SURG  1999     INTERPOSITION TENDON HAND  6/28/2012    Procedure: INTERPOSITION TENDON HAND;  Right Arm  Radial Nerve Tendon Transfers, Pronator Terres  to Extensor Carpi Radialis Brevis, Palmaris Longus to Extensor Pollicis Longus. Flexor Carpi Ulnaris to Extensor Digitorum Communis  ;  Surgeon: Dona Stevenson MD;  Location:  OR     ORTHOPEDIC SURGERY  2011    shoulder- replacement     RELEASE TRIGGER FINGER  4/23/2013    Procedure: RELEASE TRIGGER FINGER;  Left Index, Middle and Ring Trigger Finger Releases.;  Surgeon: Dona Stevenson MD;  Location:  OR     ROTATOR CUFF REPAIR RT/LT  2004    rt side     TONSILLECTOMY          MEDICATIONS:   Current Outpatient Prescriptions:       cholecalciferol (VITAMIN D3) 1000 UNIT tablet, Take 2 tablets (2,000 Units) by mouth 4 times daily, Disp: 120 tablet, Rfl: 2     cyanocobalamin (VITAMIN B12) 1000 MCG/ML injection, Inject 1 mL (1,000 mcg) into the muscle every 30 days, Disp: 1 mL, Rfl: 11     cyclobenzaprine (FLEXERIL) 10 MG tablet, Take 1 tablet (10 mg) by mouth nightly as needed, Disp: 180 tablet, Rfl: 3     diclofenac (VOLTAREN) 1 % GEL, Apply 2 g topically 4 times daily Apply 4 grams to knees or 2 grams to hands four times daily using enclosed dosing card., Disp: 100 g, Rfl: 0     hydrocortisone (KP HYDROCORTISONE) 1 % cream, Apply small amount to area as needed, Disp: 15 g, Rfl: 0     lisinopril (PRINIVIL/ZESTRIL) 10 MG tablet, Take 1 tablet (10 mg) by mouth daily, Disp: 90 tablet, Rfl: 1     meloxicam (MOBIC) 7.5 MG tablet, Take 1 tablet (7.5 mg) by mouth every 36 hours Or as needed, not more then 2 times per week, Disp: 30 tablet, Rfl: 1     Multiple Vitamins-Calcium (ONE-A-DAY WITHIN) TABS, Take  by mouth 2 times daily., Disp: , Rfl:      Multiple Vitamins-Minerals (PRESERVISION AREDS 2) CAPS, Take 240 mg by mouth 2 times daily (with meals), Disp: , Rfl:      omeprazole (PRILOSEC) 40 MG capsule, Take 1 capsule (40 mg) by mouth 2 times daily Take 30-60 minutes before a meal., Disp: 180 capsule, Rfl: 3     ORDER FOR DME, One lift-chair for mobilization to use daily., Disp: 1 each, Rfl: PRN     ORDER FOR DME, Equipment being ordered:  [employer] [facility] Walking Lace Shoe~ , Disp: 2 each, Rfl: 0     oxyCODONE IR (ROXICODONE) 5 MG tablet, Take 1 tablet (5 mg) by mouth every 4 hours as needed for pain maximum 2 tablet(s) per day, Disp: 60 tablet, Rfl: 0     [START ON 6/1/2018] oxyCODONE IR (ROXICODONE) 5 MG tablet, Take 1 tablet (5 mg) by mouth every 6 hours as needed for severe pain maximum 2 tablet(s) per day, Disp: 60 tablet, Rfl: 0     [START ON 7/1/2018] oxyCODONE IR (ROXICODONE) 5 MG tablet, Take 1 tablet (5 mg) by mouth  "every 6 hours as needed for severe pain maximum 2 tablet(s) per day, Disp: 60 tablet, Rfl: 0     [DISCONTINUED] cyanocobalamin (VITAMIN B12) 1000 MCG/ML injection, Inject 1 mL (1,000 mcg) into the muscle every 30 days, Disp: 1 mL, Rfl: 11     ALLERGIES:    Allergies   Allergen Reactions     Actonel [Risedronate Sodium] Anaphylaxis     Fentanyl Hives     Azithromycin Palpitations     Celebrex [Celecoxib] Rash     Asa [Aspirin]      Pt cannot use ASA, or salicylates because she has clippingsof arteries in her nose sec to bleeding     Boniva [Ibandronate Sodium]      Jaw pain, nausea , vomiting , teeth pain . Pt has rt hip fracture      Codeine Nausea and Vomiting     Dilaudid Cough [Dilaudid Cough]      headaches     Nsaids      Pt had bypass, duodenal ulcer, esophageal ulcer, cannot take NSAIDS     Tramadol      Nausea , vomiting      Vicodin [Hydrocodone-Acetaminophen]      headaches     Vistaril Other (See Comments)     Headaches and dry heaves       Codeine Nausea and Vomiting and Rash     Hydrocodone Nausea and Vomiting and Rash     Sulfa Drugs Hives and Rash        SOCIAL HISTORY:   Social History     Social History     Marital status:      Spouse name: N/A     Number of children: N/A     Years of education: N/A     Occupational History     Not on file.     Social History Main Topics     Smoking status: Never Smoker     Smokeless tobacco: Never Used     Alcohol use Yes      Comment: rarely     Drug use: No     Sexual activity: No     Other Topics Concern     Parent/Sibling W/ Cabg, Mi Or Angioplasty Before 65f 55m? No     Social History Narrative        FAMILY HISTORY:   Family History   Problem Relation Age of Onset     CANCER Mother      lymphoma     CANCER Brother      esophageal ca        EXAM:Vitals: /84  Ht 4' 8.75\" (1.441 m)  Wt 134 lb 4.8 oz (60.9 kg)  BMI 29.32 kg/m2  BMI= Body mass index is 29.32 kg/(m^2).    General appearance: Patient is alert and fully cooperative with history & " exam.  No sign of distress is noted during the visit.     Psychiatric: Affect is pleasant & appropriate.  Patient appears motivated to improve health.     Respiratory: Breathing is regular & unlabored while sitting.     HEENT: Hearing is intact to spoken word.  Speech is clear.  No gross evidence of visual impairment that would impact ambulation.     Dermatologic: localized hyperkeratotic lesion plantar right 5th metatarsal base and left plantar heel. No open lesions or signs of infection. Medial border of the left great toenail is 40% avulsed. No redness, drainage or signs of infection.      Vascular: DP & PT pulses are intact & regular bilaterally.  No significant edema or varicosities noted.  CFT and skin temperature is normal to both lower extremities.     Neurologic: Lower extremity sensation is intact to light touch.  No evidence of weakness or contracture in the lower extremities.  No evidence of neuropathy.     Musculoskeletal: Patient is ambulatory without assistive device or brace. Decrease arch height. Semi rigid contracture of the toes 2-5.      ASSESSMENT:    Pain in both feet  Skin callus  Pes planus of both feet  Hammer toes of both feet  Traumatic avulsion of nail plate of toe, initial encounter     PLAN:  Reviewed patient's chart in epic. Discussed causes of keratomas.  They are due to areas of increase friction.  Hammertoes can create these as they put more pressure to the metatarsal head.  Discussed treatments such as using foot file, pumice stone, metatarsal pads, orthotics, and not walking barefoot.     Nail does not appear infected. She will continue pumice stone and lotion. Recommend memory foam inserts to the shoes.        Kerrie Alanis DPM, Podiatry/Foot and Ankle Surgery    Weight management plan: Patient was referred to their PCP to discuss a diet and exercise plan.    Again, thank you for allowing me to participate in the care of your patient.        Sincerely,        Kerrie Alanis,  DPM, Podiatry/Foot and Ankle Surgery

## 2018-05-01 NOTE — PATIENT INSTRUCTIONS
Thank you for choosing [facility]!    DR. SALAS'S CLINIC SCHEDULE  MONDAY AM - [place] TUESDAY - [place]   [address] [address] [address] [address] [place] [address]   [phone] / [phone] / [phone]       WEDNESDAY - [place] FRIDAY AM - [facility]   [address]   [place] [address] [address] [place] [address] [address]   [phone] / [phone]       FRIDAY PM - [place] SCHEDULE SURGERY: [phone]   [address] BILLING QUESTIONS: [phone]   [place] [address] [address] AFTER HOURS: [phone]   [phone] / [phone] APPOINTMENTS: [phone]       CALLUS / CORNS / IPKs  When there is excessive friction or pressure on the skin, the body responds by making the skin thicker to protect the deeper structures from becoming exposed. While this works well to protect the deeper structures, the thickened skin can increase pressure and pain.    CALLUS: Flat, diffuse thickening are simple calluses and they are usually caused by friction. Often these are the result of rubbing on a shoe or going barefoot.    CORNS: Calluses with a central core between the toes are called corns. These result from prominent joints on adjacent toes rubbing together. Theses are a symptom of bone malalignment and will always recur unless the underlying bones are addressed surgically.    IPKs: Calluses with a central core on the ball of the foot are usually IPKs (intractable plantar keratosis). These are caused by excessive pressure from the metatarsals, the bones that make up the ball of the foot. Often one of these bones is too long or too prominent.  Again, these will always recur unless the underlying bone issue is addressed. There is no cure for these. They will either go away by themselves, recur, or more could develop.    ROUTINE MAINTENANCE  1. File them down with a pumice stone or callus file a couple  times a week.   2. An electric callus removing device. Amope Pedi Perfect Electronic Pedicure Foot File and Callus Remover can be a good option.   3. Lotion can be applied to soften the callus. A urea based cream such as Kersal or Vanicream or thicker cream with shea butter are good options.  4. Toe spacers or toe covers can be used for corns, gel pads can be used for other lesions on the bottom of the foot.   If there is a surgical pathology noted, such as a prominent bone, often this needs to be addressed surgically to minimize recurrence. However, sometimes the lesion simply migrates to another spot after surgery, so it is not a guaranteed cure.             Body Mass Index (BMI)  Many things can cause foot and ankle problems. Foot structure, activity level, foot mechanics and injuries are common causes of pain.  One very important issue that often goes unmentioned, is body weight. Extra weight can cause increased stress on muscles, ligaments, bones and tendons.  Sometimes just a few extra pounds is all it takes to put one over her/his threshold. Without reducing that stress, it can be difficult to alleviate pain. Some people are uncomfortable addressing this issue, but we feel it is important for you to think about it. As Foot &  Ankle specialists, our job is addressing the lower extremity problem and possible causes. Regarding extra body weight, we encourage patients to discuss diet and weight management plans with their primary care doctors. It is this team approach that gives you the best opportunity for pain relief and getting you back on your feet.

## 2018-05-01 NOTE — NURSING NOTE
"Chief Complaint   Patient presents with     Imm/Inj     B-12 injection      Pain     CHRONIC     Refill Request     Initial /84 (BP Location: Left arm, Patient Position: Chair, Cuff Size: Adult Regular)  Pulse 88  Temp 98.7  F (37.1  C) (Oral)  Resp 16  Ht 4' 8.75\" (1.441 m)  Wt 134 lb 4.8 oz (60.9 kg)  SpO2 100%  BMI 29.32 kg/m2 Estimated body mass index is 29.32 kg/(m^2) as calculated from the following:    Height as of this encounter: 4' 8.75\" (1.441 m).    Weight as of this encounter: 134 lb 4.8 oz (60.9 kg).  BP completed using cuff size regular LEFT arm    Lisa Magill, CMA    "

## 2018-05-01 NOTE — MR AVS SNAPSHOT
After Visit Summary   5/1/2018    Keiko Kimball    MRN: [id number]           Patient Information     Date Of Birth          1950        Visit Information        Provider Department      5/1/2018 10:40 AM Brittnee Sharma MD [facility]        Today's Diagnoses     Closed compression fracture of fifth lumbar vertebra, sequela    -  1    Chronic pain syndrome        Complex regional pain syndrome type 1 of right upper extremity        Fibromyalgia        Chronic nausea           Follow-ups after your visit        Follow-up notes from your care team     Return in about 3 months (around 8/1/2018) for For chronic pain medication check and refills.      Your next 10 appointments already scheduled     May 01, 2018 11:30 AM CDT   New Visit with Kerrie Alanis DPM, Podiatry/Foot and Ankle Surgery   [facility] ([facility]    [address] [facility] [address] [phone]            May 15, 2018 11:30 AM CDT   New Visit with Kerrie Alanis DPM, Podiatry/Foot and Ankle Surgery   [facility] ([facility]    [address] [facility] [address] [phone]              Who to contact     If you have questions or need follow up information about today's clinic visit or your schedule please contact [facility] directly at [phone].  Normal or non-critical lab and imaging results will be communicated to you by MyChart, letter or phone within 4 business days after the clinic has received the results. If you do not hear from us within 7 days, please contact the clinic through MyChart or phone. If you have a critical or abnormal lab result, we will notify you by phone as soon as possible.  Submit refill requests through "[employer]" or call your pharmacy and they will forward the refill request to us. Please allow 3 business days for your refill to be completed.           "Additional Information About Your Visit        MyChart Information     [employer] lets you send messages to your doctor, view your test results, renew your prescriptions, schedule appointments and more. To sign up, go to [email or web address] . Click on \"Log in\" on the left side of the screen, which will take you to the Welcome page. Then click on \"Sign up Now\" on the right side of the page.     You will be asked to enter the access code listed below, as well as some personal information. Please follow the directions to create your username and password.     Your access code is: [email or web address]  Expires: 2018  1:26 PM     Your access code will  in 90 days. If you need help or a new code, please call your [facility] clinic or [phone].        Care EveryWhere ID     This is your Care EveryWhere ID. This could be used by other organizations to access your [facility] medical records  [id number]        Your Vitals Were     Pulse Temperature Respirations Height Pulse Oximetry BMI (Body Mass Index)    88 98.7  F (37.1  C) (Oral) 16 4' 8.75\" (1.441 m) 100% 29.32 kg/m2       Blood Pressure from Last 3 Encounters:   18 128/84   18 134/78   17 130/82    Weight from Last 3 Encounters:   18 134 lb 4.8 oz (60.9 kg)   18 144 lb 11.2 oz (65.6 kg)   17 146 lb (66.2 kg)              Today, you had the following     No orders found for display         Today's Medication Changes          These changes are accurate as of 18 11:01 AM.  If you have any questions, ask your nurse or doctor.               These medicines have changed or have updated prescriptions.        Dose/Directions    cyanocobalamin 1000 MCG/ML injection   Commonly known as:  VITAMIN B12   This may have changed:  Another medication with the same name was removed. Continue taking this medication, and follow the directions you see here.   Used for:  Vitamin B12 deficiency (non anemic)   Changed by:  Brittnee Sharma, " MD        Dose:  1 mL   Inject 1 mL (1,000 mcg) into the muscle every 30 days   Quantity:  1 mL   Refills:  11       * oxyCODONE IR 5 MG tablet   Commonly known as:  ROXICODONE   This may have changed:  Another medication with the same name was changed. Make sure you understand how and when to take each.   Used for:  Chronic pain syndrome   Changed by:  Brittnee Sharma MD        Dose:  5 mg   Take 1 tablet (5 mg) by mouth every 4 hours as needed for pain maximum 2 tablet(s) per day   Quantity:  60 tablet   Refills:  0       * oxyCODONE IR 5 MG tablet   Commonly known as:  ROXICODONE   This may have changed:    - when to take this  - reasons to take this   Used for:  Chronic pain syndrome   Changed by:  Brittnee Sharma MD        Dose:  5 mg   Start taking on:  6/1/2018   Take 1 tablet (5 mg) by mouth every 6 hours as needed for severe pain maximum 2 tablet(s) per day   Quantity:  60 tablet   Refills:  0       * oxyCODONE IR 5 MG tablet   Commonly known as:  ROXICODONE   This may have changed:    - when to take this  - reasons to take this   Used for:  Chronic pain syndrome   Changed by:  Brittnee Sharma MD        Dose:  5 mg   Start taking on:  7/1/2018   Take 1 tablet (5 mg) by mouth every 6 hours as needed for severe pain maximum 2 tablet(s) per day   Quantity:  60 tablet   Refills:  0       * Notice:  This list has 3 medication(s) that are the same as other medications prescribed for you. Read the directions carefully, and ask your doctor or other care provider to review them with you.         Where to get your medicines      Some of these will need a paper prescription and others can be bought over the counter.  Ask your nurse if you have questions.     Bring a paper prescription for each of these medications     oxyCODONE IR 5 MG tablet    oxyCODONE IR 5 MG tablet    oxyCODONE IR 5 MG tablet               Information about OPIOIDS     PRESCRIPTION OPIOIDS: WHAT YOU NEED TO KNOW   You have  a prescription for an opioid (narcotic) pain medicine. Opioids can cause addiction. If you have a history of chemical dependency of any type, you are at a higher risk of becoming addicted to opioids. Only take this medicine after all other options have been tried. Take it for as short a time and as few doses as possible.     Do not:    Drive. If you drive while taking these medicines, you could be arrested for driving under the influence (DUI).    Operate heavy machinery    Do any other dangerous activities while taking these medicines.     Drink any alcohol while taking these medicines.      Take with any other medicines that contain acetaminophen. Read all labels carefully. Look for the word  acetaminophen  or  Tylenol.  Ask your pharmacist if you have questions or are unsure.    Store your pills in a secure place, locked if possible. We will not replace any lost or stolen medicine. If you don t finish your medicine, please throw away (dispose) as directed by your pharmacist. The [place] Pollution Control Agency has more information about safe disposal: https://www.pca.[facility].[place]/living-green/managing-unwanted-medications    All opioids tend to cause constipation. Drink plenty of water and eat foods that have a lot of fiber, such as fruits, vegetables, prune juice, apple juice and high-fiber cereal. Take a laxative (Miralax, milk of magnesia, Colace, Senna) if you don t move your bowels at least every other day.          Primary Care Provider Office Phone # Fax #    Brittnee Sharma [phone]       [id number]   [facility] [address]        Equal Access to Services     [facility]: Hadii aad ku hadasho Soomaali, waaxda luqadaha, qaybta kaalmada adeegyada, marita rubin . So [facility] [phone].    ATENCIÓN: Si habla español, tiene a frank disposición servicios gratuitos de asistencia lingüística. Llame al [phone].    We comply with applicable  federal civil rights laws and [place] laws. We do not discriminate on the basis of race, color, national origin, age, disability, sex, sexual orientation, or gender identity.            Thank you!     Thank you for choosing [facility]  for your care. Our goal is always to provide you with excellent care. Hearing back from our patients is one way we can continue to improve our services. Please take a few minutes to complete the written survey that you may receive in the mail after your visit with us. Thank you!             Your Updated Medication List - Protect others around you: Learn how to safely use, store and throw away your medicines at www.disposemymeds.org.          This list is accurate as of 5/1/18 11:01 AM.  Always use your most recent med list.                   Brand Name Dispense Instructions for use Diagnosis    cholecalciferol 1000 UNIT tablet    vitamin D3    120 tablet    Take 2 tablets (2,000 Units) by mouth 4 times daily    Vitamin D deficiency       cyanocobalamin 1000 MCG/ML injection    VITAMIN B12    1 mL    Inject 1 mL (1,000 mcg) into the muscle every 30 days    Vitamin B12 deficiency (non anemic)       cyclobenzaprine 10 MG tablet    FLEXERIL    180 tablet    Take 1 tablet (10 mg) by mouth nightly as needed    Arthritis       diclofenac 1 % Gel topical gel    VOLTAREN    100 g    Apply 2 g topically 4 times daily Apply 4 grams to knees or 2 grams to hands four times daily using enclosed dosing card.    Osteoarthritis of multiple joints, unspecified osteoarthritis type       hydrocortisone 1 % cream    KP HYDROCORTISONE    15 g    Apply small amount to area as needed    Rash       lisinopril 10 MG tablet    PRINIVIL/ZESTRIL    90 tablet    Take 1 tablet (10 mg) by mouth daily    Essential hypertension, benign       meloxicam 7.5 MG tablet    MOBIC    30 tablet    Take 1 tablet (7.5 mg) by mouth every 36 hours Or as needed, not more then 2 times per week    Arthritis        omeprazole 40 MG capsule    priLOSEC    180 capsule    Take 1 capsule (40 mg) by mouth 2 times daily Take 30-60 minutes before a meal.    Peptic ulcer       ONE-A-DAY WITHIN Tabs      Take  by mouth 2 times daily.        order for DME     1 each    One lift-chair for mobilization to use daily.    Osteoporosis, Arm fracture, right, Elbow fracture, right, Radial nerve palsy       order for DME     2 each    Equipment being ordered:  [facility] [facility]ace [facility]~     Pain in limb, Chronic pain syndrome, Osteoporosis, unspecified       * oxyCODONE IR 5 MG tablet    ROXICODONE    60 tablet    Take 1 tablet (5 mg) by mouth every 4 hours as needed for pain maximum 2 tablet(s) per day    Chronic pain syndrome       * oxyCODONE IR 5 MG tablet   Start taking on:  6/1/2018    ROXICODONE    60 tablet    Take 1 tablet (5 mg) by mouth every 6 hours as needed for severe pain maximum 2 tablet(s) per day    Chronic pain syndrome       * oxyCODONE IR 5 MG tablet   Start taking on:  7/1/2018    ROXICODONE    60 tablet    Take 1 tablet (5 mg) by mouth every 6 hours as needed for severe pain maximum 2 tablet(s) per day    Chronic pain syndrome       PRESERVISION AREDS 2 Caps      Take 240 mg by mouth 2 times daily (with meals)        * Notice:  This list has 3 medication(s) that are the same as other medications prescribed for you. Read the directions carefully, and ask your doctor or other care provider to review them with you.

## 2018-05-01 NOTE — PROGRESS NOTES
"PATIENT HISTORY:  Keiko Kimball is a 67 year old female who presents to clinic for assessment of the left great toe. She notes she banged her toe on a post. Notes the nail partially fell off. Was red. Has been soaking. No pain today. No drainage. Wants to make sure it is okay. Also would like to know what the \"hard lumps\" on her left heel and right foot on the bottom.  No fevers, chills.     Review of Systems:  Patient denies fever, chills, rash, wound, stiffness, limping, numbness, weakness, heart burn, blood in stool, chest pain with activity, calf pain when walking, shortness of breath with activity, chronic cough, easy bleeding/bruising, swelling of ankles, excessive thirst, fatigue, depression, anxiety.  .     PAST MEDICAL HISTORY:   Past Medical History:   Diagnosis Date     Anesthesia complication     cornea scratched left eye     Atypical face pain      Bariatric surgery status 2001    initial surgery staples in 1981, revised in 2001     Better eye: severe vision impairment; lesser eye: blind, 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     Rt side of face     Bisphosphonate-related jaw necrosis (H)      Carpal tunnel syndrome      Carpal tunnel syndrome 1998    Both MCP jts of thumbs removed     Fibromyalgia      Gastro-oesophageal reflux disease      Generalized osteoarthrosis, unspecified site      Macular degeneration ~January 2014    LEFT     Myalgia and myositis, unspecified      Osteoarthritis      Other and unspecified disc disorder of lumbar region      Other osteoporosis      Pain, arm, right     LIMITED MOBILITY OF RIGHT ARM     Peptic ulcer, unspecified site, unspecified as acute or chronic, without mention of hemorrhage or perforation 2001    pt had ulcer found during bariatric surgery     Pseudogout      Reflex sympathetic dystrophy of the upper limb     BILATERAL- HISTORY     Reflex sympathetic dystrophy, unspecified     bilateral     Sciatica     Both lower etm     Seasonal allergic rhinitis      Torn rotator " cuff     LEFT     Trigger finger (acquired)     thumb bilateral     Ulcer of esophagus     As per pt     Unspecified hearing loss     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     Unspecified vitamin D deficiency 5/2007    Pt has low vit d, high PTH, causing osteoporosis, fractures        PAST SURGICAL HISTORY:   Past Surgical History:   Procedure Laterality Date     ARTHROPLASTY ELBOW  3/8/2012    Procedure:ARTHROPLASTY ELBOW; Right Total Elbow Arthroplasty Complex, Right Metal Revision; Surgeon:DONA STEVENSON; [facility]     C APPENDECTOMY       C EXCIS KNEE CARTILAGE,MEDIAL & LAT  1994    Lt knee     C FOOT/TOES SURGERY PROC UNLISTED  1995    Lt foot , tendon repair     C LIGATE ETHMOID ARTERY  1997    developed facial pain syndrome post surgeries     C LIGATE INTERN MAXILL ARTERY  1997     C SHOULDER SURG PROC UNLISTED  8/2006    Rt shoulder replacement surgery     GASTRIC BYPASS  1981, 2001    bypass 25 yrs ago, revised in 2001     HC REVISE MEDIAN N/CARPAL TUNNEL SURG  1999     INTERPOSITION TENDON HAND  6/28/2012    Procedure: INTERPOSITION TENDON HAND;  Right Arm  Radial Nerve Tendon Transfers, Pronator Terres  to Extensor Carpi Radialis Brevis, Palmaris Longus to Extensor Pollicis Longus. Flexor Carpi Ulnaris to Extensor Digitorum Communis  ;  Surgeon: Dona Stevenson MD;  Location:  OR     ORTHOPEDIC SURGERY  2011    shoulder- replacement     RELEASE TRIGGER FINGER  4/23/2013    Procedure: RELEASE TRIGGER FINGER;  Left Index, Middle and Ring Trigger Finger Releases.;  Surgeon: Dona Stevenson MD;  [facility]     ROTATOR CUFF REPAIR RT/LT  2004    rt side     TONSILLECTOMY          MEDICATIONS:   Current Outpatient Prescriptions:      cholecalciferol (VITAMIN D3) 1000 UNIT tablet, Take 2 tablets (2,000 Units) by mouth 4 times daily, Disp: 120 tablet, Rfl: 2     cyanocobalamin (VITAMIN B12) 1000 MCG/ML injection, Inject 1 mL (1,000 mcg) into the muscle every 30 days, Disp: 1 mL, Rfl:  11     cyclobenzaprine (FLEXERIL) 10 MG tablet, Take 1 tablet (10 mg) by mouth nightly as needed, Disp: 180 tablet, Rfl: 3     diclofenac (VOLTAREN) 1 % GEL, Apply 2 g topically 4 times daily Apply 4 grams to knees or 2 grams to hands four times daily using enclosed dosing card., Disp: 100 g, Rfl: 0     hydrocortisone (KP HYDROCORTISONE) 1 % cream, Apply small amount to area as needed, Disp: 15 g, Rfl: 0     lisinopril (PRINIVIL/ZESTRIL) 10 MG tablet, Take 1 tablet (10 mg) by mouth daily, Disp: 90 tablet, Rfl: 1     meloxicam (MOBIC) 7.5 MG tablet, Take 1 tablet (7.5 mg) by mouth every 36 hours Or as needed, not more then 2 times per week, Disp: 30 tablet, Rfl: 1     Multiple Vitamins-Calcium (ONE-A-DAY WITHIN) TABS, Take  by mouth 2 times daily., Disp: , Rfl:      Multiple Vitamins-Minerals (PRESERVISION AREDS 2) CAPS, Take 240 mg by mouth 2 times daily (with meals), Disp: , Rfl:      omeprazole (PRILOSEC) 40 MG capsule, Take 1 capsule (40 mg) by mouth 2 times daily Take 30-60 minutes before a meal., Disp: 180 capsule, Rfl: 3     ORDER FOR DME, One lift-chair for mobilization to use daily., Disp: 1 each, Rfl: PRN     ORDER FOR DME, Equipment being ordered:  [facility] Walking Lace Shoe~ , Disp: 2 each, Rfl: 0     oxyCODONE IR (ROXICODONE) 5 MG tablet, Take 1 tablet (5 mg) by mouth every 4 hours as needed for pain maximum 2 tablet(s) per day, Disp: 60 tablet, Rfl: 0     [START ON 6/1/2018] oxyCODONE IR (ROXICODONE) 5 MG tablet, Take 1 tablet (5 mg) by mouth every 6 hours as needed for severe pain maximum 2 tablet(s) per day, Disp: 60 tablet, Rfl: 0     [START ON 7/1/2018] oxyCODONE IR (ROXICODONE) 5 MG tablet, Take 1 tablet (5 mg) by mouth every 6 hours as needed for severe pain maximum 2 tablet(s) per day, Disp: 60 tablet, Rfl: 0     [DISCONTINUED] cyanocobalamin (VITAMIN B12) 1000 MCG/ML injection, Inject 1 mL (1,000 mcg) into the muscle every 30 days, Disp: 1 mL, Rfl: 11     ALLERGIES:   "  Allergies   Allergen Reactions     Actonel [Risedronate Sodium] Anaphylaxis     Fentanyl Hives     Azithromycin Palpitations     Celebrex [Celecoxib] Rash     Asa [Aspirin]      Pt cannot use ASA, or salicylates because she has clippingsof arteries in her nose sec to bleeding     Boniva [Ibandronate Sodium]      Jaw pain, nausea , vomiting , teeth pain . Pt has rt hip fracture      Codeine Nausea and Vomiting     Dilaudid Cough [Dilaudid Cough]      headaches     Nsaids      Pt had bypass, duodenal ulcer, esophageal ulcer, cannot take NSAIDS     Tramadol      Nausea , vomiting      Vicodin [Hydrocodone-Acetaminophen]      headaches     Vistaril Other (See Comments)     Headaches and dry heaves       Codeine Nausea and Vomiting and Rash     Hydrocodone Nausea and Vomiting and Rash     Sulfa Drugs Hives and Rash        SOCIAL HISTORY:   Social History     Social History     Marital status:      Spouse name: N/A     Number of children: N/A     Years of education: N/A     Occupational History     Not on file.     Social History Main Topics     Smoking status: Never Smoker     Smokeless tobacco: Never Used     Alcohol use Yes      Comment: rarely     Drug use: No     Sexual activity: No     Other Topics Concern     Parent/Sibling W/ Cabg, Mi Or Angioplasty Before 65f 55m? No     Social History Narrative        FAMILY HISTORY:   Family History   Problem Relation Age of Onset     CANCER Mother      lymphoma     CANCER Brother      esophageal ca        EXAM:Vitals: /84  Ht 4' 8.75\" (1.441 m)  Wt 134 lb 4.8 oz (60.9 kg)  BMI 29.32 kg/m2  BMI= Body mass index is 29.32 kg/(m^2).    General appearance: Patient is alert and fully cooperative with history & exam.  No sign of distress is noted during the visit.     Psychiatric: Affect is pleasant & appropriate.  Patient appears motivated to improve health.     Respiratory: Breathing is regular & unlabored while sitting.     HEENT: Hearing is intact to spoken " word.  Speech is clear.  No gross evidence of visual impairment that would impact ambulation.     Dermatologic: localized hyperkeratotic lesion plantar right 5th metatarsal base and left plantar heel. No open lesions or signs of infection. Medial border of the left great toenail is 40% avulsed. No redness, drainage or signs of infection.      Vascular: DP & PT pulses are intact & regular bilaterally.  No significant edema or varicosities noted.  CFT and skin temperature is normal to both lower extremities.     Neurologic: Lower extremity sensation is intact to light touch.  No evidence of weakness or contracture in the lower extremities.  No evidence of neuropathy.     Musculoskeletal: Patient is ambulatory without assistive device or brace. Decrease arch height. Semi rigid contracture of the toes 2-5.      ASSESSMENT:    Pain in both feet  Skin callus  Pes planus of both feet  Hammer toes of both feet  Traumatic avulsion of nail plate of toe, initial encounter     PLAN:  Reviewed patient's chart in epic. Discussed causes of keratomas.  They are due to areas of increase friction.  Hammertoes can create these as they put more pressure to the metatarsal head.  Discussed treatments such as using foot file, pumice stone, metatarsal pads, orthotics, and not walking barefoot.     Nail does not appear infected. She will continue pumice stone and lotion. Recommend memory foam inserts to the shoes.        Kerrie Alanis DPM, Podiatry/Foot and Ankle Surgery    Weight management plan: Patient was referred to their PCP to discuss a diet and exercise plan.

## 2018-05-23 ENCOUNTER — RADIANT APPOINTMENT (OUTPATIENT)
Dept: GENERAL RADIOLOGY | Facility: CLINIC | Age: 68
End: 2018-05-23
Attending: FAMILY MEDICINE
Payer: COMMERCIAL

## 2018-05-23 ENCOUNTER — OFFICE VISIT (OUTPATIENT)
Dept: FAMILY MEDICINE | Facility: CLINIC | Age: 68
End: 2018-05-23
Payer: COMMERCIAL

## 2018-05-23 VITALS
TEMPERATURE: 98.5 F | SYSTOLIC BLOOD PRESSURE: 108 MMHG | OXYGEN SATURATION: 98 % | HEIGHT: 57 IN | DIASTOLIC BLOOD PRESSURE: 84 MMHG | WEIGHT: 135 LBS | HEART RATE: 92 BPM | BODY MASS INDEX: 29.12 KG/M2 | RESPIRATION RATE: 12 BRPM

## 2018-05-23 DIAGNOSIS — R05.9 COUGH: ICD-10-CM

## 2018-05-23 DIAGNOSIS — J18.9 COMMUNITY ACQUIRED PNEUMONIA, UNSPECIFIED LATERALITY: Primary | ICD-10-CM

## 2018-05-23 DIAGNOSIS — R60.0 BILATERAL LOWER EXTREMITY EDEMA: ICD-10-CM

## 2018-05-23 PROCEDURE — 71046 X-RAY EXAM CHEST 2 VIEWS: CPT

## 2018-05-23 PROCEDURE — 99214 OFFICE O/P EST MOD 30 MIN: CPT | Performed by: FAMILY MEDICINE

## 2018-05-23 RX ORDER — DOXYCYCLINE 100 MG/1
100 CAPSULE ORAL 2 TIMES DAILY
Qty: 20 CAPSULE | Refills: 0 | Status: SHIPPED | OUTPATIENT
Start: 2018-05-23 | End: 2018-10-17

## 2018-05-23 NOTE — MR AVS SNAPSHOT
"              After Visit Summary   5/23/2018    Keiko Kimball    MRN: [id number]           Patient Information     Date Of Birth          1950        Visit Information        Provider Department      5/23/2018 9:40 AM Madalyn Villalpando, DO [facility]        Today's Diagnoses     Community acquired pneumonia, unspecified laterality    -  1    Bilateral lower extremity edema           Follow-ups after your visit        Follow-up notes from your care team     Return in about 4 weeks (around 6/20/2018).      Who to contact     If you have questions or need follow up information about today's clinic visit or your schedule please contact [facility] directly at [phone].  Normal or non-critical lab and imaging results will be communicated to you by MyChart, letter or phone within 4 business days after the clinic has received the results. If you do not hear from us within 7 days, please contact the clinic through MyChart or phone. If you have a critical or abnormal lab result, we will notify you by phone as soon as possible.  Submit refill requests through [employer] or call your pharmacy and they will forward the refill request to us. Please allow 3 business days for your refill to be completed.          Additional Information About Your Visit        Care EveryWhere ID     This is your Care EveryWhere ID. This could be used by other organizations to access your [place] medical records  [id number]        Your Vitals Were     Pulse Temperature Respirations Height Pulse Oximetry BMI (Body Mass Index)    92 98.5  F (36.9  C) (Oral) 12 4' 8.75\" (1.441 m) 98% 29.47 kg/m2       Blood Pressure from Last 3 Encounters:   05/23/18 108/84   05/01/18 128/84   05/01/18 128/84    Weight from Last 3 Encounters:   05/23/18 135 lb (61.2 kg)   05/01/18 134 lb 4.8 oz (60.9 kg)   05/01/18 134 lb 4.8 oz (60.9 kg)                 Today's Medication Changes          These changes are accurate as " of 5/23/18 11:21 AM.  If you have any questions, ask your nurse or doctor.               Start taking these medicines.        Dose/Directions    doxycycline 100 MG capsule   Commonly known as:  VIBRAMYCIN   Used for:  Community acquired pneumonia, unspecified laterality   Started by:  Madalyn Villalpando,         Dose:  100 mg   Take 1 capsule (100 mg) by mouth 2 times daily   Quantity:  20 capsule   Refills:  0            Where to get your medicines      These medications were sent to [facility] [address] [place] [address] [id number] AT [place] [address]  [address] [facility] [address]     Phone:  [phone]     doxycycline 100 MG capsule                Primary Care Provider Office Phone # Fax #    Brittnee Sharma [phone]  [address]   [facility] [address]        Equal Access to Services     [facility]: Hadii sheyla ramirez hadasho Soomaali, waaxda luqadaha, qaybta kaalmada adeegyada, waxay donnain haywicho rubin . So [facility] [phone].    ATENCIÓN: Si habla español, tiene a frank disposición servicios gratuitos de asistencia lingüística. Emeka al [phone].    We comply with applicable federal civil rights laws and [place] laws. We do not discriminate on the basis of race, color, national origin, age, disability, sex, sexual orientation, or gender identity.            Thank you!     Thank you for choosing [facility]  for your care. Our goal is always to provide you with excellent care. Hearing back from our patients is one way we can continue to improve our services. Please take a few minutes to complete the written survey that you may receive in the mail after your visit with us. Thank you!             Your Updated Medication List - Protect others around you: Learn how to safely use, store and throw away your medicines at www.disposemymeds.org.          This list is accurate as of 5/23/18 11:21 AM.   Always use your most recent med list.                   Brand Name Dispense Instructions for use Diagnosis    cholecalciferol 1000 UNIT tablet    vitamin D3    120 tablet    Take 2 tablets (2,000 Units) by mouth 4 times daily    Vitamin D deficiency       cyanocobalamin 1000 MCG/ML injection    VITAMIN B12    1 mL    Inject 1 mL (1,000 mcg) into the muscle every 30 days    Vitamin B12 deficiency (non anemic)       cyclobenzaprine 10 MG tablet    FLEXERIL    180 tablet    Take 1 tablet (10 mg) by mouth nightly as needed    Arthritis       diclofenac 1 % Gel topical gel    VOLTAREN    100 g    Apply 2 g topically 4 times daily Apply 4 grams to knees or 2 grams to hands four times daily using enclosed dosing card.    Osteoarthritis of multiple joints, unspecified osteoarthritis type       doxycycline 100 MG capsule    VIBRAMYCIN    20 capsule    Take 1 capsule (100 mg) by mouth 2 times daily    Community acquired pneumonia, unspecified laterality       hydrocortisone 1 % cream    KP HYDROCORTISONE    15 g    Apply small amount to area as needed    Rash       lisinopril 10 MG tablet    PRINIVIL/ZESTRIL    90 tablet    Take 1 tablet (10 mg) by mouth daily    Essential hypertension, benign       meloxicam 7.5 MG tablet    MOBIC    30 tablet    Take 1 tablet (7.5 mg) by mouth every 36 hours Or as needed, not more then 2 times per week    Arthritis       omeprazole 40 MG capsule    priLOSEC    180 capsule    Take 1 capsule (40 mg) by mouth 2 times daily Take 30-60 minutes before a meal.    Peptic ulcer       ONE-A-DAY WITHIN Tabs      Take  by mouth 2 times daily.        order for DME     1 each    One lift-chair for mobilization to use daily.    Osteoporosis, Arm fracture, right, Elbow fracture, right, Radial nerve palsy       order for DME     2 each    Equipment being ordered:  [facility] Walking Lace Shoe~     Pain in limb, Chronic pain syndrome, Osteoporosis, unspecified       * oxyCODONE IR 5 MG  tablet    ROXICODONE    60 tablet    Take 1 tablet (5 mg) by mouth every 4 hours as needed for pain maximum 2 tablet(s) per day    Chronic pain syndrome       * oxyCODONE IR 5 MG tablet   Start taking on:  6/1/2018    ROXICODONE    60 tablet    Take 1 tablet (5 mg) by mouth every 6 hours as needed for severe pain maximum 2 tablet(s) per day    Chronic pain syndrome       * oxyCODONE IR 5 MG tablet   Start taking on:  7/1/2018    ROXICODONE    60 tablet    Take 1 tablet (5 mg) by mouth every 6 hours as needed for severe pain maximum 2 tablet(s) per day    Chronic pain syndrome       PRESERVISION AREDS 2 Caps      Take 240 mg by mouth 2 times daily (with meals)        * Notice:  This list has 3 medication(s) that are the same as other medications prescribed for you. Read the directions carefully, and ask your doctor or other care provider to review them with you.

## 2018-05-23 NOTE — PROGRESS NOTES
SUBJECTIVE:   Keiko Kimball is a 67 year old female who presents to clinic today for the following health issues:      RESPIRATORY SYMPTOMS      Duration: 2 weeks    Description  cough    Severity: mild    Accompanying signs and symptoms: None    History (predisposing factors):  none    Precipitating or alleviating factors: None    Therapies tried and outcome:  none    Patient notes that they were replacing carpets in her apt building before this started.      Patient also notes increased swelling in both feet recently.  She stubbed her left great toe earlier this month and partially avulsed the nail.  She notes increased swelling since then.  Saw podiatry on 5/1 and they were not concerned for an infection.  She denies SOB, orthopnea, dyspnea on exertion.      Problem list and histories reviewed & adjusted, as indicated.  Additional history: as documented    Patient Active Problem List   Diagnosis     Carpal tunnel syndrome     Atypical face pain     Vitamin D deficiency     Osteoporosis     Peptic ulcer     Compression fracture of lumbar vertebra (H)     Reflex sympathetic dystrophy of the arm     Fibromyalgia     Bell's palsy     OA (osteoarthritis) of knee     Seasonal allergic rhinitis     CARDIOVASCULAR SCREENING; LDL GOAL LESS THAN 160     Rotator cuff tear     Pseudogout     Bisphosphonate-related jaw necrosis (H)     Aftercare following joint replacement     Pain in joint, shoulder region     GERD (gastroesophageal reflux disease)     Chronic pain syndrome     Trigger finger, acquired     Arthralgia of both knees     Chronic dislocation of shoulder, right     Vitamin B12 deficiency without anemia     Elevated blood pressure     Other iron deficiency anemia     Other insomnia     Essential hypertension, benign     Intractable vomiting without nausea, vomiting of unspecified type     History of Yoandy-en-Y gastric bypass     Uncomplicated opioid dependence (H)     Complex regional pain syndrome type 1 of  right upper extremity     Hyperparathyroidism (H)     Advanced directives, counseling/discussion     Macular degeneration (senile) of retina     Past Surgical History:   Procedure Laterality Date     ARTHROPLASTY ELBOW  3/8/2012    Procedure:ARTHROPLASTY ELBOW; Right Total Elbow Arthroplasty Complex, Right Metal Revision; Surgeon:DONA STEVENSON; [facility]     C APPENDECTOMY       C EXCIS KNEE CARTILAGE,MEDIAL & LAT  1994    Lt knee     C FOOT/TOES SURGERY PROC UNLISTED  1995    Lt foot , tendon repair     C LIGATE ETHMOID ARTERY  1997    developed facial pain syndrome post surgeries     C LIGATE INTERN MAXILL ARTERY  1997     C SHOULDER SURG PROC UNLISTED  8/2006    Rt shoulder replacement surgery     GASTRIC BYPASS  1981, 2001    bypass 25 yrs ago, revised in 2001     HC REVISE MEDIAN N/CARPAL TUNNEL SURG  1999     INTERPOSITION TENDON HAND  6/28/2012    Procedure: INTERPOSITION TENDON HAND;  Right Arm  Radial Nerve Tendon Transfers, Pronator Terres  to Extensor Carpi Radialis Brevis, Palmaris Longus to Extensor Pollicis Longus. Flexor Carpi Ulnaris to Extensor Digitorum Communis  ;  Surgeon: Dona Stevenson MD;  Location:  OR     ORTHOPEDIC SURGERY  2011    shoulder- replacement     RELEASE TRIGGER FINGER  4/23/2013    Procedure: RELEASE TRIGGER FINGER;  Left Index, Middle and Ring Trigger Finger Releases.;  Surgeon: Dona Stevenson MD;  [facility]     ROTATOR CUFF REPAIR RT/LT  2004    rt side     TONSILLECTOMY         Social History   Substance Use Topics     Smoking status: Never Smoker     Smokeless tobacco: Never Used     Alcohol use Yes      Comment: rarely     Family History   Problem Relation Age of Onset     CANCER Mother      lymphoma     CANCER Brother      esophageal ca           Reviewed and updated as needed this visit by clinical staff       Reviewed and updated as needed this visit by Provider         ROS:  Constitutional, HEENT, cardiovascular, pulmonary, gi  "and gu systems are negative, except as otherwise noted.    OBJECTIVE:     /84 (BP Location: Left arm, Patient Position: Chair, Cuff Size: Adult Regular)  Pulse 92  Temp 98.5  F (36.9  C) (Oral)  Resp 12  Ht 4' 8.75\" (1.441 m)  Wt 135 lb (61.2 kg)  SpO2 98%  BMI 29.47 kg/m2  Body mass index is 29.47 kg/(m^2).  GENERAL: healthy, alert and no distress  EYES: Eyes grossly normal to inspection, PERRL and conjunctivae and sclerae normal  HENT: ear canals and TM's normal, nose and mouth without ulcers or lesions  NECK: no adenopathy  RESP: Faint rhonchi LLL  CV: regular rates and rhythm, normal S1 S2, no S3 or S4, no murmur, click or rub, peripheral pulses strong and 2+ bilateral lower extremity pitting edema to mid-shin    MSK: Left great toe with nail avulsion.  No erythema or drainage.    Diagnostic Test Results:  CXR - no acute process    ASSESSMENT/PLAN:     1. Community acquired pneumonia, unspecified laterality  - CXR with no obvious acute findings, however symptoms are concerning for a possible PNA  - Could also be associated with allergies given that her carpets were taken out prior to the symptoms starting  - Will treat with doxycycline since pt states she gets GI upset from azithro and amox  - XR Chest 2 Views; Future  - doxycycline (VIBRAMYCIN) 100 MG capsule; Take 1 capsule (100 mg) by mouth 2 times daily  Dispense: 20 capsule; Refill: 0    2. Bilateral lower extremity edema  - Most likely from venous insufficiency  - Advised keeping legs elevated  - No signs of infection or DVT    Follow up in 1 month     Madalyn Villalpnado DO  [facility] [place]"

## 2018-05-29 ENCOUNTER — OFFICE VISIT (OUTPATIENT)
Dept: PODIATRY | Facility: CLINIC | Age: 68
End: 2018-05-29
Payer: COMMERCIAL

## 2018-05-29 VITALS
DIASTOLIC BLOOD PRESSURE: 72 MMHG | SYSTOLIC BLOOD PRESSURE: 118 MMHG | WEIGHT: 135 LBS | HEIGHT: 57 IN | BODY MASS INDEX: 29.12 KG/M2

## 2018-05-29 DIAGNOSIS — S91.209D TRAUMATIC AVULSION OF NAIL PLATE OF TOE, SUBSEQUENT ENCOUNTER: Primary | ICD-10-CM

## 2018-05-29 PROCEDURE — 11719 TRIM NAIL(S) ANY NUMBER: CPT | Performed by: PODIATRIST

## 2018-05-29 RX ORDER — THERMOMETER, ELECTRONIC,ORAL
EACH MISCELLANEOUS DAILY
Qty: 30 G | Refills: 2 | Status: SHIPPED | OUTPATIENT
Start: 2018-05-29 | End: 2018-10-17

## 2018-05-29 NOTE — PROGRESS NOTES
"Podiatry / Foot and Ankle Surgery Progress Note    May 29, 2018    Subject: Patient was seen for follow up on left great toenail. Notes that she has been soaking it. No pain to area. Is wondering if it is infected.     Objective:  Vitals: /72  Ht 4' 8.5\" (1.435 m)  Wt 135 lb (61.2 kg)  BMI 29.73 kg/m2  BMI= Body mass index is 29.73 kg/(m^2).  Dermatologic: localized hyperkeratotic lesion plantar right 5th metatarsal base and left plantar heel. No open lesions or signs of infection. Medial border of the left great toenail is 40% avulsed. No redness, drainage or signs of infection.       Vascular: DP & PT pulses are intact & regular bilaterally.  No significant edema or varicosities noted.  CFT and skin temperature is normal to both lower extremities.      Neurologic: Lower extremity sensation is intact to light touch.  No evidence of weakness or contracture in the lower extremities.  No evidence of neuropathy.      Musculoskeletal: Patient is ambulatory without assistive device or brace. Decrease arch height. Semi rigid contracture of the toes 2-5.       ASSESSMENT:Traumatic avulsion of nail plate of toe, subsequent encounter      PLAN:  Reviewed patient's chart in epic. Cut back the loose nail. No signs of infection. Follow up as needed.     Procedure: After verbal consent, the loose area of the left big toenail was debrided back 40% with nail clipper The patient tolerated the procedure and anesthesia well.        Kerrie Alanis DPM, Podiatry/Foot and Ankle Surgery        "

## 2018-05-29 NOTE — LETTER
"    5/29/2018         RE: Keiko Kimball  [address]  [facility] [address]        Dear Colleague,    Thank you for referring your patient, Keiko Kimball, to the [facility]. Please see a copy of my visit note below.    Podiatry / Foot and Ankle Surgery Progress Note    May 29, 2018    Subject: Patient was seen for follow up on left great toenail. Notes that she has been soaking it. No pain to area. Is wondering if it is infected.     Objective:  Vitals: /72  Ht 4' 8.5\" (1.435 m)  Wt 135 lb (61.2 kg)  BMI 29.73 kg/m2  BMI= Body mass index is 29.73 kg/(m^2).  Dermatologic: localized hyperkeratotic lesion plantar right 5th metatarsal base and left plantar heel. No open lesions or signs of infection. Medial border of the left great toenail is 40% avulsed. No redness, drainage or signs of infection.       Vascular: DP & PT pulses are intact & regular bilaterally.  No significant edema or varicosities noted.  CFT and skin temperature is normal to both lower extremities.      Neurologic: Lower extremity sensation is intact to light touch.  No evidence of weakness or contracture in the lower extremities.  No evidence of neuropathy.      Musculoskeletal: Patient is ambulatory without assistive device or brace. Decrease arch height. Semi rigid contracture of the toes 2-5.       ASSESSMENT:Traumatic avulsion of nail plate of toe, subsequent encounter      PLAN:  Reviewed patient's chart in epic. Cut back the loose nail. No signs of infection. Follow up as needed.     Procedure: After verbal consent, the loose area of the left big toenail was debrided back 40% with nail clipper The patient tolerated the procedure and anesthesia well.        Kerrie Alanis DPM, Podiatry/Foot and Ankle Surgery          Again, thank you for allowing me to participate in the care of your patient.        Sincerely,        Kerrie Alanis DPM, Podiatry/Foot and Ankle Surgery    "

## 2018-05-29 NOTE — MR AVS SNAPSHOT
After Visit Summary   5/29/2018    Keiko Kimball    MRN: [id number]           Patient Information     Date Of Birth          1950        Visit Information        Provider Department      5/29/2018 1:00 PM Kerrie Alanis DPM, Podiatry/Foot and Ankle Surgery [facility]        Care Instructions    Thank you for choosing [facility]!    DR. ALANIS'S CLINIC SCHEDULE  MONDAY AM - [place] TUESDAY - [place]   [address] [address] [place] [address] [address] [place] [address]   [phone] / [phone] / [phone]       WEDNESDAY - [place] FRIDAY AM - [facility]   [address]   [place] [address] [place] [address]   [phone] / [phone]       FRIDAY PM - [employer] SCHEDULE SURGERY: [phone]   [address] BILLING QUESTIONS: [phone]   [place] [address] AFTER HOURS: [phone] / [phone] APPOINTMENTS: [phone]     Consumer Price Line (CPL) [phone]       NAIL FUNGUS / ONYCHOMYCOSIS   Nail fungus is not a hygiene problem and will not likely lead to significant medical   problems. The nails may get thick causing pain and possibly local skin infection.   Treatments include debridement (trimming), oral antifungals, topical antifungals and complete removal of the nail. Most fungal nails are not treated.   Topicals such as tea tree oil can be helpful for surface fungus and may, at best, limit   progression. Over the counter creams (such as Lamisil) can also be used however, their effectiveness is also quite low.  Topical treatment with Pen lac is expensive and often not covered by insurance. Pen lac has an approximate 8% success rate. Topical therapy recommendations is to apply twice a day for at least 3-4 months as it takes 9 months for new nail to grow out.    Experts suggest soaking your feet for 15 to 20 minutes  in a mixture of 1 cup vinegar to 4 cups warm water. Be sure to rinse well and pat your feet dry when you're done. You can soak your feet like this daily. But if your skin becomes irritated, try soaking only two to three times a week. Vicks VapoRub, as with vinegar, there have been no controlled clinical trials to assess the effectiveness of Vicks VapoRub on nail fungus, but there have been numerous anecdotal reports that it works. There's no consensus on how often to apply this product, so check with your doctor before using it on your nails.     Oral therapies include Sporanox and Lamisil. Oral therapies are also expensive and not very effective. Side effects such as liver disease are the main concern. Return of fungus is common even if the treatment worked.     Other Tips:  - Penlac nail medication apply daily x 4 months; remove old polish first day of each week  - Antifungal cream/powder (Zeasorb) - apply daily to feet and shoes x 2 months  - Clean shoes with Lysol or in washing machine every few weeks  - Rotate shoe gear; give them 24 hours to dry out between days wearing them  - Clean pair of socks in morning, clean pair in afternoon if your feet sweat  - Shower shoes used in public showers/pools      Body Mass Index (BMI)  Many things can cause foot and ankle problems. Foot structure, activity level, foot mechanics and injuries are common causes of pain.  One very important issue that often goes unmentioned, is body weight. Extra weight can cause increased stress on muscles, ligaments, bones and tendons.  Sometimes just a few extra pounds is all it takes to put one over her/his threshold. Without reducing that stress, it can be difficult to alleviate pain. Some people are uncomfortable addressing this issue, but we feel it is important for you to think about it. As Foot &  Ankle specialists, our job is addressing the lower extremity problem and possible causes. Regarding extra body weight, we encourage  "patients to discuss diet and weight management plans with their primary care doctors. It is this team approach that gives you the best opportunity for pain relief and getting you back on your feet.                  Follow-ups after your visit        Who to contact     If you have questions or need follow up information about today's clinic visit or your schedule please contact [facility] directly at [phone].  Normal or non-critical lab and imaging results will be communicated to you by MyChart, letter or phone within 4 business days after the clinic has received the results. If you do not hear from us within 7 days, please contact the clinic through MyChart or phone. If you have a critical or abnormal lab result, we will notify you by phone as soon as possible.  Submit refill requests through [employer] or call your pharmacy and they will forward the refill request to us. Please allow 3 business days for your refill to be completed.          Additional Information About Your Visit        Care EveryWhere ID     This is your Care EveryWhere ID. This could be used by other organizations to access your [facility] medical records  [id number]        Your Vitals Were     Height BMI (Body Mass Index)                4' 8.5\" (1.435 m) 29.73 kg/m2           Blood Pressure from Last 3 Encounters:   05/29/18 118/72   05/23/18 108/84   05/01/18 128/84    Weight from Last 3 Encounters:   05/29/18 135 lb (61.2 kg)   05/23/18 135 lb (61.2 kg)   05/01/18 134 lb 4.8 oz (60.9 kg)              Today, you had the following     No orders found for display       Primary Care Provider Office Phone # Fax #    Brittnee Sharma [phone]       [address]   [facility] [address]        Equal Access to Services     [facility] [place] AH: Nuria Salamanca, zaire sandhu, butch adams, amrita winn. So [facility] [phone].    ATENCIÓN: Si habla " español, tiene a frank disposición servicios gratuitos de asistencia lingüística. Emeka hughes [phone].    We comply with applicable federal civil rights laws and [place] laws. We do not discriminate on the basis of race, color, national origin, age, disability, sex, sexual orientation, or gender identity.            Thank you!     Thank you for choosing [facility]  for your care. Our goal is always to provide you with excellent care. Hearing back from our patients is one way we can continue to improve our services. Please take a few minutes to complete the written survey that you may receive in the mail after your visit with us. Thank you!             Your Updated Medication List - Protect others around you: Learn how to safely use, store and throw away your medicines at www.disposemymeds.org.          This list is accurate as of 5/29/18  1:11 PM.  Always use your most recent med list.                   Brand Name Dispense Instructions for use Diagnosis    cholecalciferol 1000 UNIT tablet    vitamin D3    120 tablet    Take 2 tablets (2,000 Units) by mouth 4 times daily    Vitamin D deficiency       cyanocobalamin 1000 MCG/ML injection    VITAMIN B12    1 mL    Inject 1 mL (1,000 mcg) into the muscle every 30 days    Vitamin B12 deficiency (non anemic)       cyclobenzaprine 10 MG tablet    FLEXERIL    180 tablet    Take 1 tablet (10 mg) by mouth nightly as needed    Arthritis       diclofenac 1 % Gel topical gel    VOLTAREN    100 g    Apply 2 g topically 4 times daily Apply 4 grams to knees or 2 grams to hands four times daily using enclosed dosing card.    Osteoarthritis of multiple joints, unspecified osteoarthritis type       doxycycline 100 MG capsule    VIBRAMYCIN    20 capsule    Take 1 capsule (100 mg) by mouth 2 times daily    Community acquired pneumonia, unspecified laterality       hydrocortisone 1 % cream    KP HYDROCORTISONE    15 g    Apply small amount to area as needed    Rash        lisinopril 10 MG tablet    PRINIVIL/ZESTRIL    90 tablet    Take 1 tablet (10 mg) by mouth daily    Essential hypertension, benign       meloxicam 7.5 MG tablet    MOBIC    30 tablet    Take 1 tablet (7.5 mg) by mouth every 36 hours Or as needed, not more then 2 times per week    Arthritis       omeprazole 40 MG capsule    priLOSEC    180 capsule    Take 1 capsule (40 mg) by mouth 2 times daily Take 30-60 minutes before a meal.    Peptic ulcer       ONE-A-DAY WITHIN Tabs      Take  by mouth 2 times daily.        order for DME     1 each    One lift-chair for mobilization to use daily.    Osteoporosis, Arm fracture, right, Elbow fracture, right, Radial nerve palsy       order for DME     2 each    Equipment being ordered:  [employer] [facility] Walking Lace Shoe~     Pain in limb, Chronic pain syndrome, Osteoporosis, unspecified       * oxyCODONE IR 5 MG tablet    ROXICODONE    60 tablet    Take 1 tablet (5 mg) by mouth every 4 hours as needed for pain maximum 2 tablet(s) per day    Chronic pain syndrome       * oxyCODONE IR 5 MG tablet   Start taking on:  6/1/2018    ROXICODONE    60 tablet    Take 1 tablet (5 mg) by mouth every 6 hours as needed for severe pain maximum 2 tablet(s) per day    Chronic pain syndrome       * oxyCODONE IR 5 MG tablet   Start taking on:  7/1/2018    ROXICODONE    60 tablet    Take 1 tablet (5 mg) by mouth every 6 hours as needed for severe pain maximum 2 tablet(s) per day    Chronic pain syndrome       PRESERVISION AREDS 2 Caps      Take 240 mg by mouth 2 times daily (with meals)        * Notice:  This list has 3 medication(s) that are the same as other medications prescribed for you. Read the directions carefully, and ask your doctor or other care provider to review them with you.

## 2018-05-29 NOTE — PATIENT INSTRUCTIONS
Thank you for choosing [facility]!    DR. SALAS'S CLINIC SCHEDULE  MONDAY AM - [place] TUESDAY - [place]   [address] [address] [place] [address]   [phone] / [phone]       WEDNESDAY - [place] FRIDAY AM - [facility]OUND C[facility]   [address]   [place] [address] [place] [address]   [phone] / [phone]       FRIDAY PM - [place] SCHEDULE SURGERY: [phone]   [address] BILLING QUESTIONS: [phone]   [place] [address] AFTER HOURS: [phone] APPOINTMENTS: [phone]     Consumer Price Line (CPL) [phone]       NAIL FUNGUS / ONYCHOMYCOSIS   Nail fungus is not a hygiene problem and will not likely lead to significant medical   problems. The nails may get thick causing pain and possibly local skin infection.   Treatments include debridement (trimming), oral antifungals, topical antifungals and complete removal of the nail. Most fungal nails are not treated.   Topicals such as tea tree oil can be helpful for surface fungus and may, at best, limit   progression. Over the counter creams (such as Lamisil) can also be used however, their effectiveness is also quite low.  Topical treatment with Pen lac is expensive and often not covered by insurance. Pen lac has an approximate 8% success rate. Topical therapy recommendations is to apply twice a day for at least 3-4 months as it takes 9 months for new nail to grow out.    Experts suggest soaking your feet for 15 to 20 minutes in a mixture of 1 cup vinegar to 4 cups warm water. Be sure to rinse well and pat your feet dry when you're done. You can soak your feet like this daily. But if your skin becomes irritated, try soaking only two to three times a week. Vicks VapoRub, as with vinegar, there have been no controlled clinical trials to assess the  effectiveness of Vicks VapoRub on nail fungus, but there have been numerous anecdotal reports that it works. There's no consensus on how often to apply this product, so check with your doctor before using it on your nails.     Oral therapies include Sporanox and Lamisil. Oral therapies are also expensive and not very effective. Side effects such as liver disease are the main concern. Return of fungus is common even if the treatment worked.     Other Tips:  - Penlac nail medication apply daily x 4 months; remove old polish first day of each week  - Antifungal cream/powder (Zeasorb) - apply daily to feet and shoes x 2 months  - Clean shoes with Lysol or in washing machine every few weeks  - Rotate shoe gear; give them 24 hours to dry out between days wearing them  - Clean pair of socks in morning, clean pair in afternoon if your feet sweat  - Shower shoes used in public showers/pools      Body Mass Index (BMI)  Many things can cause foot and ankle problems. Foot structure, activity level, foot mechanics and injuries are common causes of pain.  One very important issue that often goes unmentioned, is body weight. Extra weight can cause increased stress on muscles, ligaments, bones and tendons.  Sometimes just a few extra pounds is all it takes to put one over her/his threshold. Without reducing that stress, it can be difficult to alleviate pain. Some people are uncomfortable addressing this issue, but we feel it is important for you to think about it. As Foot &  Ankle specialists, our job is addressing the lower extremity problem and possible causes. Regarding extra body weight, we encourage patients to discuss diet and weight management plans with their primary care doctors. It is this team approach that gives you the best opportunity for pain relief and getting you back on your feet.

## 2018-06-05 ENCOUNTER — ALLIED HEALTH/NURSE VISIT (OUTPATIENT)
Dept: NURSING | Facility: CLINIC | Age: 68
End: 2018-06-05
Payer: COMMERCIAL

## 2018-06-05 DIAGNOSIS — E53.8 VITAMIN B12 DEFICIENCY WITHOUT ANEMIA: Primary | ICD-10-CM

## 2018-06-05 PROCEDURE — 96372 THER/PROPH/DIAG INJ SC/IM: CPT

## 2018-06-05 NOTE — NURSING NOTE
Prior to injection verified patient identity using patient's name and date of birth.  Due to injection administration, patient instructed to remain in clinic for 15 minutes  afterwards, and to report any adverse reaction to me immediately.  Jalyn Sandoval M.A.

## 2018-06-05 NOTE — MR AVS SNAPSHOT
After Visit Summary   6/5/2018    Keiko Kimball    MRN: [id number]           Patient Information     Date Of Birth          1950        Visit Information        Provider Department      6/5/2018 2:00 PM SANTY HUMPHREY/LPN [facility]        Today's Diagnoses     Vitamin B12 deficiency without anemia    -  1       Follow-ups after your visit        Who to contact     If you have questions or need follow up information about today's clinic visit or your schedule please contact [facility] directly at [phone].  Normal or non-critical lab and imaging results will be communicated to you by MyChart, letter or phone within 4 business days after the clinic has received the results. If you do not hear from us within 7 days, please contact the clinic through MyChart or phone. If you have a critical or abnormal lab result, we will notify you by phone as soon as possible.  Submit refill requests through [employer] or call your pharmacy and they will forward the refill request to us. Please allow 3 business days for your refill to be completed.          Additional Information About Your Visit        Care EveryWhere ID     This is your Care EveryWhere ID. This could be used by other organizations to access your [facility] medical records  [id number]         Blood Pressure from Last 3 Encounters:   05/29/18 118/72   05/23/18 108/84   05/01/18 128/84    Weight from Last 3 Encounters:   05/29/18 61.2 kg (135 lb)   05/23/18 61.2 kg (135 lb)   05/01/18 60.9 kg (134 lb 4.8 oz)              We Performed the Following     INJECTION INTRAMUSCULAR OR SUB-Q     VITAMIN B12 INJ /1000MCG        Primary Care Provider Office Phone # Fax #    Brittnee Sharma [phone]       [address]   [facility] [address]        Equal Access to Services     EDWINA PAIGE : Nuria Salamanca, zaire sandhu, butch adams, marita mosher  rae harkinsaajesus ah. So [facility] [phone].    ATENCIÓN: Si shobha morgan, tiene a frank disposición servicios gratuitos de asistencia lingüística. Emeka hughes [phone].    We comply with applicable federal civil rights laws and [place] laws. We do not discriminate on the basis of race, color, national origin, age, disability, sex, sexual orientation, or gender identity.            Thank you!     Thank you for choosing [facility]  for your care. Our goal is always to provide you with excellent care. Hearing back from our patients is one way we can continue to improve our services. Please take a few minutes to complete the written survey that you may receive in the mail after your visit with us. Thank you!             Your Updated Medication List - Protect others around you: Learn how to safely use, store and throw away your medicines at www.disposemymeds.org.          This list is accurate as of 6/5/18  2:19 PM.  Always use your most recent med list.                   Brand Name Dispense Instructions for use Diagnosis    cholecalciferol 1000 UNIT tablet    vitamin D3    120 tablet    Take 2 tablets (2,000 Units) by mouth 4 times daily    Vitamin D deficiency       cyanocobalamin 1000 MCG/ML injection    VITAMIN B12    1 mL    Inject 1 mL (1,000 mcg) into the muscle every 30 days    Vitamin B12 deficiency (non anemic)       cyclobenzaprine 10 MG tablet    FLEXERIL    180 tablet    Take 1 tablet (10 mg) by mouth nightly as needed    Arthritis       diclofenac 1 % Gel topical gel    VOLTAREN    100 g    Apply 2 g topically 4 times daily Apply 4 grams to knees or 2 grams to hands four times daily using enclosed dosing card.    Osteoarthritis of multiple joints, unspecified osteoarthritis type       doxycycline 100 MG capsule    VIBRAMYCIN    20 capsule    Take 1 capsule (100 mg) by mouth 2 times daily    Community acquired pneumonia, unspecified laterality       hydrocortisone 1 % cream    KP  HYDROCORTISONE    15 g    Apply small amount to area as needed    Rash       lisinopril 10 MG tablet    PRINIVIL/ZESTRIL    90 tablet    Take 1 tablet (10 mg) by mouth daily    Essential hypertension, benign       meloxicam 7.5 MG tablet    MOBIC    30 tablet    Take 1 tablet (7.5 mg) by mouth every 36 hours Or as needed, not more then 2 times per week    Arthritis       omeprazole 40 MG capsule    priLOSEC    180 capsule    Take 1 capsule (40 mg) by mouth 2 times daily Take 30-60 minutes before a meal.    Peptic ulcer       ONE-A-DAY WITHIN Tabs      Take  by mouth 2 times daily.        order for DME     1 each    One lift-chair for mobilization to use daily.    Osteoporosis, Arm fracture, right, Elbow fracture, right, Radial nerve palsy       order for DME     2 each    Equipment being ordered:  [employer] Walking Lace Shoe~     Pain in limb, Chronic pain syndrome, Osteoporosis, unspecified       * oxyCODONE IR 5 MG tablet    ROXICODONE    60 tablet    Take 1 tablet (5 mg) by mouth every 4 hours as needed for pain maximum 2 tablet(s) per day    Chronic pain syndrome       * oxyCODONE IR 5 MG tablet    ROXICODONE    60 tablet    Take 1 tablet (5 mg) by mouth every 6 hours as needed for severe pain maximum 2 tablet(s) per day    Chronic pain syndrome       * oxyCODONE IR 5 MG tablet   Start taking on:  7/1/2018    ROXICODONE    60 tablet    Take 1 tablet (5 mg) by mouth every 6 hours as needed for severe pain maximum 2 tablet(s) per day    Chronic pain syndrome       PRESERVISION AREDS 2 Caps      Take 240 mg by mouth 2 times daily (with meals)        tolnaftate 1 % cream    TOLNAFTATE ANTIFUNGAL    30 g    Apply topically daily Apply to left great toe daily as new nail grows out    Traumatic avulsion of nail plate of toe, subsequent encounter       * Notice:  This list has 3 medication(s) that are the same as other medications prescribed for you. Read the directions carefully, and ask your  doctor or other care provider to review them with you.

## 2018-06-22 DIAGNOSIS — I10 ESSENTIAL HYPERTENSION, BENIGN: ICD-10-CM

## 2018-06-22 NOTE — TELEPHONE ENCOUNTER
Requested Prescriptions   Pending Prescriptions Disp Refills     lisinopril (PRINIVIL/ZESTRIL) 10 MG tablet  Last Written Prescription Date:  1/4/18  Last Fill Quantity: 90,  # refills: 1   Last office visit: 5/23/2018 with prescribing provider:  5/23/2018   Future Office Visit:   Next 5 appointments (look out 90 days)     Aug 03, 2018 11:00 AM CDT   [facility] Brittnee [facility]   [facility]    [address] [place] [address], [address] [address] [phone]                90 tablet 1     Sig: Take 1 tablet (10 mg) by mouth daily    There is no refill protocol information for this order

## 2018-06-25 RX ORDER — LISINOPRIL 10 MG/1
10 TABLET ORAL DAILY
Qty: 90 TABLET | Refills: 1 | Status: SHIPPED | OUTPATIENT
Start: 2018-06-25 | End: 2018-11-09

## 2018-06-25 NOTE — TELEPHONE ENCOUNTER
"Requested Prescriptions   Pending Prescriptions Disp Refills     lisinopril (PRINIVIL/ZESTRIL) 10 MG tablet 90 tablet 1     Sig: Take 1 tablet (10 mg) by mouth daily    ACE Inhibitors (Including Combos) Protocol Passed    6/22/2018  4:28 PM       Passed - Blood pressure under 140/90 in past 12 months    BP Readings from Last 3 Encounters:   05/29/18 118/72   05/23/18 108/84   05/01/18 128/84                Passed - Recent (12 mo) or future (30 days) visit within the authorizing provider's specialty    Patient had office visit in the last 12 months or has a visit in the next 30 days with authorizing provider or within the authorizing provider's specialty.  See \"Patient Info\" tab in inbasket, or \"Choose Columns\" in Meds & Orders section of the refill encounter.           Passed - Patient is age 18 or older       Passed - No active pregnancy on record       Passed - Normal serum creatinine on file in past 12 months    Recent Labs   Lab Test  07/03/17   1055   CR  0.60            Passed - Normal serum potassium on file in past 12 months    Recent Labs   Lab Test  07/03/17   1055   POTASSIUM  3.9            Passed - No positive pregnancy test in past 12 months          "

## 2018-08-03 ENCOUNTER — TELEPHONE (OUTPATIENT)
Dept: FAMILY MEDICINE | Facility: CLINIC | Age: 68
End: 2018-08-03

## 2018-08-03 DIAGNOSIS — G89.4 CHRONIC PAIN SYNDROME: ICD-10-CM

## 2018-08-03 NOTE — TELEPHONE ENCOUNTER
"Pt calling very upset that she was called at 4:45 pm last night and had her appt cancelled.  She needs her oxycodone filled.  Writer apologized and asked patient to not yell at her because she is unaware of why appt was cancelled.  Offered to send request to the other providers in clinic to see if they could refill for her, but pt declined because she can't get a ride now.  \"Oh just forget it then!  Goodbye\"    Pt hung up    Pt is also upset that this is the second time this has happened to her.  Offered to have her talk to a supervisor about her concerns but she declined    FYI to PCP  Lisa Montes De Oca RN, BSN    "

## 2018-08-06 RX ORDER — OXYCODONE HYDROCHLORIDE 5 MG/1
5 TABLET ORAL EVERY 6 HOURS PRN
Qty: 60 TABLET | Refills: 0 | Status: CANCELLED | OUTPATIENT
Start: 2018-08-06

## 2018-08-06 NOTE — TELEPHONE ENCOUNTER
I can refill the meds tomorrow, when I am back in the clinic. We can also mail them to her pharmacy

## 2018-08-06 NOTE — TELEPHONE ENCOUNTER
Disp Refills Start End NATHANIEL   oxyCODONE IR (ROXICODONE) 5 MG tablet 60 tablet 0 7/1/2018  --   Sig - Route: Take 1 tablet (5 mg) by mouth every 6 hours as needed for severe pain maximum 2 tablet(s) per day - Oral     Last Written Prescription Date:  7.1.18  Last Fill Quantity: 60,  # refills: 0   Last office visit: 5/23/2018 with prescribing provider:  Kwasi Hanks Office Visit:   Next 5 appointments (look out 90 days)     Aug 07, 2018  9:00 AM CDT   Office Visit with Brittnee Sharma MD   [facility] ([facility]    [address] [place] [address], [address] [address] [phone]                 Requested Prescriptions   Pending Prescriptions Disp Refills     oxyCODONE IR (ROXICODONE) 5 MG tablet 60 tablet 0     Sig: Take 1 tablet (5 mg) by mouth every 6 hours as needed for severe pain maximum 2 tablet(s) per day    There is no refill protocol information for this order        Will add to appt tomorrow  : 8.6.18  CSA on file  No drug screen in past year    Donya Shanks RN, BS  Clinical Nurse Triage.

## 2018-08-07 ENCOUNTER — OFFICE VISIT (OUTPATIENT)
Dept: FAMILY MEDICINE | Facility: CLINIC | Age: 68
End: 2018-08-07
Payer: COMMERCIAL

## 2018-08-07 VITALS
RESPIRATION RATE: 16 BRPM | OXYGEN SATURATION: 99 % | SYSTOLIC BLOOD PRESSURE: 120 MMHG | TEMPERATURE: 97.9 F | BODY MASS INDEX: 30.17 KG/M2 | HEART RATE: 74 BPM | WEIGHT: 137 LBS | DIASTOLIC BLOOD PRESSURE: 70 MMHG

## 2018-08-07 DIAGNOSIS — F11.20 UNCOMPLICATED OPIOID DEPENDENCE (H): ICD-10-CM

## 2018-08-07 DIAGNOSIS — G89.4 CHRONIC PAIN SYNDROME: Primary | ICD-10-CM

## 2018-08-07 DIAGNOSIS — E53.8 VITAMIN B12 DEFICIENCY WITHOUT ANEMIA: ICD-10-CM

## 2018-08-07 DIAGNOSIS — G43.A0 CYCLICAL VOMITING WITHOUT NAUSEA, INTRACTABILITY OF VOMITING NOT SPECIFIED: ICD-10-CM

## 2018-08-07 PROCEDURE — 99000 SPECIMEN HANDLING OFFICE-LAB: CPT | Performed by: FAMILY MEDICINE

## 2018-08-07 PROCEDURE — 99214 OFFICE O/P EST MOD 30 MIN: CPT | Mod: 25 | Performed by: FAMILY MEDICINE

## 2018-08-07 PROCEDURE — 96372 THER/PROPH/DIAG INJ SC/IM: CPT | Performed by: FAMILY MEDICINE

## 2018-08-07 PROCEDURE — 80307 DRUG TEST PRSMV CHEM ANLYZR: CPT | Mod: 90 | Performed by: FAMILY MEDICINE

## 2018-08-07 RX ORDER — OXYCODONE HYDROCHLORIDE 5 MG/1
5 TABLET ORAL EVERY 4 HOURS PRN
Qty: 60 TABLET | Refills: 0 | Status: SHIPPED | OUTPATIENT
Start: 2018-08-07 | End: 2018-10-17

## 2018-08-07 RX ORDER — OXYCODONE HYDROCHLORIDE 5 MG/1
5 TABLET ORAL EVERY 6 HOURS PRN
Qty: 60 TABLET | Refills: 0 | Status: SHIPPED | OUTPATIENT
Start: 2018-09-07 | End: 2018-09-24

## 2018-08-07 NOTE — NURSING NOTE
The following medication was given:     MEDICATION: Vitamin B12  1000 mcg  ROUTE: IM  SITE: Deltoid - Left  DOSE: 1ml  LOT #: [id number]  :  [employer]  EXPIRATION DATE:  02/2019  NDC#: 2280-1948-49  Belen Root MA

## 2018-08-07 NOTE — PROGRESS NOTES
SUBJECTIVE:   Keiko Kimball is a 67 year old female who presents to clinic today for the following health issues:    Chronic Pain Follow-Up       Type / Location of Pain: all over  Analgesia/pain control:       Recent changes:  worse      Overall control: Tolerable with discomfort  Activity level/function:      Daily activities:  Able to do light housework, cooking    Work:  not applicable  Adverse effects:  No  Adherance    Taking medication as directed?  Yes    Participating in other treatments: not applicable  Risk Factors:    Sleep:  Poor    Mood/anxiety:  controlled    Recent family or social stressors:  none noted    Other aggravating factors: cold air makes pain worse(air conditioning)     PHQ-9 SCORE 9/10/2015 10/5/2015 10/2/2017   Total Score 0 8 0     NEETA-7 SCORE 10/5/2015 10/24/2016 10/2/2017   Total Score 0 0 4     Encounter-Level CSA - 07/10/2015:          Controlled Substance Agreement - Scan on 7/15/2015  3:09 PM : [facility] Controlled Substance Agreement 7-10-15 (below)                Amount of exercise or physical activity: twice weekly walks 14 block    Problems taking medications regularly: No    Medication side effects: none    Diet: regular (no restrictions)    Patient inquires about the medical marijuana program, notes that she has been vomiting a lot lately which concerns her. She has difficulty keeping food down. This has improved the past couple of days, she has been eating mostly broth and crackers. Sometimes she can go 3-4 days eating well, then will suddenly vomit every time after she eats. Denies any nausea. Notes that the vomit is mostly bile. She has been seen by GI over a year ago.    Patient also reports that she has been out of oxycodone for the past 7 days, so she has been in a lot of pain. She is requesting a refill. Notes that she does not want to start the medical marijuana program until September because of the oxycodone.     Other problems to add on...  -Seen by   Kwasi 5/23/2018 for pneumonia. States that she is doing much better now, but still feels fatigued.     Problem list and histories reviewed & adjusted, as indicated.  Additional history: as documented    Recent Labs   Lab Test  07/03/17   1055  01/09/17   1041  06/30/16   1132  01/04/16   1108   09/12/14   1046  09/06/13   0836  06/11/12   1035   LDL   --    --   99   --    --   77  85  118   HDL   --    --   57   --    --   69  62  61   TRIG   --    --   116   --    --   77  74  113   ALT   --   17  17  16   < >  17  29   --    CR  0.60  0.56  0.58  0.59   < >  0.60  0.55  0.56   GFRESTIMATED  >90  Non  GFR Calc    >90  Non  GFR Calc    >90  Non  GFR Calc    >90  Non  GFR Calc     < >  >90  Non  GFR Calc    >90  >90   GFRESTBLACK  >90   GFR Calc    >90   GFR Calc    >90   GFR Calc    >90   GFR Calc     < >  >90   GFR Calc    >90  >90   POTASSIUM  3.9  3.8  3.7  4.0   < >  4.0  4.0  4.2   TSH   --    --    --    --    --   3.00   --   3.72    < > = values in this interval not displayed.      BP Readings from Last 3 Encounters:   08/07/18 120/70   05/29/18 118/72   05/23/18 108/84    Wt Readings from Last 3 Encounters:   08/07/18 62.1 kg (137 lb)   05/29/18 61.2 kg (135 lb)   05/23/18 61.2 kg (135 lb)          Labs reviewed in EPIC    Reviewed and updated as needed this visit by clinical staff  Tobacco  Allergies  Meds  Med Hx  Surg Hx  Fam Hx  Soc Hx      Reviewed and updated as needed this visit by Provider         ROS:  Constitutional, HEENT, cardiovascular, pulmonary, gi and gu systems are negative, except as otherwise noted.    This document serves as a record of the services and decisions personally performed and made by Brittnee Sharma MD. It was created on her behalf by Patrica Rice, a trained medical scribe. The creation of this document is  based on the provider's statements to the medical scribe.  Patrica Rice August 7, 2018 9:33 AM     OBJECTIVE:     /70  Pulse 74  Temp 97.9  F (36.6  C) (Oral)  Resp 16  Wt 62.1 kg (137 lb)  SpO2 99%  BMI 30.17 kg/m2  Body mass index is 30.17 kg/(m^2).     GENERAL: healthy, alert and no distress  RESP: lungs clear to auscultation - no rales, rhonchi or wheezes  CV: regular rate and rhythm, normal S1 S2, no S3 or S4, no murmur, click or rub, no peripheral edema and peripheral pulses strong  MS: right shoulder with chronic dislocation,limited mobility with the right arm, is using a brace  SKIN: no suspicious lesions or rashes  NEURO: Normal strength and tone, mentation intact and speech normal  PSYCH: mentation appears normal, affect normal/bright    Diagnostic Test Results:  No results found for this or any previous visit (from the past 24 hour(s)).    ASSESSMENT/PLAN:   (G89.4) Chronic pain syndrome  (primary encounter diagnosis)  Comment: UA today and refilled oxycodone. Discussed that patient does qualify as a candidate for the medical marijuana program, will proceed with the process. Advised that there is an annual processing fee. Discussed that ideally she is either on opioids or medical marijuana, but not both. Will discuss this further in the future   Plan: oxyCODONE IR (ROXICODONE) 5 MG tablet,         oxyCODONE IR (ROXICODONE) 5 MG tablet,         Comprehen Drug Analysis UR          (F11.20) Uncomplicated opioid dependence (H)  Comment: Refilled oxycodone, see above,    (G43.A0) Cyclical vomiting without nausea, intractability of vomiting not specified  Comment: ongoing vomiting. Large work up with GI has been negative.  Will start medical marijuana candidate process per patient request    (E53.8) Vitamin B12 deficiency without anemia  Comment: Administered today  Plan: VITAMIN B12 INJ /1000MCG, INJECTION         INTRAMUSCULAR OR SUB-Q          Follow up in 3 months    The information in this  document, created by the medical scribe for me, accurately reflects the services I personally performed and the decisions made by me. I have reviewed and approved this document for accuracy prior to leaving the patient care area.  August 7, 2018 9:33 AM    Brittnee Sharma MD  [facility]

## 2018-08-07 NOTE — MR AVS SNAPSHOT
After Visit Summary   8/7/2018    Keiko Kimball    MRN: [id number]           Patient Information     Date Of Birth          1950        Visit Information        Provider Department      8/7/2018 9:00 AM Brittnee Sharma MD [facility]        Today's Diagnoses     Chronic pain syndrome    -  1    Uncomplicated opioid dependence (H)        Cyclical vomiting without nausea, intractability of vomiting not specified          Care Instructions    [email or web address]          Follow-ups after your visit        Who to contact     If you have questions or need follow up information about today's clinic visit or your schedule please contact [facility] directly at [phone].  Normal or non-critical lab and imaging results will be communicated to you by MyChart, letter or phone within 4 business days after the clinic has received the results. If you do not hear from us within 7 days, please contact the clinic through MyChart or phone. If you have a critical or abnormal lab result, we will notify you by phone as soon as possible.  Submit refill requests through [employer] or call your pharmacy and they will forward the refill request to us. Please allow 3 business days for your refill to be completed.          Additional Information About Your Visit        Care EveryWhere ID     This is your Care EveryWhere ID. This could be used by other organizations to access your [facility] medical records  [id number]        Your Vitals Were     Pulse Temperature Respirations Pulse Oximetry BMI (Body Mass Index)       74 97.9  F (36.6  C) (Oral) 16 99% 30.17 kg/m2        Blood Pressure from Last 3 Encounters:   08/07/18 120/70   05/29/18 118/72   05/23/18 108/84    Weight from Last 3 Encounters:   08/07/18 137 lb (62.1 kg)   05/29/18 135 lb (61.2 kg)   05/23/18 135 lb (61.2 kg)              We Performed the Following     Comprehen Drug Analysis UR          Today's Medication  Changes          These changes are accurate as of 8/7/18  9:42 AM.  If you have any questions, ask your nurse or doctor.               These medicines have changed or have updated prescriptions.        Dose/Directions    * oxyCODONE IR 5 MG tablet   Commonly known as:  ROXICODONE   This may have changed:  Another medication with the same name was added. Make sure you understand how and when to take each.   Used for:  Chronic pain syndrome   Changed by:  Brittnee Sharma MD        Dose:  5 mg   Take 1 tablet (5 mg) by mouth every 6 hours as needed for severe pain maximum 2 tablet(s) per day   Quantity:  60 tablet   Refills:  0       * oxyCODONE IR 5 MG tablet   Commonly known as:  ROXICODONE   This may have changed:  Another medication with the same name was added. Make sure you understand how and when to take each.   Used for:  Chronic pain syndrome   Changed by:  Brittnee Sharma MD        Dose:  5 mg   Take 1 tablet (5 mg) by mouth every 6 hours as needed for severe pain maximum 2 tablet(s) per day   Quantity:  60 tablet   Refills:  0       * oxyCODONE IR 5 MG tablet   Commonly known as:  ROXICODONE   This may have changed:  Another medication with the same name was added. Make sure you understand how and when to take each.   Used for:  Chronic pain syndrome   Changed by:  Brittnee Sharma MD        Dose:  5 mg   Take 1 tablet (5 mg) by mouth every 4 hours as needed for pain maximum 2 tablet(s) per day   Quantity:  60 tablet   Refills:  0       * oxyCODONE IR 5 MG tablet   Commonly known as:  ROXICODONE   This may have changed:  You were already taking a medication with the same name, and this prescription was added. Make sure you understand how and when to take each.   Used for:  Chronic pain syndrome   Changed by:  Brittnee Sharma MD        Dose:  5 mg   Start taking on:  9/7/2018   Take 1 tablet (5 mg) by mouth every 6 hours as needed for severe pain   Quantity:  60 tablet    Refills:  0       * Notice:  This list has 4 medication(s) that are the same as other medications prescribed for you. Read the directions carefully, and ask your doctor or other care provider to review them with you.         Where to get your medicines      Some of these will need a paper prescription and others can be bought over the counter.  Ask your nurse if you have questions.     Bring a paper prescription for each of these medications     oxyCODONE IR 5 MG tablet    oxyCODONE IR 5 MG tablet               Information about OPIOIDS     PRESCRIPTION OPIOIDS: WHAT YOU NEED TO KNOW   We gave you an opioid (narcotic) pain medicine. It is important to manage your pain, but opioids are not always the best choice. You should first try all the other options your care team gave you. Take this medicine for as short a time (and as few doses) as possible.     These medicines have risks:    DO NOT drive when on new or higher doses of pain medicine. These medicines can affect your alertness and reaction times, and you could be arrested for driving under the influence (DUI). If you need to use opioids long-term, talk to your care team about driving.    DO NOT operate heave machinery    DO NOT do any other dangerous activities while taking these medicines.     DO NOT drink any alcohol while taking these medicines.      If the opioid prescribed includes acetaminophen, DO NOT take with any other medicines that contain acetaminophen. Read all labels carefully. Look for the word  acetaminophen  or  Tylenol.  Ask your pharmacist if you have questions or are unsure.    You can get addicted to pain medicines, especially if you have a history of addiction (chemical, alcohol or substance dependence). Talk to your care team about ways to reduce this risk.    Store your pills in a secure place, locked if possible. We will not replace any lost or stolen medicine. If you don t finish your medicine, please throw away (dispose) as directed  by your pharmacist. The [place] Pollution Control Agency has more information about safe disposal: https://www.pca.[facility].[place].us/living-green/managing-unwanted-medications.     All opioids tend to cause constipation. Drink plenty of water and eat foods that have a lot of fiber, such as fruits, vegetables, prune juice, apple juice and high-fiber cereal. Take a laxative (Miralax, milk of magnesia, Colace, Senna) if you don t move your bowels at least every other day.          Primary Care Provider Office Phone # Fax #    Brittnee Soha Sharma [phone]       [id number]  [address]   [facility] [address]        Equal Access to Services     EDWINA PAIGE : Hadii sheyla jimenezo Sogrant, waaxda luqadaha, qaybta kaalmada adeangieyada, marita winn. So [facility] [phone].    ATENCIÓN: Si habla español, tiene a frank disposición servicios gratuitos de asistencia lingüística. Llame al [phone].    We comply with applicable federal civil rights laws and [place] laws. We do not discriminate on the basis of race, color, national origin, age, disability, sex, sexual orientation, or gender identity.            Thank you!     Thank you for choosing [facility]  for your care. Our goal is always to provide you with excellent care. Hearing back from our patients is one way we can continue to improve our services. Please take a few minutes to complete the written survey that you may receive in the mail after your visit with us. Thank you!             Your Updated Medication List - Protect others around you: Learn how to safely use, store and throw away your medicines at www.disposemymeds.org.          This list is accurate as of 8/7/18  9:42 AM.  Always use your most recent med list.                   Brand Name Dispense Instructions for use Diagnosis    cholecalciferol 1000 UNIT tablet    vitamin D3    120 tablet    Take 2 tablets (2,000 Units) by mouth 4 times daily     Vitamin D deficiency       cyanocobalamin 1000 MCG/ML injection    VITAMIN B12    1 mL    Inject 1 mL (1,000 mcg) into the muscle every 30 days    Vitamin B12 deficiency (non anemic)       cyclobenzaprine 10 MG tablet    FLEXERIL    180 tablet    Take 1 tablet (10 mg) by mouth nightly as needed    Arthritis       diclofenac 1 % Gel topical gel    VOLTAREN    100 g    Apply 2 g topically 4 times daily Apply 4 grams to knees or 2 grams to hands four times daily using enclosed dosing card.    Osteoarthritis of multiple joints, unspecified osteoarthritis type       doxycycline 100 MG capsule    VIBRAMYCIN    20 capsule    Take 1 capsule (100 mg) by mouth 2 times daily    Community acquired pneumonia, unspecified laterality       hydrocortisone 1 % cream    KP HYDROCORTISONE    15 g    Apply small amount to area as needed    Rash       lisinopril 10 MG tablet    PRINIVIL/ZESTRIL    90 tablet    Take 1 tablet (10 mg) by mouth daily    Essential hypertension, benign       meloxicam 7.5 MG tablet    MOBIC    30 tablet    Take 1 tablet (7.5 mg) by mouth every 36 hours Or as needed, not more then 2 times per week    Arthritis       omeprazole 40 MG capsule    priLOSEC    180 capsule    Take 1 capsule (40 mg) by mouth 2 times daily Take 30-60 minutes before a meal.    Peptic ulcer       ONE-A-DAY WITHIN Tabs      Take  by mouth 2 times daily.        order for DME     1 each    One lift-chair for mobilization to use daily.    Osteoporosis, Arm fracture, right, Elbow fracture, right, Radial nerve palsy       order for DME     2 each    Equipment being ordered:  [employer] Walking Lace Shoe~     Pain in limb, Chronic pain syndrome, Osteoporosis, unspecified       * oxyCODONE IR 5 MG tablet    ROXICODONE    60 tablet    Take 1 tablet (5 mg) by mouth every 6 hours as needed for severe pain maximum 2 tablet(s) per day    Chronic pain syndrome       * oxyCODONE IR 5 MG tablet    ROXICODONE    60 tablet    Take 1  tablet (5 mg) by mouth every 6 hours as needed for severe pain maximum 2 tablet(s) per day    Chronic pain syndrome       * oxyCODONE IR 5 MG tablet    ROXICODONE    60 tablet    Take 1 tablet (5 mg) by mouth every 4 hours as needed for pain maximum 2 tablet(s) per day    Chronic pain syndrome       * oxyCODONE IR 5 MG tablet   Start taking on:  9/7/2018    ROXICODONE    60 tablet    Take 1 tablet (5 mg) by mouth every 6 hours as needed for severe pain    Chronic pain syndrome       PRESERVISION AREDS 2 Caps      Take 240 mg by mouth 2 times daily (with meals)        tolnaftate 1 % cream    TOLNAFTATE ANTIFUNGAL    30 g    Apply topically daily Apply to left great toe daily as new nail grows out    Traumatic avulsion of nail plate of toe, subsequent encounter       * Notice:  This list has 4 medication(s) that are the same as other medications prescribed for you. Read the directions carefully, and ask your doctor or other care provider to review them with you.

## 2018-08-07 NOTE — PROGRESS NOTES
"  SUBJECTIVE:   Keiko Kimball is a 67 year old female who presents to clinic today for the following health issues:      Chronic Pain Follow-Up       Type / Location of Pain: all over  Analgesia/pain control:       Recent changes:  worse      Overall control: Tolerable with discomfort  Activity level/function:      Daily activities:  Able to do light housework, cooking    Work:  not applicable  Adverse effects:  No  Adherance    Taking medication as directed?  Yes    Participating in other treatments: not applicable  Risk Factors:    Sleep:  Poor    Mood/anxiety:  controlled    Recent family or social stressors:  none noted    Other aggravating factors: cold air makes pain worse(air conditioning)  PHQ-9 SCORE 9/10/2015 10/5/2015 10/2/2017   Total Score 0 8 0     NEETA-7 SCORE 10/5/2015 10/24/2016 10/2/2017   Total Score 0 0 4     Encounter-Level CSA - 07/10/2015:          Controlled Substance Agreement - Scan on 7/15/2015  3:09 PM : [facility] Controlled Substance Agreement 7-10-15 (below)                Amount of exercise or physical activity: twice weekly walks 14 block    Problems taking medications regularly: No    Medication side effects: none    Diet: regular (no restrictions)        {additional problems for provider to add:493741}    Problem list and histories reviewed & adjusted, as indicated.  Additional history: {NONE - AS DOCUMENTED:202978::\"as documented\"}    {HIST REVIEW/ LINKS 2:447965}    Reviewed and updated as needed this visit by clinical staff  Tobacco  Allergies  Meds  Med Hx  Surg Hx  Fam Hx  Soc Hx      Reviewed and updated as needed this visit by Provider         {PROVIDER CHARTING PREFERENCE:785622}    "

## 2018-08-10 ENCOUNTER — TELEPHONE (OUTPATIENT)
Dept: PSYCHOLOGY | Facility: CLINIC | Age: 68
End: 2018-08-10

## 2018-08-10 DIAGNOSIS — M24.411 CHRONIC DISLOCATION OF RIGHT SHOULDER: Primary | ICD-10-CM

## 2018-08-10 LAB — COMPREHEN DRUG ANALYSIS UR: NORMAL

## 2018-08-10 NOTE — TELEPHONE ENCOUNTER
Pt calling and requesting a referral to a hand specialist.  Pt states she has spoken to you about this issue.      [facility]-Referrals  [phone]

## 2018-08-10 NOTE — TELEPHONE ENCOUNTER
Pt is wanting a cast made for her arm.    [facility]  [address]   [address]  [place], [place]    [phone]    [facility]-Referrals

## 2018-08-20 ENCOUNTER — TELEPHONE (OUTPATIENT)
Dept: ORTHOPEDICS | Facility: CLINIC | Age: 68
End: 2018-08-20

## 2018-08-20 NOTE — TELEPHONE ENCOUNTER
[facility] Call Center    Phone Message    May a detailed message be left on voicemail: yes    Reason for Call: pt trying to make an appt after Sept 11 with Dr Boss and Dr Roger schedule says exhausted after December,  basically can't make an appt after Sept 11. Pt doesn't have transport on Sept 11 and needs to reschedule. Please call her to reschedule when the template is fixed  Action Taken: Message routed to:  [facility] Orthopedics

## 2018-08-24 ENCOUNTER — TELEPHONE (OUTPATIENT)
Dept: ORTHOPEDICS | Facility: CLINIC | Age: 68
End: 2018-08-24

## 2018-08-24 NOTE — TELEPHONE ENCOUNTER
Message left on voicemail to call the clinic concerning an appointment with the hand surgeon to evaluate right elbow pain.  Appointment on hold with Dr Sheth 09/21/18 at 1040.

## 2018-08-24 NOTE — TELEPHONE ENCOUNTER
Patient was informed Dr Roger is leaving her practice at the [facility] the beginning of October.  She states she was referred to Dr Roger by her previous surgeon and does not want to schedule with another hand surgeon.  She requests the appointment on hold 09/21/18 be cancelled.

## 2018-08-24 NOTE — TELEPHONE ENCOUNTER
[facility] Call Center    Phone Message    May a detailed message be left on voicemail: yes    Reason for Call: Other: Pt called back. Will not see any provider except for Dr. Roger. Is working on transportation to see if she can come in a day when Dr. Roger is in clinic. You may take the Dr. Sheth appt off of hold.     Action Taken: Message routed to:  [facility] Orthopedics

## 2018-08-29 ENCOUNTER — THERAPY VISIT (OUTPATIENT)
Dept: OCCUPATIONAL THERAPY | Facility: CLINIC | Age: 68
End: 2018-08-29
Payer: MEDICARE

## 2018-08-29 DIAGNOSIS — M79.601 PAIN OF RIGHT UPPER EXTREMITY: Primary | ICD-10-CM

## 2018-08-29 PROCEDURE — G8986 CARRY D/C STATUS: HCPCS | Mod: GO | Performed by: OCCUPATIONAL THERAPIST

## 2018-08-29 PROCEDURE — G8984 CARRY CURRENT STATUS: HCPCS | Mod: GO | Performed by: OCCUPATIONAL THERAPIST

## 2018-08-29 PROCEDURE — 97760 ORTHOTIC MGMT&TRAING 1ST ENC: CPT | Mod: GO | Performed by: OCCUPATIONAL THERAPIST

## 2018-08-29 PROCEDURE — 97165 OT EVAL LOW COMPLEX 30 MIN: CPT | Mod: GO | Performed by: OCCUPATIONAL THERAPIST

## 2018-08-29 PROCEDURE — G8985 CARRY GOAL STATUS: HCPCS | Mod: GO | Performed by: OCCUPATIONAL THERAPIST

## 2018-08-29 NOTE — LETTER
DEPARTMENT OF HEALTH AND HUMAN SERVICES  CENTERS FOR MEDICARE & MEDICAID SERVICES    PLAN/UPDATED PLAN OF PROGRESS FOR OUTPATIENT REHABILITATION    PATIENTS NAME:  Keiko Kimball   : 1950  [facility] NUMBER:  [id number]  [id number]  PROVIDER NAME: FARRUKH [facility]  MEDICAL RECORD NUMBER: [id number]   START OF CARE DATE:    SOC Date: 18   TYPE:  OT    PRIMARY/TREATMENT DIAGNOSIS: (Pertinent Medical Diagnosis)  Pain of right upper extremity    VISITS FROM START OF CARE:  Rxs Used: 1     Hand Therapy Initial Evaluation  Current Date:  18  Diagnosis:   R arm pain  DOI:  8/10/18 (MD order date)    Subjective:  Keiko Kimball is a 68 year old right hand dominant female.  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 Since onset symptoms are unchanged.  Special tests:  x-ray.  Previous treatment: hand therapy and splinting.   General health as reported by patient is fair.  Pertinent medical history includes: osteoporosis, fibromyalgia.    Medical allergies: See EMR.  Surgical history:none listed  Medication history: see EMR.    Occupational Profile Information:  Current occupation is retired  Prior functional level:  no limitations  Barriers include:transportation, live alone  Mobility: Uses wheelchair  Transportation: unable to use normal mode of transportation, due to current injury  Leisure activities/hobbies: none    Upper Extremity Functional Index Score:  SCORE:   Column Totals: /80: 27   (A lower score indicates greater disability.)    Objective:  Pain:   VAS (0-10) 18   At Rest: 7/10   With Use: 8.5-9/10                 PATIENTS NAME:  Keiko Kimball   : 1950    Location: right lateral elbow  Description: aching and sharp  Radiates: both proximally and distally  What Exacerbates Pain?: elbow/arm movement  Worse (D/N):daytime and  nighttime  Frequency:daily  Relieves: rest    ROM: not assessed, pt reports her right arm is not functional and has no active motion, pt does have full finger AROM    Strength: contraindicated due to lack of ROM    Assessment/Plan:  Patient presents with symptoms consistent with diagnosis of right arm pain, with surgical  intervention.     Rehab Potential:  Good - Return to full activity, some limitations    Patient's limitations or Problem List includes:  Pain and Decreased ROM/motion of the right elbow which interferes with the patient's ability to perform Self Care Tasks (dressing, eating), Household Chores and Driving  as compared to previous level of function.    Patient will benefit from skilled Occupational Therapy to decrease pain to return to previous activity level and resume normal daily tasks and to reach their rehab potential.    Barriers to Learning:  No barrier    Communication Issues:  Patient appears to be able to clearly communicate and understand verbal and written communication and follow directions correctly.    Assessment of Occupational Performance:  5 or more Performance Deficits  Identified Performance Deficits: bathing/showering, toileting, dressing, feeding, functional mobility, hygiene and grooming, home establishment and management, meal preparation and cleanup, shopping, sleep and leisure activities      Clinical Decision Making (Complexity): Low complexity    Treatment Explanation:  The following has been discussed with the patient:  RX ordered/plan of care  Anticipated outcomes  Possible risks and side effects      P: Frequency:  1 x visit  Duration:  NA; 1 x visit, Pt to continue HEP individually.  Pt to return for splint adjustments.  If not return, D/C [facility]    Treatment Plan and HEP:  Orthotic Fabrication:  Long Arm dorsal resting orthosis    Discharge Plan:  Achieve all LTG.  Independent in home treatment program.  Reach maximal therapeutic benefit.        PATIENTS NAME:  Jigar  "Keiko   : 1950      Please refer to the daily flowsheet for treatment today and total treatment time.        Caregiver Signature/Credentials ______________________________ Date ________       Treating Provider: Libertad Machado, BRIANNA, CHT    I have reviewed and certified the need for these services and plan of treatment while under my care.        PHYSICIAN'S SIGNATURE:   ____________________________________ Date___________     Brittnee Sharma    Certification period: Beginning of Cert date period: 18 End of Cert period date: 18     Functional Level Progress Report: Please see attached \"Goal Flow sheet for Functional level.\"    ________ Continue Services or       _____X___ DC Services                Service dates: SOC Date: 18  to present                                                                     "

## 2018-08-31 DIAGNOSIS — K27.9 PEPTIC ULCER: ICD-10-CM

## 2018-08-31 RX ORDER — OMEPRAZOLE 40 MG/1
40 CAPSULE, DELAYED RELEASE ORAL
Qty: 180 CAPSULE | Refills: 0 | Status: SHIPPED | OUTPATIENT
Start: 2018-08-31 | End: 2018-09-17

## 2018-08-31 NOTE — TELEPHONE ENCOUNTER
Prescription approved per [facility] Refill Protocol  Donya Shanks RN BS

## 2018-08-31 NOTE — TELEPHONE ENCOUNTER
Requested Prescriptions   Pending Prescriptions Disp Refills     omeprazole (PRILOSEC) 40 MG capsule  Last Written Prescription Date:  4/6/2017  Last Fill Quantity: 180,  # refills: 3   Last office visit: 8/7/2018 with prescribing provider:  8/7/18   Future Office Visit:   180 capsule 3     Sig: Take 1 capsule (40 mg) by mouth 2 times daily Take 30-60 minutes before a meal.    There is no refill protocol information for this order

## 2018-08-31 NOTE — TELEPHONE ENCOUNTER
"Requested Prescriptions   Pending Prescriptions Disp Refills     omeprazole (PRILOSEC) 40 MG capsule 180 capsule 3     Sig: Take 1 capsule (40 mg) by mouth 2 times daily Take 30-60 minutes before a meal.    PPI Protocol Failed    8/31/2018  7:51 AM       Failed - No diagnosis of osteoporosis on record       Passed - Not on Clopidogrel (unless Pantoprazole ordered)       Passed - Recent (12 mo) or future (30 days) visit within the authorizing provider's specialty    Patient had office visit in the last 12 months or has a visit in the next 30 days with authorizing provider or within the authorizing provider's specialty.  See \"Patient Info\" tab in inbasket, or \"Choose Columns\" in Meds & Orders section of the refill encounter.           Passed - Patient is age 18 or older       Passed - No active pregnacy on record       Passed - No positive pregnancy test in past 12 months          "

## 2018-09-05 PROBLEM — M79.601 PAIN OF RIGHT UPPER EXTREMITY: Status: ACTIVE | Noted: 2018-09-05

## 2018-09-06 ENCOUNTER — ALLIED HEALTH/NURSE VISIT (OUTPATIENT)
Dept: NURSING | Facility: CLINIC | Age: 68
End: 2018-09-06
Payer: COMMERCIAL

## 2018-09-06 DIAGNOSIS — E53.8 VITAMIN B12 DEFICIENCY (NON ANEMIC): Primary | ICD-10-CM

## 2018-09-06 PROCEDURE — 96372 THER/PROPH/DIAG INJ SC/IM: CPT

## 2018-09-07 ENCOUNTER — THERAPY VISIT (OUTPATIENT)
Dept: OCCUPATIONAL THERAPY | Facility: CLINIC | Age: 68
End: 2018-09-07
Payer: MEDICARE

## 2018-09-07 DIAGNOSIS — M79.601 PAIN OF RIGHT UPPER EXTREMITY: ICD-10-CM

## 2018-09-11 DIAGNOSIS — M19.90 ARTHRITIS: ICD-10-CM

## 2018-09-11 NOTE — TELEPHONE ENCOUNTER
"  Requested Prescriptions   Pending Prescriptions Disp Refills     meloxicam (MOBIC) 7.5 MG tablet  Last Written Prescription Date:  1/9/17  Last Fill Quantity: 30,  # refills: 1   Last office visit: 8/7/2018    Future Office Visit:     30 tablet 1     Sig: Take 1 tablet (7.5 mg) by mouth every 36 hours Or as needed, not more then 2 times per week    NSAID Medications Failed    9/11/2018 10:53 AM       Failed - Normal ALT on file in past 12 months    Recent Labs   Lab Test  01/09/17   1041   ALT  17            Failed - Normal AST on file in past 12 months    Recent Labs   Lab Test  01/09/17   1041   AST  16            Failed - Patient is age 6-64 years       Failed - Normal CBC on file in past 12 months    Recent Labs   Lab Test  01/09/17   1041   WBC  4.3   RBC  3.95   HGB  11.5*   HCT  36.2   PLT  366       For [facility] ONLY: QHXF104 = WBC, SIJP844 = RBC         Failed - Normal serum creatinine on file in past 12 months    Recent Labs   Lab Test  07/03/17   1055   CR  0.60            Passed - Blood pressure under 140/90 in past 12 months    BP Readings from Last 3 Encounters:   08/07/18 120/70   05/29/18 118/72   05/23/18 108/84                Passed - Recent (12 mo) or future (30 days) visit within the authorizing provider's specialty    Patient had office visit in the last 12 months or has a visit in the next 30 days with authorizing provider or within the authorizing provider's specialty.  See \"Patient Info\" tab in inbasket, or \"Choose Columns\" in Meds & Orders section of the refill encounter.           Passed - No active pregnancy on record       Passed - No positive pregnancy test in past 12 months        Yang Mcfarland   09/11/18 10:54 AM     "

## 2018-09-12 NOTE — TELEPHONE ENCOUNTER
Routing refill request to provider for review/approval because:  Labs not current:    Lisa Montes De Oca RN, BSN

## 2018-09-12 NOTE — TELEPHONE ENCOUNTER
Pt calling to check status of refill. Would like refill asap so it can be delivered by  pharmacy    Yang Mcfarland   09/12/18 11:06 AM

## 2018-09-17 DIAGNOSIS — K27.9 PEPTIC ULCER: ICD-10-CM

## 2018-09-17 RX ORDER — MELOXICAM 7.5 MG/1
7.5 TABLET ORAL
Qty: 30 TABLET | Refills: 1
Start: 2018-09-17

## 2018-09-17 NOTE — TELEPHONE ENCOUNTER
Call out to pt  Message given  Has been approved for medical marijuana  States will try going without the mobic and she how it goes    Donya Shanks RN, BS  Clinical Nurse Triage.

## 2018-09-17 NOTE — TELEPHONE ENCOUNTER
"omeprazole (PRILOSEC) 40 MG capsule  Last Written Prescription Date:  08/31/18  Last Fill Quantity: 180,  # refills: 3   Last office visit: 8/7/2018 with prescribing provider:  Zachary     Future Office Visit:      Requested Prescriptions   Pending Prescriptions Disp Refills     omeprazole (PRILOSEC) 40 MG capsule 180 capsule 0     Sig: Take 1 capsule (40 mg) by mouth 2 times daily Take 30-60 minutes before a meal.    PPI Protocol Failed    9/17/2018  9:17 AM       Failed - No diagnosis of osteoporosis on record       Passed - Not on Clopidogrel (unless Pantoprazole ordered)       Passed - Recent (12 mo) or future (30 days) visit within the authorizing provider's specialty    Patient had office visit in the last 12 months or has a visit in the next 30 days with authorizing provider or within the authorizing provider's specialty.  See \"Patient Info\" tab in inbasket, or \"Choose Columns\" in Meds & Orders section of the refill encounter.           Passed - Patient is age 18 or older       Passed - No active pregnacy on record       Passed - No positive pregnancy test in past 12 months          "

## 2018-09-17 NOTE — TELEPHONE ENCOUNTER
She should be aware that this medication can increase her risk for having a heart attack or stroke. It can also cause bleeding ulcers, especially in the elderly. If she still wants to take this, despite the risks, she is due for some lab work first.

## 2018-09-18 ENCOUNTER — OFFICE VISIT (OUTPATIENT)
Dept: PODIATRY | Facility: CLINIC | Age: 68
End: 2018-09-18
Payer: COMMERCIAL

## 2018-09-18 VITALS
WEIGHT: 135 LBS | BODY MASS INDEX: 29.12 KG/M2 | DIASTOLIC BLOOD PRESSURE: 82 MMHG | HEIGHT: 57 IN | SYSTOLIC BLOOD PRESSURE: 126 MMHG

## 2018-09-18 DIAGNOSIS — M21.41 PES PLANUS OF BOTH FEET: ICD-10-CM

## 2018-09-18 DIAGNOSIS — L60.3 NONTRAUMATIC NAIL SPLITTING: ICD-10-CM

## 2018-09-18 DIAGNOSIS — M79.672 LEFT FOOT PAIN: Primary | ICD-10-CM

## 2018-09-18 DIAGNOSIS — M21.42 PES PLANUS OF BOTH FEET: ICD-10-CM

## 2018-09-18 DIAGNOSIS — L84 SKIN CALLUS: ICD-10-CM

## 2018-09-18 PROCEDURE — 99213 OFFICE O/P EST LOW 20 MIN: CPT | Performed by: PODIATRIST

## 2018-09-18 RX ORDER — OMEPRAZOLE 40 MG/1
40 CAPSULE, DELAYED RELEASE ORAL
Qty: 180 CAPSULE | Refills: 3 | Status: SHIPPED | OUTPATIENT
Start: 2018-09-18 | End: 2018-11-09

## 2018-09-18 NOTE — MR AVS SNAPSHOT
After Visit Summary   9/18/2018    Keiko Kimball    MRN: [id number]           Patient Information     Date Of Birth          1950        Visit Information        Provider Department      9/18/2018 1:30 PM Kerrie Alanis DPM, Podiatry/Foot and Ankle Surgery [facility]        Today's Diagnoses     Left foot pain    -  1    Nontraumatic nail splitting          Care Instructions    Recommend urea cream to feet. Kersal one brand at the store.       Thank you for choosing [facility] / [facility]!    DR. ALANIS'S CLINIC SCHEDULE  MONDAY AM - [place] TUESDAY - [place]   [address] [address] [address] [place] [address] [place] [address]   [phone] / [phone] / [phone]       WEDNESDAY - [place] FRIDAY AM - [facility]   [address]   [place] [address] [place] [address]   [phone] / [phone]       FRIDAY PM - [place] SCHEDULE SURGERY: [phone]   [address] BILLING QUESTIONS: [phone]   [place] [address] AFTER HOURS: [phone]   [phone] / [phone] APPOINTMENTS: [phone]     Consumer Price Line (CPL) [phone]       CALLUS / CORNS / IPKs  When there is excessive friction or pressure on the skin, the body responds by making the skin thicker to protect the deeper structures from becoming exposed. While this works well to protect the deeper structures, the thickened skin can increase pressure and pain.    CALLUS: Flat, diffuse thickening are simple calluses and they are usually caused by friction. Often these are the result of rubbing on a shoe or going barefoot.    CORNS: Calluses with a central core between the toes are called corns. These result from prominent joints on adjacent toes rubbing together. Theses are a symptom of bone malalignment and will always recur unless the underlying bones are addressed  surgically.    IPKs: Calluses with a central core on the ball of the foot are usually IPKs (intractable plantar keratosis). These are caused by excessive pressure from the metatarsals, the bones that make up the ball of the foot. Often one of these bones is too long or too prominent.  Again, these will always recur unless the underlying bone issue is addressed. There is no cure for these. They will either go away by themselves, recur, or more could develop.    ROUTINE MAINTENANCE  1. File them down with a pumice stone or callus file a couple times a week.   2. An electric callus removing device. Amope Pedi Perfect Electronic Pedicure Foot File and Callus Remover can be a good option.   3. Lotion can be applied to soften the callus. A urea based cream such as Kersal or Vanicream or thicker cream with shea butter are good options.  4. Toe spacers or toe covers can be used for corns, gel pads can be used for other lesions on the bottom of the foot.   If there is a surgical pathology noted, such as a prominent bone, often this needs to be addressed surgically to minimize recurrence. However, sometimes the lesion simply migrates to another spot after surgery, so it is not a guaranteed cure.       NAIL FUNGUS / ONYCHOMYCOSIS   Nail fungus is not a hygiene problem and will not likely lead to significant medical   problems. The nails may get thick causing pain and possibly local skin infection.   Treatments include debridement (trimming), oral antifungals, topical antifungals and complete removal of the nail. Most fungal nails are not treated.   Topicals such as tea tree oil can be helpful for surface fungus and may, at best, limit   progression. Over the counter creams (such as Lamisil) can also be used however, their effectiveness is also quite low.  Topical treatment with Pen lac is expensive and often not covered by insurance. Pen lac has an approximate 8% success rate. Topical therapy recommendations is to apply twice  a day for at least 3-4 months as it takes 9 months for new nail to grow out.    Experts suggest soaking your feet for 15 to 20 minutes in a mixture of 1 cup vinegar to 4 cups warm water. Be sure to rinse well and pat your feet dry when you're done. You can soak your feet like this daily. But if your skin becomes irritated, try soaking only two to three times a week. Vicks VapoRub, as with vinegar, there have been no controlled clinical trials to assess the effectiveness of Vicks VapoRub on nail fungus, but there have been numerous anecdotal reports that it works. There's no consensus on how often to apply this product, so check with your doctor before using it on your nails.     Oral therapies include Sporanox and Lamisil. Oral therapies are also expensive and not very effective. Side effects such as liver disease are the main concern. Return of fungus is common even if the treatment worked.     Other Tips:  - Penlac nail medication apply daily x 4 months; remove old polish first day of each week  - Antifungal cream/powder (Zeasorb) - apply daily to feet and shoes x 2 months  - Clean shoes with Lysol or in washing machine every few weeks  - Rotate shoe gear; give them 24 hours to dry out between days wearing them  - Clean pair of socks in morning, clean pair in afternoon if your feet sweat  - Shower shoes used in public showers/pools      Body Mass Index (BMI)  Many things can cause foot and ankle problems. Foot structure, activity level, foot mechanics and injuries are common causes of pain.  One very important issue that often goes unmentioned, is body weight. Extra weight can cause increased stress on muscles, ligaments, bones and tendons.  Sometimes just a few extra pounds is all it takes to put one over her/his threshold. Without reducing that stress, it can be difficult to alleviate pain. Some people are uncomfortable addressing this issue, but we feel it is important for you to think about it. As Foot &   "Ankle specialists, our job is addressing the lower extremity problem and possible causes. Regarding extra body weight, we encourage patients to discuss diet and weight management plans with their primary care doctors. It is this team approach that gives you the best opportunity for pain relief and getting you back on your feet.                  Follow-ups after your visit        Your next 10 appointments already scheduled     Sep 18, 2018  1:30 PM CDT   PROCEDURE with Kerrie Alanis DPM, Podiatry/Foot and Ankle Surgery   [facility]    [id number] [place] [address] [phone]            Nov 09, 2018 10:20 AM CST   SHORT with Brittnee Sharma MD   [facility]    [address] [place] [address], [address]  [facility] [address] [phone]              Who to contact     If you have questions or need follow up information about today's clinic visit or your schedule please contact [facility] directly at [phone].  Normal or non-critical lab and imaging results will be communicated to you by MyChart, letter or phone within 4 business days after the clinic has received the results. If you do not hear from us within 7 days, please contact the clinic through MyChart or phone. If you have a critical or abnormal lab result, we will notify you by phone as soon as possible.  Submit refill requests through [employer] or call your pharmacy and they will forward the refill request to us. Please allow 3 business days for your refill to be completed.          Additional Information About Your Visit        Care EveryWhere ID     This is your Care EveryWhere ID. This could be used by other organizations to access your [facility] medical records  [id number]        Your Vitals Were     Height BMI (Body Mass Index)                4' 9\" (1.448 m) 29.21 kg/m2           Blood Pressure from Last 3 " Encounters:   09/18/18 126/82   08/07/18 120/70   05/29/18 118/72    Weight from Last 3 Encounters:   09/18/18 135 lb (61.2 kg)   08/07/18 137 lb (62.1 kg)   05/29/18 135 lb (61.2 kg)              Today, you had the following     No orders found for display       Primary Care Provider Office Phone # Fax #    Brittnee Soha Sharma [phone]       [id number]  [address]   [facility] [address]        Equal Access to Services     [facility]: Hadii sheyla ramirez hadasho Sogrant, waaxda luqadaha, qaybta kaalmada adeterrance, marita rubin . So [facility] [phone].    ATENCIÓN: Si habla español, tiene a frank disposición servicios gratuitos de asistencia lingüística. [facility] [phone].    We comply with applicable federal civil rights laws and [place] laws. We do not discriminate on the basis of race, color, national origin, age, disability, sex, sexual orientation, or gender identity.            Thank you!     Thank you for choosing [facility]  for your care. Our goal is always to provide you with excellent care. Hearing back from our patients is one way we can continue to improve our services. Please take a few minutes to complete the written survey that you may receive in the mail after your visit with us. Thank you!             Your Updated Medication List - Protect others around you: Learn how to safely use, store and throw away your medicines at www.disposemymeds.org.          This list is accurate as of 9/18/18  1:14 PM.  Always use your most recent med list.                   Brand Name Dispense Instructions for use Diagnosis    cholecalciferol 1000 UNIT tablet    vitamin D3    120 tablet    Take 2 tablets (2,000 Units) by mouth 4 times daily    Vitamin D deficiency       cyanocobalamin 1000 MCG/ML injection    VITAMIN B12    1 mL    Inject 1 mL (1,000 mcg) into the muscle every 30 days    Vitamin B12 deficiency (non anemic)       cyclobenzaprine 10 MG  tablet    FLEXERIL    180 tablet    Take 1 tablet (10 mg) by mouth nightly as needed    Arthritis       diclofenac 1 % Gel topical gel    VOLTAREN    100 g    Apply 2 g topically 4 times daily Apply 4 grams to knees or 2 grams to hands four times daily using enclosed dosing card.    Osteoarthritis of multiple joints, unspecified osteoarthritis type       doxycycline 100 MG capsule    VIBRAMYCIN    20 capsule    Take 1 capsule (100 mg) by mouth 2 times daily    Community acquired pneumonia, unspecified laterality       hydrocortisone 1 % cream    KP HYDROCORTISONE    15 g    Apply small amount to area as needed    Rash       lisinopril 10 MG tablet    PRINIVIL/ZESTRIL    90 tablet    Take 1 tablet (10 mg) by mouth daily    Essential hypertension, benign       meloxicam 7.5 MG tablet    MOBIC    30 tablet    Take 1 tablet (7.5 mg) by mouth every 36 hours Or as needed, not more then 2 times per week    Arthritis       omeprazole 40 MG capsule    priLOSEC    180 capsule    Take 1 capsule (40 mg) by mouth 2 times daily Take 30-60 minutes before a meal.    Peptic ulcer       ONE-A-DAY WITHIN Tabs      Take  by mouth 2 times daily.        order for DME     1 each    One lift-chair for mobilization to use daily.    Osteoporosis, Arm fracture, right, Elbow fracture, right, Radial nerve palsy       order for DME     2 each    Equipment being ordered:  [employer] Walking Lace Shoe~     Pain in limb, Chronic pain syndrome, Osteoporosis, unspecified       * oxyCODONE IR 5 MG tablet    ROXICODONE    60 tablet    Take 1 tablet (5 mg) by mouth every 6 hours as needed for severe pain maximum 2 tablet(s) per day    Chronic pain syndrome       * oxyCODONE IR 5 MG tablet    ROXICODONE    60 tablet    Take 1 tablet (5 mg) by mouth every 6 hours as needed for severe pain maximum 2 tablet(s) per day    Chronic pain syndrome       * oxyCODONE IR 5 MG tablet    ROXICODONE    60 tablet    Take 1 tablet (5 mg) by mouth  every 4 hours as needed for pain maximum 2 tablet(s) per day    Chronic pain syndrome       * oxyCODONE IR 5 MG tablet    ROXICODONE    60 tablet    Take 1 tablet (5 mg) by mouth every 6 hours as needed for severe pain    Chronic pain syndrome       PRESERVISION AREDS 2 Caps      Take 240 mg by mouth 2 times daily (with meals)        tolnaftate 1 % cream    TOLNAFTATE ANTIFUNGAL    30 g    Apply topically daily Apply to left great toe daily as new nail grows out    Traumatic avulsion of nail plate of toe, subsequent encounter       * Notice:  This list has 4 medication(s) that are the same as other medications prescribed for you. Read the directions carefully, and ask your doctor or other care provider to review them with you.

## 2018-09-18 NOTE — LETTER
"    9/18/2018         RE: Keiko Kimball  [address]  [facility] [address]        Dear Colleague,    Thank you for referring your patient, Keiko Kimball, to the [facility]. Please see a copy of my visit note below.    Podiatry / Foot and Ankle Surgery Progress Note    September 18, 2018    Subject: Patient was seen for left great toe nail falling off. She notes that she is concerned that it is going to get ingrown. No pain to nail. Notes pain to the bottom of the toe when walking. Also notes calluses to the feet and wondering what can be done for it.     Objective:  Vitals: /82  Ht 4' 9\" (1.448 m)  Wt 135 lb (61.2 kg)  BMI 29.21 kg/m2    General:  Patient is alert and orientated.  NAD  Vascular:  DP and PT pulses are palpable.  No varicosities noted  CFT's < 3secs.  Skin temp is normal  Neuro:  Light and gross touch sensation intact to digits, dorsum, and plantar aspects of the feet.  Derm: left great toenail has splint and 50% avulsed from nail bed. Localized hyperkeratotic lesions to left heel and right 5th metatarsal base.   Musculoskeletal:  Decrease arch height.     Assessment:    Left foot pain  Nontraumatic nail splitting  Skin callus  Pes planus of both feet    Plan:  No evidence of ingrown nail. Loose nail was removed. Recommend antifungal cream as new nail grows out and filing the nail so it does not catch. Pain to bottom of toe, not from nail. Likely from arthritis.     Discussed causes of keratomas.  They are due to areas of increase friction.  Hammertoes can create these as they put more pressure to the metatarsal head.  Discussed treatments such as using foot file, pumice stone, metatarsal pads, orthotics, and not walking barefoot.     Recommend pumice stone and urea cream.     Kerrie Alanis DPM, Podiatry/Foot and Ankle Surgery    Weight management plan: Patient was referred to their PCP to discuss a diet and exercise plan.        Again, thank you for " allowing me to participate in the care of your patient.        Sincerely,        Kerrie Alanis DPM, Podiatry/Foot and Ankle Surgery

## 2018-09-18 NOTE — TELEPHONE ENCOUNTER
Prescription approved per [facility].  Shruthi Russo RN

## 2018-09-18 NOTE — PROGRESS NOTES
"Podiatry / Foot and Ankle Surgery Progress Note    September 18, 2018    Subject: Patient was seen for left great toe nail falling off. She notes that she is concerned that it is going to get ingrown. No pain to nail. Notes pain to the bottom of the toe when walking. Also notes calluses to the feet and wondering what can be done for it.     Objective:  Vitals: /82  Ht 4' 9\" (1.448 m)  Wt 135 lb (61.2 kg)  BMI 29.21 kg/m2    General:  Patient is alert and orientated.  NAD  Vascular:  DP and PT pulses are palpable.  No varicosities noted  CFT's < 3secs.  Skin temp is normal  Neuro:  Light and gross touch sensation intact to digits, dorsum, and plantar aspects of the feet.  Derm: left great toenail has splint and 50% avulsed from nail bed. Localized hyperkeratotic lesions to left heel and right 5th metatarsal base.   Musculoskeletal:  Decrease arch height.     Assessment:    Left foot pain  Nontraumatic nail splitting  Skin callus  Pes planus of both feet    Plan:  No evidence of ingrown nail. Loose nail was removed. Recommend antifungal cream as new nail grows out and filing the nail so it does not catch. Pain to bottom of toe, not from nail. Likely from arthritis.     Discussed causes of keratomas.  They are due to areas of increase friction.  Hammertoes can create these as they put more pressure to the metatarsal head.  Discussed treatments such as using foot file, pumice stone, metatarsal pads, orthotics, and not walking barefoot.     Recommend pumice stone and urea cream.     Kerrie Alanis DPM, Podiatry/Foot and Ankle Surgery    Weight management plan: Patient was referred to their PCP to discuss a diet and exercise plan.      "

## 2018-09-18 NOTE — PATIENT INSTRUCTIONS
Recommend urea cream to feet. Kersal one brand at the store.       Thank you for choosing [facility]!    DR. SALAS'S CLINIC SCHEDULE  MONDAY AM - [place] TUESDAY - [place]   [address] [address] [place] [address] [address] [place] [address]   [phone] / [phone] / [phone]       WEDNESDAY - [place] FRIDAY AM - [facility]   [address]   [place] [address] [place] [address]   [phone] / [phone]       FRIDAY PM - [place] SCHEDULE SURGERY: [phone]   [address] [facility] [address] BILLING QUESTIONS: [phone]   [place] [address] AFTER HOURS: [phone]   [phone] / [phone] APPOINTMENTS: [phone]     Consumer Price Line (CPL) [phone]       CALLUS / CORNS / IPKs  When there is excessive friction or pressure on the skin, the body responds by making the skin thicker to protect the deeper structures from becoming exposed. While this works well to protect the deeper structures, the thickened skin can increase pressure and pain.    CALLUS: Flat, diffuse thickening are simple calluses and they are usually caused by friction. Often these are the result of rubbing on a shoe or going barefoot.    CORNS: Calluses with a central core between the toes are called corns. These result from prominent joints on adjacent toes rubbing together. Theses are a symptom of bone malalignment and will always recur unless the underlying bones are addressed surgically.    IPKs: Calluses with a central core on the ball of the foot are usually IPKs (intractable plantar keratosis). These are caused by excessive pressure from the metatarsals, the bones that make up the ball of the foot. Often one of these bones is too long or too prominent.  Again, these will always recur unless the underlying bone issue is addressed. There is no cure for these. They will either go away by themselves, recur,  or more could develop.    ROUTINE MAINTENANCE  1. File them down with a pumice stone or callus file a couple times a week.   2. An electric callus removing device. Amope Pedi Perfect Electronic Pedicure Foot File and Callus Remover can be a good option.   3. Lotion can be applied to soften the callus. A urea based cream such as Kersal or Vanicream or thicker cream with shea butter are good options.  4. Toe spacers or toe covers can be used for corns, gel pads can be used for other lesions on the bottom of the foot.   If there is a surgical pathology noted, such as a prominent bone, often this needs to be addressed surgically to minimize recurrence. However, sometimes the lesion simply migrates to another spot after surgery, so it is not a guaranteed cure.       NAIL FUNGUS / ONYCHOMYCOSIS   Nail fungus is not a hygiene problem and will not likely lead to significant medical   problems. The nails may get thick causing pain and possibly local skin infection.   Treatments include debridement (trimming), oral antifungals, topical antifungals and complete removal of the nail. Most fungal nails are not treated.   Topicals such as tea tree oil can be helpful for surface fungus and may, at best, limit   progression. Over the counter creams (such as Lamisil) can also be used however, their effectiveness is also quite low.  Topical treatment with Pen lac is expensive and often not covered by insurance. Pen lac has an approximate 8% success rate. Topical therapy recommendations is to apply twice a day for at least 3-4 months as it takes 9 months for new nail to grow out.    Experts suggest soaking your feet for 15 to 20 minutes in a mixture of 1 cup vinegar to 4 cups warm water. Be sure to rinse well and pat your feet dry when you're done. You can soak your feet like this daily. But if your skin becomes irritated, try soaking only two to three times a week. Vicks VapoRub, as with vinegar, there have been no controlled clinical  trials to assess the effectiveness of Vicks VapoRub on nail fungus, but there have been numerous anecdotal reports that it works. There's no consensus on how often to apply this product, so check with your doctor before using it on your nails.     Oral therapies include Sporanox and Lamisil. Oral therapies are also expensive and not very effective. Side effects such as liver disease are the main concern. Return of fungus is common even if the treatment worked.     Other Tips:  - Penlac nail medication apply daily x 4 months; remove old polish first day of each week  - Antifungal cream/powder (Zeasorb) - apply daily to feet and shoes x 2 months  - Clean shoes with Lysol or in washing machine every few weeks  - Rotate shoe gear; give them 24 hours to dry out between days wearing them  - Clean pair of socks in morning, clean pair in afternoon if your feet sweat  - Shower shoes used in public showers/pools      Body Mass Index (BMI)  Many things can cause foot and ankle problems. Foot structure, activity level, foot mechanics and injuries are common causes of pain.  One very important issue that often goes unmentioned, is body weight. Extra weight can cause increased stress on muscles, ligaments, bones and tendons.  Sometimes just a few extra pounds is all it takes to put one over her/his threshold. Without reducing that stress, it can be difficult to alleviate pain. Some people are uncomfortable addressing this issue, but we feel it is important for you to think about it. As Foot &  Ankle specialists, our job is addressing the lower extremity problem and possible causes. Regarding extra body weight, we encourage patients to discuss diet and weight management plans with their primary care doctors. It is this team approach that gives you the best opportunity for pain relief and getting you back on your feet.

## 2018-09-20 DIAGNOSIS — G89.4 CHRONIC PAIN SYNDROME: ICD-10-CM

## 2018-09-20 NOTE — TELEPHONE ENCOUNTER
This refill is for Oct  Controlled Substance Refill Request for Oxy  Problem List Complete:  Yes    Last Written Prescription Date:  9/7/2018  Last Fill Quantity: 60,   # refills: 0    Last Office Visit with [facility] primary care provider: 8/7/2018      Future Office visit:   Next 5 appointments (look out 90 days)     Nov 09, 2018 10:20 AM CST   [facility]    [address] [place] [address], [address]  [facility] [address] [phone]                   Please mail to home address    Annita Feliciano/

## 2018-09-21 RX ORDER — OXYCODONE HYDROCHLORIDE 5 MG/1
5 TABLET ORAL EVERY 4 HOURS PRN
Qty: 60 TABLET | Refills: 0 | Status: CANCELLED | OUTPATIENT
Start: 2018-09-21

## 2018-09-21 NOTE — TELEPHONE ENCOUNTER
Each rx should last 1 month and her sept refill was the 7th. She is due for appt in Oct for the next 3 refills, please offer appt, I cannot refill this med at this time

## 2018-09-21 NOTE — TELEPHONE ENCOUNTER
Spoke with patient, has appt scheduled for November 9th. Is asking for Rx to be mailed for October fill. Is just asking in advance so that she receives it by mail before Octobers refill time. She understands that she cannot fill early.  Belen Root MA

## 2018-09-21 NOTE — TELEPHONE ENCOUNTER
Refill request a little early when reviewing start dates. Is each script to last 1 month?    RN unable to check , I do not have access to providers account.     Heather Rubio RN -- [facility]

## 2018-09-24 RX ORDER — OXYCODONE HYDROCHLORIDE 5 MG/1
5 TABLET ORAL EVERY 6 HOURS PRN
Qty: 60 TABLET | Refills: 0 | Status: SHIPPED | OUTPATIENT
Start: 2018-09-24 | End: 2018-10-17

## 2018-09-24 NOTE — TELEPHONE ENCOUNTER
Spoke with pt, Rx mailed to [facility] [address] per her request     Yang Mcfarland   09/24/18 11:53 AM

## 2018-10-05 ENCOUNTER — THERAPY VISIT (OUTPATIENT)
Dept: OCCUPATIONAL THERAPY | Facility: CLINIC | Age: 68
End: 2018-10-05
Payer: MEDICARE

## 2018-10-05 DIAGNOSIS — M79.601 PAIN OF RIGHT UPPER EXTREMITY: ICD-10-CM

## 2018-10-17 ENCOUNTER — TELEPHONE (OUTPATIENT)
Dept: FAMILY MEDICINE | Facility: CLINIC | Age: 68
End: 2018-10-17

## 2018-10-17 NOTE — LETTER
[address]  2018      [place] [address]           Phone :  [phone]          Fax:  [phone]  Keiko Kimball  [address] [address]      To Whom It May Concern:    RE:  Keiko Kimball  ( 1950)    PROBLEM LIST:    Patient Active Problem List   Diagnosis     Carpal tunnel syndrome     Atypical face pain     Vitamin D deficiency     Osteoporosis     Peptic ulcer     Compression fracture of lumbar vertebra (H)     Reflex sympathetic dystrophy of the arm     Fibromyalgia     Bell's palsy     OA (osteoarthritis) of knee     Seasonal allergic rhinitis     CARDIOVASCULAR SCREENING; LDL GOAL LESS THAN 160     Rotator cuff tear     Pseudogout     Bisphosphonate-related jaw necrosis (H)     Aftercare following joint replacement     Pain in joint, shoulder region     GERD (gastroesophageal reflux disease)     Chronic pain syndrome     Trigger finger, acquired     Arthralgia of both knees     Chronic dislocation of shoulder, right     Vitamin B12 deficiency without anemia     Elevated blood pressure     Other iron deficiency anemia     Other insomnia     Essential hypertension, benign     Intractable vomiting without nausea, vomiting of unspecified type     History of Yoandy-en-Y gastric bypass     Uncomplicated opioid dependence (H)     Complex regional pain syndrome type 1 of right upper extremity     Hyperparathyroidism (H)     Advanced directives, counseling/discussion     Macular degeneration (senile) of retina     Pain of right upper extremity     Sincerely,        Brittnee Sharma MD

## 2018-11-09 ENCOUNTER — RADIANT APPOINTMENT (OUTPATIENT)
Dept: GENERAL RADIOLOGY | Facility: CLINIC | Age: 68
End: 2018-11-09
Attending: FAMILY MEDICINE
Payer: COMMERCIAL

## 2018-11-09 ENCOUNTER — OFFICE VISIT (OUTPATIENT)
Dept: FAMILY MEDICINE | Facility: CLINIC | Age: 68
End: 2018-11-09
Payer: COMMERCIAL

## 2018-11-09 VITALS
SYSTOLIC BLOOD PRESSURE: 122 MMHG | HEART RATE: 74 BPM | BODY MASS INDEX: 29.26 KG/M2 | TEMPERATURE: 98.9 F | WEIGHT: 135.2 LBS | OXYGEN SATURATION: 99 % | DIASTOLIC BLOOD PRESSURE: 74 MMHG | RESPIRATION RATE: 16 BRPM

## 2018-11-09 DIAGNOSIS — E55.9 VITAMIN D DEFICIENCY: ICD-10-CM

## 2018-11-09 DIAGNOSIS — R05.9 COUGH: ICD-10-CM

## 2018-11-09 DIAGNOSIS — G89.4 CHRONIC PAIN SYNDROME: ICD-10-CM

## 2018-11-09 DIAGNOSIS — Z12.11 SPECIAL SCREENING FOR MALIGNANT NEOPLASMS, COLON: ICD-10-CM

## 2018-11-09 DIAGNOSIS — K27.9 PEPTIC ULCER: ICD-10-CM

## 2018-11-09 DIAGNOSIS — E53.8 VITAMIN B12 DEFICIENCY (NON ANEMIC): Primary | ICD-10-CM

## 2018-11-09 DIAGNOSIS — I10 ESSENTIAL HYPERTENSION, BENIGN: ICD-10-CM

## 2018-11-09 LAB — VIT B12 SERPL-MCNC: 692 PG/ML (ref 193–986)

## 2018-11-09 PROCEDURE — 80053 COMPREHEN METABOLIC PANEL: CPT | Performed by: FAMILY MEDICINE

## 2018-11-09 PROCEDURE — 82306 VITAMIN D 25 HYDROXY: CPT | Performed by: FAMILY MEDICINE

## 2018-11-09 PROCEDURE — 36415 COLL VENOUS BLD VENIPUNCTURE: CPT | Performed by: FAMILY MEDICINE

## 2018-11-09 PROCEDURE — 99214 OFFICE O/P EST MOD 30 MIN: CPT | Performed by: FAMILY MEDICINE

## 2018-11-09 PROCEDURE — 84443 ASSAY THYROID STIM HORMONE: CPT | Performed by: FAMILY MEDICINE

## 2018-11-09 PROCEDURE — 82607 VITAMIN B-12: CPT | Performed by: FAMILY MEDICINE

## 2018-11-09 PROCEDURE — 71046 X-RAY EXAM CHEST 2 VIEWS: CPT | Mod: FY

## 2018-11-09 RX ORDER — OMEPRAZOLE 40 MG/1
40 CAPSULE, DELAYED RELEASE ORAL
Qty: 180 CAPSULE | Refills: 3 | Status: SHIPPED | OUTPATIENT
Start: 2018-11-09 | End: 2019-04-17

## 2018-11-09 RX ORDER — CYANOCOBALAMIN 1000 UG/ML
1 INJECTION, SOLUTION INTRAMUSCULAR; SUBCUTANEOUS
Qty: 1 ML | Refills: 11 | OUTPATIENT
Start: 2018-11-09 | End: 2022-04-05

## 2018-11-09 RX ORDER — OXYCODONE HYDROCHLORIDE 5 MG/1
5 TABLET ORAL EVERY 6 HOURS PRN
Qty: 60 TABLET | Refills: 0 | Status: SHIPPED | OUTPATIENT
Start: 2018-11-09 | End: 2019-04-17

## 2018-11-09 RX ORDER — LISINOPRIL 10 MG/1
10 TABLET ORAL DAILY
Qty: 90 TABLET | Refills: 1 | Status: SHIPPED | OUTPATIENT
Start: 2018-11-09 | End: 2019-04-17

## 2018-11-09 ASSESSMENT — ANXIETY QUESTIONNAIRES
5. BEING SO RESTLESS THAT IT IS HARD TO SIT STILL: NOT AT ALL
7. FEELING AFRAID AS IF SOMETHING AWFUL MIGHT HAPPEN: NOT AT ALL
1. FEELING NERVOUS, ANXIOUS, OR ON EDGE: MORE THAN HALF THE DAYS
3. WORRYING TOO MUCH ABOUT DIFFERENT THINGS: MORE THAN HALF THE DAYS
6. BECOMING EASILY ANNOYED OR IRRITABLE: MORE THAN HALF THE DAYS
GAD7 TOTAL SCORE: 10
2. NOT BEING ABLE TO STOP OR CONTROL WORRYING: MORE THAN HALF THE DAYS

## 2018-11-09 ASSESSMENT — PATIENT HEALTH QUESTIONNAIRE - PHQ9: 5. POOR APPETITE OR OVEREATING: MORE THAN HALF THE DAYS

## 2018-11-09 NOTE — NURSING NOTE
"Chief Complaint   Patient presents with     Pain     Blood Draw     LABS.  Patient is NOT fasting     Cough     Imm/Inj     B-12     Initial /74 (BP Location: Left arm, Patient Position: Sitting, Cuff Size: Adult Regular)  Pulse 74  Temp 98.9  F (37.2  C) (Oral)  Resp 16  Wt 135 lb 3.2 oz (61.3 kg)  SpO2 99%  BMI 29.26 kg/m2 Estimated body mass index is 29.26 kg/(m^2) as calculated from the following:    Height as of 9/18/18: 4' 9\" (1.448 m).    Weight as of this encounter: 135 lb 3.2 oz (61.3 kg).  BP completed using cuff size regular left arm    Lisa Magill, CMA    "
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
## 2018-11-09 NOTE — MR AVS SNAPSHOT
After Visit Summary   11/9/2018    Keiko Kimball    MRN: [id number]           Patient Information     Date Of Birth          1950        Visit Information        Provider Department      11/9/2018 10:20 AM Brittnee Sharma MD [facility]        Today's Diagnoses     Vitamin B12 deficiency (non anemic)    -  1    Essential hypertension, benign        Peptic ulcer        Chronic pain syndrome        Cough        Special screening for malignant neoplasms, colon        Vitamin D deficiency           Follow-ups after your visit        Follow-up notes from your care team     Return in about 3 months (around 2/9/2019) for med check.      Your next 10 appointments already scheduled     Nov 09, 2018 10:50 AM CST   XR CHEST 2 VIEWS with FMXR1   [facility] ([facility])    [address], [place], [address]  [facility] [address] [phone]           How do I prepare for my exam? (Food and drink instructions) No Food and Drink Restrictions.  How do I prepare for my exam? (Other instructions) You do not need to do anything special for this exam.  What should I wear: Wear comfortable clothes.  How long does the exam take: Most scans take less than 5 minutes.  What should I bring: Bring a list of your medicines, including vitamins, minerals and over-the-counter drugs. It is safest to leave personal items at home.  Do I need a :  No  is needed.  What do I need to tell my doctor: Tell your doctor if there s any chance you are pregnant.  What should I do after the exam: No restrictions, You may resume normal activities.  What is this test: An image of a specific body part shown in shades of black and white.  Who should I call with questions: If you have any questions, please call the Imaging Department where you will have your exam. Directions, parking instructions, and other information is available on our website, [email or web address].             Nov 16, 2018  9:00 AM CST   SHORT with Brittnee Sharma MD   [facility]    [address], [address]  [facility] [address] [phone]              Future tests that were ordered for you today     Open Future Orders        Priority Expected Expires Ordered    Lipid panel reflex to direct LDL Non-fasting Routine 11/9/2018 11/23/2018 11/9/2018    Fecal colorectal cancer screen FIT Routine 11/30/2018 2/1/2019 11/9/2018            Who to contact     If you have questions or need follow up information about today's clinic visit or your schedule please contact [facility] directly at [phone].  Normal or non-critical lab and imaging results will be communicated to you by MyChart, letter or phone within 4 business days after the clinic has received the results. If you do not hear from us within 7 days, please contact the clinic through MyChart or phone. If you have a critical or abnormal lab result, we will notify you by phone as soon as possible.  Submit refill requests through [employer] or call your pharmacy and they will forward the refill request to us. Please allow 3 business days for your refill to be completed.          Additional Information About Your Visit        Care EveryWhere ID     This is your Care EveryWhere ID. This could be used by other organizations to access your [facility] medical records  [id number]        Your Vitals Were     Pulse Temperature Respirations Pulse Oximetry BMI (Body Mass Index)       74 98.9  F (37.2  C) (Oral) 16 99% 29.26 kg/m2        Blood Pressure from Last 3 Encounters:   11/09/18 122/74   09/18/18 126/82   08/07/18 120/70    Weight from Last 3 Encounters:   11/09/18 135 lb 3.2 oz (61.3 kg)   09/18/18 135 lb (61.2 kg)   08/07/18 137 lb (62.1 kg)              We Performed the Following     Comprehensive metabolic panel     TSH with free T4 reflex     Vitamin B12     Vitamin D Deficiency           Today's Medication Changes          These changes are accurate as of 11/9/18 10:49 AM.  If you have any questions, ask your nurse or doctor.               Start taking these medicines.        Dose/Directions    oxyCODONE IR 5 MG tablet   Commonly known as:  ROXICODONE   Used for:  Chronic pain syndrome   Started by:  Brittnee Sharma MD        Dose:  5 mg   Take 1 tablet (5 mg) by mouth every 6 hours as needed for severe pain   Quantity:  60 tablet   Refills:  0            Where to get your medicines      These medications were sent to [facility] [address] [place] [address] [id number] [place] [address] AT [place] [address]  [address] [facility] [address]     Phone:  [phone]     lisinopril 10 MG tablet    omeprazole 40 MG capsule         Some of these will need a paper prescription and others can be bought over the counter.  Ask your nurse if you have questions.     Bring a paper prescription for each of these medications     oxyCODONE IR 5 MG tablet       You don't need a prescription for these medications     cyanocobalamin 1000 MCG/ML injection               Information about OPIOIDS     PRESCRIPTION OPIOIDS: WHAT YOU NEED TO KNOW   We gave you an opioid (narcotic) pain medicine. It is important to manage your pain, but opioids are not always the best choice. You should first try all the other options your care team gave you. Take this medicine for as short a time (and as few doses) as possible.    Some activities can increase your pain, such as bandage changes or therapy sessions. It may help to take your pain medicine 30 to 60 minutes before these activities. Reduce your stress by getting enough sleep, working on hobbies you enjoy and practicing relaxation or meditation. Talk to your care team about ways to manage your pain beyond prescription opioids.    These medicines have risks:    DO NOT drive when on new or higher doses of pain medicine. These medicines can affect  your alertness and reaction times, and you could be arrested for driving under the influence (DUI). If you need to use opioids long-term, talk to your care team about driving.    DO NOT operate heavy machinery    DO NOT do any other dangerous activities while taking these medicines.    DO NOT drink any alcohol while taking these medicines.     If the opioid prescribed includes acetaminophen, DO NOT take with any other medicines that contain acetaminophen. Read all labels carefully. Look for the word  acetaminophen  or  Tylenol.  Ask your pharmacist if you have questions or are unsure.    You can get addicted to pain medicines, especially if you have a history of addiction (chemical, alcohol or substance dependence). Talk to your care team about ways to reduce this risk.    All opioids tend to cause constipation. Drink plenty of water and eat foods that have a lot of fiber, such as fruits, vegetables, prune juice, apple juice and high-fiber cereal. Take a laxative (Miralax, milk of magnesia, Colace, Senna) if you don t move your bowels at least every other day. Other side effects include upset stomach, sleepiness, dizziness, throwing up, tolerance (needing more of the medicine to have the same effect), physical dependence and slowed breathing.    Store your pills in a secure place, locked if possible. We will not replace any lost or stolen medicine. If you don t finish your medicine, please throw away (dispose) as directed by your pharmacist. The [place] Pollution [employer] has more information about safe disposal: [email or web address].[email or web address]-medications         Primary Care Provider Office Phone # Fax #    Brittnee Sharma [phone]       [address]  [address]   [facility] [address]        Equal Access to Services     EDWINA PAIGE : Nuria Salamanca, zaire sandhu, marita bain. So  [facility] [phone].    ATENCIÓN: Si shobha morgan, tiene a frank disposición servicios gratuitos de asistencia lingüística. Emeka hughes [phone].    We comply with applicable federal civil rights laws and [place] laws. We do not discriminate on the basis of race, color, national origin, age, disability, sex, sexual orientation, or gender identity.            Thank you!     Thank you for choosing [facility]  for your care. Our goal is always to provide you with excellent care. Hearing back from our patients is one way we can continue to improve our services. Please take a few minutes to complete the written survey that you may receive in the mail after your visit with us. Thank you!             Your Updated Medication List - Protect others around you: Learn how to safely use, store and throw away your medicines at www.disposemymeds.org.          This list is accurate as of 11/9/18 10:49 AM.  Always use your most recent med list.                   Brand Name Dispense Instructions for use Diagnosis    cyanocobalamin 1000 MCG/ML injection    VITAMIN B12    1 mL    Inject 1 mL (1,000 mcg) into the muscle every 30 days    Vitamin B12 deficiency (non anemic)       diclofenac 1 % Gel topical gel    VOLTAREN    100 g    Apply 2 g topically 4 times daily Apply 4 grams to knees or 2 grams to hands four times daily using enclosed dosing card.    Osteoarthritis of multiple joints, unspecified osteoarthritis type       lisinopril 10 MG tablet    PRINIVIL/ZESTRIL    90 tablet    Take 1 tablet (10 mg) by mouth daily    Essential hypertension, benign       omeprazole 40 MG capsule    priLOSEC    180 capsule    Take 1 capsule (40 mg) by mouth 2 times daily Take 30-60 minutes before a meal.    Peptic ulcer       oxyCODONE IR 5 MG tablet    ROXICODONE    60 tablet    Take 1 tablet (5 mg) by mouth every 6 hours as needed for severe pain    Chronic pain syndrome       PRESERVISION AREDS 2 Caps      Take 240 mg by mouth 2  times daily (with meals)

## 2018-11-10 LAB
ALBUMIN SERPL-MCNC: 3.8 G/DL (ref 3.4–5)
ALP SERPL-CCNC: 98 U/L (ref 40–150)
ALT SERPL W P-5'-P-CCNC: 20 U/L (ref 0–50)
ANION GAP SERPL CALCULATED.3IONS-SCNC: 11 MMOL/L (ref 3–14)
AST SERPL W P-5'-P-CCNC: 23 U/L (ref 0–45)
BILIRUB SERPL-MCNC: 0.8 MG/DL (ref 0.2–1.3)
BUN SERPL-MCNC: 10 MG/DL (ref 7–30)
CALCIUM SERPL-MCNC: 9.1 MG/DL (ref 8.5–10.1)
CHLORIDE SERPL-SCNC: 103 MMOL/L (ref 94–109)
CO2 SERPL-SCNC: 25 MMOL/L (ref 20–32)
CREAT SERPL-MCNC: 0.61 MG/DL (ref 0.52–1.04)
GFR SERPL CREATININE-BSD FRML MDRD: >90 ML/MIN/1.7M2
GLUCOSE SERPL-MCNC: 82 MG/DL (ref 70–99)
POTASSIUM SERPL-SCNC: 3.5 MMOL/L (ref 3.4–5.3)
PROT SERPL-MCNC: 6.7 G/DL (ref 6.8–8.8)
SODIUM SERPL-SCNC: 139 MMOL/L (ref 133–144)
TSH SERPL DL<=0.005 MIU/L-ACNC: 1.63 MU/L (ref 0.4–4)

## 2018-11-10 ASSESSMENT — ANXIETY QUESTIONNAIRES: GAD7 TOTAL SCORE: 10

## 2018-11-12 ENCOUNTER — TELEPHONE (OUTPATIENT)
Dept: FAMILY MEDICINE | Facility: CLINIC | Age: 68
End: 2018-11-12

## 2018-11-12 DIAGNOSIS — J20.9 ACUTE BRONCHITIS, UNSPECIFIED ORGANISM: Primary | ICD-10-CM

## 2018-11-12 LAB — DEPRECATED CALCIDIOL+CALCIFEROL SERPL-MC: 30 UG/L (ref 20–75)

## 2018-11-12 RX ORDER — DOXYCYCLINE HYCLATE 100 MG
100 TABLET ORAL 2 TIMES DAILY
Qty: 20 TABLET | Refills: 0 | Status: SHIPPED | OUTPATIENT
Start: 2018-11-12 | End: 2018-11-14

## 2018-11-12 NOTE — TELEPHONE ENCOUNTER
Patient calling stating she was seen Friday for cough and still continues to have cough with increasing pain on right side.  Complains of some rattling in chest, tightness and having a hard time breathing at times.  Wondering if she could get treated.  Please advise.  [phone].  Shruthi Russo RN

## 2018-11-12 NOTE — TELEPHONE ENCOUNTER
Left a detailed message on voice mail with information that rx was sent to pharmacy.    Shruthi Russo RN

## 2018-11-14 ENCOUNTER — TELEPHONE (OUTPATIENT)
Dept: FAMILY MEDICINE | Facility: CLINIC | Age: 68
End: 2018-11-14

## 2018-11-14 DIAGNOSIS — L03.119 CELLULITIS OF LOWER EXTREMITY, UNSPECIFIED LATERALITY: ICD-10-CM

## 2018-11-14 RX ORDER — CEPHALEXIN 500 MG/1
500 CAPSULE ORAL 2 TIMES DAILY
Qty: 20 CAPSULE | Refills: 0 | Status: SHIPPED | OUTPATIENT
Start: 2018-11-14 | End: 2019-10-09

## 2018-11-14 NOTE — TELEPHONE ENCOUNTER
Patient calling stating she cannot handle the doxycycline.  She has a very upset stomach, vomiting and headache (due to vomiting she thinks)  Wondering if she can switch her antibiotic to Cephalexin as she has done very well on that in the past.  RX t'd up.  Please advise.  Call patient back if and when it has been approved.  Shruthi Russo RN

## 2018-12-10 ENCOUNTER — ALLIED HEALTH/NURSE VISIT (OUTPATIENT)
Dept: NURSING | Facility: CLINIC | Age: 68
End: 2018-12-10
Payer: COMMERCIAL

## 2018-12-10 DIAGNOSIS — E53.8 VITAMIN B12 DEFICIENCY WITHOUT ANEMIA: Primary | ICD-10-CM

## 2018-12-10 PROCEDURE — 96372 THER/PROPH/DIAG INJ SC/IM: CPT

## 2018-12-10 RX ORDER — CYANOCOBALAMIN 1000 UG/ML
1000 INJECTION, SOLUTION INTRAMUSCULAR; SUBCUTANEOUS
Status: CANCELLED | OUTPATIENT
Start: 2018-12-10

## 2018-12-10 RX ORDER — CYANOCOBALAMIN 1000 UG/ML
1000 INJECTION, SOLUTION INTRAMUSCULAR; SUBCUTANEOUS
Status: DISCONTINUED | OUTPATIENT
Start: 2018-12-10 | End: 2021-06-15

## 2018-12-10 RX ADMIN — CYANOCOBALAMIN 1000 MCG: 1000 INJECTION, SOLUTION INTRAMUSCULAR; SUBCUTANEOUS at 14:52

## 2018-12-10 NOTE — NURSING NOTE
Prior to injection, verified patient identity using patient's name and date of birth.  Due to injection administration, patient instructed to remain in clinic for 15 minutes  afterwards, and to report any adverse reaction to me immediately.    Shari Schoenberger, CMA

## 2019-01-11 ENCOUNTER — TELEPHONE (OUTPATIENT)
Dept: FAMILY MEDICINE | Facility: CLINIC | Age: 69
End: 2019-01-11

## 2019-01-11 DIAGNOSIS — J30.89 ACUTE NONSEASONAL ALLERGIC RHINITIS DUE TO FUNGAL SPORES: Primary | ICD-10-CM
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
## 2019-01-11 NOTE — TELEPHONE ENCOUNTER
I do not know how to test for this type of exposure. I am going to ask care coordination. Are there any community resources?

## 2019-01-13 PROCEDURE — 82274 ASSAY TEST FOR BLOOD FECAL: CPT | Performed by: FAMILY MEDICINE

## 2019-01-14 ENCOUNTER — ALLIED HEALTH/NURSE VISIT (OUTPATIENT)
Dept: NURSING | Facility: CLINIC | Age: 69
End: 2019-01-14
Payer: MEDICARE

## 2019-01-14 DIAGNOSIS — E53.8 VITAMIN B12 DEFICIENCY WITHOUT ANEMIA: Primary | ICD-10-CM

## 2019-01-14 NOTE — PROGRESS NOTES
Prior to injection, verified patient identity using patient's name and date of birth.  Due to injection administration, patient instructed to remain in clinic for 15 minutes  afterwards, and to report any adverse reaction to me immediately.    Vitamin B 12    Drug Amount Wasted:  None.  Vial/Syringe: Single dose vial  Expiration Date:  7/2020

## 2019-01-14 NOTE — TELEPHONE ENCOUNTER
Spoke with patient.  She accepts referral.  Referral approved and mailed to patient per her request.  She will contact them.  Shruthi Russo RN

## 2019-01-17 DIAGNOSIS — Z12.11 SPECIAL SCREENING FOR MALIGNANT NEOPLASMS, COLON: ICD-10-CM

## 2019-01-17 LAB — HEMOCCULT STL QL IA: NEGATIVE

## 2019-02-01 ENCOUNTER — ALLIED HEALTH/NURSE VISIT (OUTPATIENT)
Dept: NURSING | Facility: CLINIC | Age: 69
End: 2019-02-01
Payer: MEDICARE

## 2019-02-01 DIAGNOSIS — E53.8 VITAMIN B12 DEFICIENCY WITHOUT ANEMIA: Primary | ICD-10-CM

## 2019-02-01 PROCEDURE — 96372 THER/PROPH/DIAG INJ SC/IM: CPT

## 2019-02-01 PROCEDURE — 99207 ZZC NO CHARGE NURSE ONLY: CPT

## 2019-02-01 RX ADMIN — CYANOCOBALAMIN 1000 MCG: 1000 INJECTION, SOLUTION INTRAMUSCULAR; SUBCUTANEOUS at 09:37

## 2019-02-12 NOTE — PROGRESS NOTES
Pt has not returned for therapy since 10/5/18.  Assume all goals are met to pt satisfaction.  D/C [facility].

## 2019-03-13 ENCOUNTER — ALLIED HEALTH/NURSE VISIT (OUTPATIENT)
Dept: NURSING | Facility: CLINIC | Age: 69
End: 2019-03-13
Payer: MEDICARE

## 2019-03-13 DIAGNOSIS — E53.8 VITAMIN B12 DEFICIENCY WITHOUT ANEMIA: Primary | ICD-10-CM

## 2019-03-13 PROCEDURE — 99207 ZZC NO CHARGE NURSE ONLY: CPT

## 2019-03-13 PROCEDURE — 96372 THER/PROPH/DIAG INJ SC/IM: CPT

## 2019-03-13 RX ADMIN — CYANOCOBALAMIN 1000 MCG: 1000 INJECTION, SOLUTION INTRAMUSCULAR; SUBCUTANEOUS at 10:30

## 2019-03-13 NOTE — PROGRESS NOTES
Prior to injection, verified patient identity using patient's name and date of birth.  Due to injection administration, patient instructed to remain in clinic for 15 minutes  afterwards, and to report any adverse reaction to me immediately.    Vit b12    Drug Amount Wasted:  None.  Vial/Syringe: Single dose vial  Expiration Date:  08/2020    Shari Schoenberger, CMA

## 2019-04-03 ENCOUNTER — TELEPHONE (OUTPATIENT)
Dept: FAMILY MEDICINE | Facility: CLINIC | Age: 69
End: 2019-04-03

## 2019-04-17 ENCOUNTER — OFFICE VISIT (OUTPATIENT)
Dept: FAMILY MEDICINE | Facility: CLINIC | Age: 69
End: 2019-04-17
Payer: MEDICARE

## 2019-04-17 VITALS
RESPIRATION RATE: 16 BRPM | HEART RATE: 70 BPM | BODY MASS INDEX: 27.77 KG/M2 | SYSTOLIC BLOOD PRESSURE: 118 MMHG | DIASTOLIC BLOOD PRESSURE: 82 MMHG | WEIGHT: 128.7 LBS | TEMPERATURE: 97.5 F | HEIGHT: 57 IN

## 2019-04-17 DIAGNOSIS — K21.9 GASTROESOPHAGEAL REFLUX DISEASE, ESOPHAGITIS PRESENCE NOT SPECIFIED: ICD-10-CM

## 2019-04-17 DIAGNOSIS — G89.4 CHRONIC PAIN SYNDROME: ICD-10-CM

## 2019-04-17 DIAGNOSIS — K27.9 PEPTIC ULCER: ICD-10-CM

## 2019-04-17 DIAGNOSIS — R63.4 WEIGHT LOSS, UNINTENTIONAL: ICD-10-CM

## 2019-04-17 DIAGNOSIS — I10 ESSENTIAL HYPERTENSION, BENIGN: Primary | ICD-10-CM

## 2019-04-17 PROCEDURE — 96372 THER/PROPH/DIAG INJ SC/IM: CPT | Performed by: FAMILY MEDICINE

## 2019-04-17 PROCEDURE — 99214 OFFICE O/P EST MOD 30 MIN: CPT | Mod: 25 | Performed by: FAMILY MEDICINE

## 2019-04-17 RX ORDER — LISINOPRIL 10 MG/1
10 TABLET ORAL DAILY
Qty: 90 TABLET | Refills: 1 | Status: SHIPPED | OUTPATIENT
Start: 2019-04-17 | End: 2019-10-09

## 2019-04-17 RX ORDER — SUCRALFATE ORAL 1 G/10ML
1 SUSPENSION ORAL 4 TIMES DAILY
Qty: 420 ML | Refills: 3 | Status: SHIPPED | OUTPATIENT
Start: 2019-04-17 | End: 2019-10-09

## 2019-04-17 RX ORDER — OMEPRAZOLE 40 MG/1
40 CAPSULE, DELAYED RELEASE ORAL
Qty: 180 CAPSULE | Refills: 3 | Status: SHIPPED | OUTPATIENT
Start: 2019-04-17 | End: 2019-10-09

## 2019-04-17 RX ADMIN — CYANOCOBALAMIN 1000 MCG: 1000 INJECTION, SOLUTION INTRAMUSCULAR; SUBCUTANEOUS at 11:44

## 2019-04-17 ASSESSMENT — MIFFLIN-ST. JEOR: SCORE: 978.69

## 2019-04-17 NOTE — LETTER
[address], [address]  [facility] [address]  Phone: [phone]  Fax: [phone]    April 17, 2019        Keiko Kimball  [id number] [address]          To whom it may concern:    RE: Keiko Kimball    Patient was seen and treated today at our clinic. Keiko is unable to sit for jury duty, due to her medical conditions. She is disabled and unable to set for more than 1 hour and has random bouts of vomiting.    Please contact me for questions or concerns.      Sincerely,        Brittnee Sharma MD
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
## 2019-04-17 NOTE — PROGRESS NOTES
The following medication was given:     MEDICATION: Vitamin B12  1000 mcg  ROUTE: IM  SITE: Deltoid - Right  DOSE: 1 ml   LOT #: [id number]  :  [employer]  EXPIRATION DATE:  08/20  NDC#: 4842-8656-64    Lisa Magill, CMA

## 2019-05-07 ENCOUNTER — ANCILLARY PROCEDURE (OUTPATIENT)
Dept: GENERAL RADIOLOGY | Facility: CLINIC | Age: 69
End: 2019-05-07
Attending: PHYSICIAN ASSISTANT
Payer: MEDICARE

## 2019-05-07 ENCOUNTER — OFFICE VISIT (OUTPATIENT)
Dept: FAMILY MEDICINE | Facility: CLINIC | Age: 69
End: 2019-05-07
Payer: MEDICARE

## 2019-05-07 VITALS
DIASTOLIC BLOOD PRESSURE: 78 MMHG | TEMPERATURE: 98.4 F | HEART RATE: 79 BPM | OXYGEN SATURATION: 99 % | SYSTOLIC BLOOD PRESSURE: 110 MMHG

## 2019-05-07 DIAGNOSIS — L03.012 CELLULITIS OF FINGER OF LEFT HAND: ICD-10-CM

## 2019-05-07 DIAGNOSIS — E21.3 HYPERPARATHYROIDISM (H): ICD-10-CM

## 2019-05-07 DIAGNOSIS — L03.012 CELLULITIS OF FINGER OF LEFT HAND: Primary | ICD-10-CM

## 2019-05-07 DIAGNOSIS — F11.20 UNCOMPLICATED OPIOID DEPENDENCE (H): ICD-10-CM

## 2019-05-07 PROCEDURE — 96372 THER/PROPH/DIAG INJ SC/IM: CPT | Performed by: PHYSICIAN ASSISTANT

## 2019-05-07 PROCEDURE — 99213 OFFICE O/P EST LOW 20 MIN: CPT | Performed by: PHYSICIAN ASSISTANT

## 2019-05-07 PROCEDURE — 73140 X-RAY EXAM OF FINGER(S): CPT | Mod: LT | Performed by: FAMILY MEDICINE

## 2019-05-07 RX ADMIN — CYANOCOBALAMIN 1000 MCG: 1000 INJECTION, SOLUTION INTRAMUSCULAR; SUBCUTANEOUS at 14:10

## 2019-05-07 NOTE — PATIENT INSTRUCTIONS
Patient Education     Discharge Instructions for Cellulitis  You have been diagnosed with cellulitis. This is an infection in the deepest layer of the skin. In some cases, the infection also affects the muscle. Cellulitis is caused by bacteria. The bacteria can enter the body through broken skin. This can happen with a cut, scratch, animal bite, or an insect bite that has been scratched. You may have been treated in the hospital with antibiotics and fluids. You will likely be given a prescription for antibiotics to take at home. This sheet will help you take care of yourself at home.  Home care  When you are home:    Take the prescribed antibiotic medicine you are given as directed until it is gone. Take it even if you feel better. It treats the infection and stops it from returning. Not taking all the medicine can make future infections hard to treat.    Keep the infected area clean.    When possible, raise the infected area above the level of your heart. This helps keep swelling down.    Talk with your healthcare provider if you are in pain. Ask what kind of over-the-counter medicine you can take for pain.    Apply clean bandages as advised.    Take your temperature once a day for a week.    Wash your hands often to prevent spreading the infection.  In the future, wash your hands before and after you touch cuts, scratches, or bandages. This will help prevent infection.   When to call your healthcare provider  Call your healthcare provider immediately if you have any of the following:    Difficulty or pain when moving the joints above or below the infected area    Discharge or pus draining from the area    Fever of 100.4 F (38 C) or higher, or as directed by your healthcare provider    Pain that gets worse in or around the infected     Redness that gets worse in or around the infected area, particularly if the area of redness expands to a wider area    Shaking chills    Swelling of the infected area    Vomiting    Date Last Reviewed: 8/1/2016 2000-2018 [employer]. [address] [place] [address], [place] [address]. All rights reserved. This information is not intended as a substitute for professional medical care. Always follow your healthcare professional's instructions.

## 2019-05-07 NOTE — PROGRESS NOTES
SUBJECTIVE:   Keiko Kimball is a 68 year old female who presents to clinic today for the following health issues:      HPI  Concern - cut her left thumb with glass and it is painful and hot  Onset: 5/3/19    Description:   Small cut on her thumb        Progression of Symptoms:  worsening      Therapies Tried and outcome: tried ointment and didn't help     No fever or chills. Of note, patient has no function of her right hand. Relies heavily on her left hand for function.     Would like a B12 injection today     Additional history: as documented    Reviewed and updated as needed this visit by clinical staff  Tobacco  Allergies  Meds  Med Hx  Surg Hx  Fam Hx  Soc Hx        Reviewed and updated as needed this visit by Provider         Patient Active Problem List   Diagnosis     Carpal tunnel syndrome     Atypical face pain     Vitamin D deficiency     Osteoporosis     Peptic ulcer     Compression fracture of lumbar vertebra (H)     Reflex sympathetic dystrophy of the arm     Fibromyalgia     OA (osteoarthritis) of knee     Seasonal allergic rhinitis     CARDIOVASCULAR SCREENING; LDL GOAL LESS THAN 160     Rotator cuff tear     Pseudogout     Bisphosphonate-related jaw necrosis (H)     Pain in joint, shoulder region     GERD (gastroesophageal reflux disease)     Chronic pain syndrome     Trigger finger, acquired     Arthralgia of both knees     Chronic dislocation of shoulder, right     Vitamin B12 deficiency without anemia     Other iron deficiency anemia     Other insomnia     Essential hypertension, benign     Intractable vomiting without nausea, vomiting of unspecified type     History of Yoandy-en-Y gastric bypass     Uncomplicated opioid dependence (H)     Complex regional pain syndrome type 1 of right upper extremity     Hyperparathyroidism (H)     Advanced directives, counseling/discussion     Macular degeneration (senile) of retina     Past Surgical History:   Procedure Laterality Date     ARTHROPLASTY  ELBOW  3/8/2012    Procedure:ARTHROPLASTY ELBOW; Right Total Elbow Arthroplasty Complex, Right Metal Revision; Surgeon:DONA STEVENSON; Location:[facility]     C APPENDECTOMY       C EXCIS KNEE CARTILAGE,MEDIAL & LAT  1994    Lt knee     C FOOT/TOES SURGERY PROC UNLISTED  1995    Lt foot , tendon repair     C LIGATE ETHMOID ARTERY  1997    developed facial pain syndrome post surgeries     C LIGATE INTERN MAXILL ARTERY  1997     C SHOULDER SURG PROC UNLISTED  8/2006    Rt shoulder replacement surgery     GASTRIC BYPASS  1981, 2001    bypass 25 yrs ago, revised in 2001     HC REVISE MEDIAN N/CARPAL TUNNEL SURG  1999     INTERPOSITION TENDON HAND  6/28/2012    Procedure: INTERPOSITION TENDON HAND;  Right Arm  Radial Nerve Tendon Transfers, Pronator Terres  to Extensor Carpi Radialis Brevis, Palmaris Longus to Extensor Pollicis Longus. Flexor Carpi Ulnaris to Extensor Digitorum Communis  ;  Surgeon: Dona Stevenson MD;  Location: [facility]     ORTHOPEDIC SURGERY  2011    shoulder- replacement     RELEASE TRIGGER FINGER  4/23/2013    Procedure: RELEASE TRIGGER FINGER;  Left Index, Middle and Ring Trigger Finger Releases.;  Surgeon: Dona Stevenson MD;  Location: [facility]     ROTATOR CUFF REPAIR RT/LT  2004    rt side     TONSILLECTOMY         Social History     Tobacco Use     Smoking status: Never Smoker     Smokeless tobacco: Never Used   Substance Use Topics     Alcohol use: Yes     Comment: rarely     Family History   Problem Relation Age of Onset     Cancer Mother         lymphoma     Cancer Brother         esophageal ca         Current Outpatient Medications   Medication Sig Dispense Refill     cephALEXin (KEFLEX) 250 MG capsule Take 1 capsule (250 mg) by mouth 3 times daily for 7 days 21 capsule 0     cephALEXin (KEFLEX) 500 MG capsule Take 1 capsule (500 mg) by mouth 2 times daily 20 capsule 0     cyanocobalamin (VITAMIN B12) 1000 MCG/ML injection Inject 1 mL (1,000 mcg) into the muscle every 30  days 1 mL 11     diclofenac (VOLTAREN) 1 % GEL Apply 2 g topically 4 times daily Apply 4 grams to knees or 2 grams to hands four times daily using enclosed dosing card. 100 g 0     lisinopril (PRINIVIL/ZESTRIL) 10 MG tablet Take 1 tablet (10 mg) by mouth daily 90 tablet 1     medical cannabis (Patient's own supply) See Admin Instructions (The purpose of this order is to document that the patient reports taking medical cannabis.  This is not a prescription, and is not used to certify that the patient has a qualifying medical condition.)       Multiple Vitamins-Minerals (PRESERVISION AREDS 2) CAPS Take 240 mg by mouth 2 times daily (with meals)       omeprazole (PRILOSEC) 40 MG DR capsule Take 1 capsule (40 mg) by mouth 2 times daily Take 30-60 minutes before a meal. 180 capsule 3     sucralfate (CARAFATE) 1 GM/10ML suspension Take 10 mLs (1 g) by mouth 4 times daily 420 mL 3     Allergies   Allergen Reactions     Actonel [Risedronate Sodium] Anaphylaxis     Fentanyl Hives     Azithromycin Palpitations     Celebrex [Celecoxib] Rash     Asa [Aspirin]      Pt cannot use ASA, or salicylates because she has clippingsof arteries in her nose sec to bleeding     Boniva [Ibandronate Sodium]      Jaw pain, nausea , vomiting , teeth pain . Pt has rt hip fracture      Codeine Nausea and Vomiting     Dilaudid Cough [Dilaudid Cough]      headaches     Nsaids      Pt had bypass, duodenal ulcer, esophageal ulcer, cannot take NSAIDS     Tramadol      Nausea , vomiting      Vicodin [Hydrocodone-Acetaminophen]      headaches     Vistaril Other (See Comments)     Headaches and dry heaves       Codeine Nausea and Vomiting and Rash     Hydrocodone Nausea and Vomiting and Rash     Sulfa Drugs Hives and Rash     BP Readings from Last 3 Encounters:   05/07/19 110/78   04/17/19 118/82   11/09/18 122/74    Wt Readings from Last 3 Encounters:   04/17/19 58.4 kg (128 lb 11.2 oz)   11/09/18 61.3 kg (135 lb 3.2 oz)   09/18/18 61.2 kg (135 lb)                     ROS:  Constitutional,\ skin, neuro, msk, cardiovascular, pulmonary, gi and gu systems are negative, except as otherwise noted.    OBJECTIVE:     /78 (BP Location: Right arm, Patient Position: Sitting, Cuff Size: Adult Regular)   Pulse 79   Temp 98.4  F (36.9  C) (Oral)   SpO2 99%   There is no height or weight on file to calculate BMI.  GENERAL: alert and no distress  SKIN: small scabbed laceration noted on tip of left thumb with mild surrounding warmth and erythema. No abscess noted. Tenderness to palpation. No tenderness to palpation along pulp of thumb.   PSYCH: mentation appears normal, affect normal/bright    Diagnostic Test Results:  Xray - left thumb 2 views:     ASSESSMENT/PLAN:     (L03.012) Cellulitis of finger of left hand  (primary encounter diagnosis)  Comment: mild on exam. Possibly does not need abx, however, given inability to use right hand, will treat with abx. States had upset stomach with 500 mg dose in November. Will decrease dose and extend TID.   Plan: cephALEXin (KEFLEX) 250 MG capsule, XR Finger         Left G/E 2 Views        -Medication use and side effects discussed with the patient. Patient is in complete understanding and agreement with plan.       (F11.20) Uncomplicated opioid dependence (H)  Comment: no opioids currently taken.   Plan:     (E21.3) Hyperparathyroidism (H)  Comment: history of this. Normal vitamin d levels in nov. Normal parathyroid levels in 7/2017  Plan:     Follow up: as above     Jet Mcdonald PA-C  [facility]

## 2019-05-16 ENCOUNTER — DOCUMENTATION ONLY (OUTPATIENT)
Dept: FAMILY MEDICINE | Facility: CLINIC | Age: 69
End: 2019-05-16

## 2019-06-03 ENCOUNTER — ALLIED HEALTH/NURSE VISIT (OUTPATIENT)
Dept: NURSING | Facility: CLINIC | Age: 69
End: 2019-06-03
Payer: MEDICARE

## 2019-06-03 DIAGNOSIS — E53.8 VITAMIN B12 DEFICIENCY WITHOUT ANEMIA: ICD-10-CM

## 2019-06-03 DIAGNOSIS — E55.9 VITAMIN D DEFICIENCY: Primary | ICD-10-CM

## 2019-06-03 PROCEDURE — 96372 THER/PROPH/DIAG INJ SC/IM: CPT

## 2019-06-03 PROCEDURE — 99207 ZZC NO CHARGE NURSE ONLY: CPT

## 2019-06-03 RX ADMIN — CYANOCOBALAMIN 1000 MCG: 1000 INJECTION, SOLUTION INTRAMUSCULAR; SUBCUTANEOUS at 14:14

## 2019-06-25 ENCOUNTER — NURSE TRIAGE (OUTPATIENT)
Dept: NURSING | Facility: CLINIC | Age: 69
End: 2019-06-25

## 2019-06-25 NOTE — TELEPHONE ENCOUNTER
"Call received from \"[facility]\", advised no documented calls placed from .  No other needs.    Reason for Disposition    [1] Follow-up call to recent contact AND [2] information only call, no triage required    Protocols used: INFORMATION ONLY CALL-A-      "

## 2019-07-10 ENCOUNTER — ALLIED HEALTH/NURSE VISIT (OUTPATIENT)
Dept: NURSING | Facility: CLINIC | Age: 69
End: 2019-07-10
Payer: MEDICARE

## 2019-07-10 DIAGNOSIS — E53.8 VITAMIN B12 DEFICIENCY WITHOUT ANEMIA: Primary | ICD-10-CM

## 2019-07-10 PROCEDURE — 99207 ZZC NO CHARGE NURSE ONLY: CPT

## 2019-07-10 PROCEDURE — 96372 THER/PROPH/DIAG INJ SC/IM: CPT

## 2019-07-10 RX ADMIN — CYANOCOBALAMIN 1000 MCG: 1000 INJECTION, SOLUTION INTRAMUSCULAR; SUBCUTANEOUS at 10:13

## 2019-07-10 NOTE — PROGRESS NOTES
The following medication was given:     MEDICATION: Vitamin B12  1000mcg  ROUTE: IM  SITE: Deltoid - Left  DOSE: 1mL  LOT #: [id number]  :  [employer] [place]  EXPIRATION DATE:  10/2020  NDC#: 9472-3900-02  Joanna Aiken CMA

## 2019-07-19 ENCOUNTER — TELEPHONE (OUTPATIENT)
Dept: FAMILY MEDICINE | Facility: CLINIC | Age: 69
End: 2019-07-19

## 2019-07-19 NOTE — TELEPHONE ENCOUNTER
7/19/19    Pt called needing her paper work for medical cannabis renewed. Please contact Pt @ [phone]  When Dr Sharma completes the paper work. Can leave message

## 2019-07-26 NOTE — TELEPHONE ENCOUNTER
Dr. Sharma:    Is this paperwork in AV or FM?     Heather Rubio RN -- [facility]

## 2019-07-29 NOTE — TELEPHONE ENCOUNTER
Called and Left detailed message for the Pt letting her know it is complete.     Heather Rubio RN -- [facility]

## 2019-08-06 ENCOUNTER — ALLIED HEALTH/NURSE VISIT (OUTPATIENT)
Dept: NURSING | Facility: CLINIC | Age: 69
End: 2019-08-06
Payer: MEDICARE

## 2019-08-06 DIAGNOSIS — D50.8 OTHER IRON DEFICIENCY ANEMIA: Primary | ICD-10-CM

## 2019-08-06 PROCEDURE — 96372 THER/PROPH/DIAG INJ SC/IM: CPT

## 2019-08-06 PROCEDURE — 99207 ZZC NO CHARGE NURSE ONLY: CPT

## 2019-08-06 RX ADMIN — CYANOCOBALAMIN 1000 MCG: 1000 INJECTION, SOLUTION INTRAMUSCULAR; SUBCUTANEOUS at 14:13

## 2019-09-11 ENCOUNTER — ALLIED HEALTH/NURSE VISIT (OUTPATIENT)
Dept: NURSING | Facility: CLINIC | Age: 69
End: 2019-09-11
Payer: MEDICARE

## 2019-09-11 DIAGNOSIS — E53.8 VITAMIN B12 DEFICIENCY WITHOUT ANEMIA: Primary | ICD-10-CM

## 2019-09-11 PROCEDURE — 96372 THER/PROPH/DIAG INJ SC/IM: CPT

## 2019-09-11 RX ADMIN — CYANOCOBALAMIN 1000 MCG: 1000 INJECTION, SOLUTION INTRAMUSCULAR; SUBCUTANEOUS at 10:20

## 2019-09-19 ENCOUNTER — TELEPHONE (OUTPATIENT)
Dept: FAMILY MEDICINE | Facility: CLINIC | Age: 69
End: 2019-09-19

## 2019-09-19 NOTE — TELEPHONE ENCOUNTER
9/19/19    Pt called letting us know she has had her Flu shot done at [facility] (Pt stated it was the [facility]).   Not sure how or where to document this.  
Updated in patients chart.    Shruthi Russo RN  
no

## 2019-10-09 ENCOUNTER — OFFICE VISIT (OUTPATIENT)
Dept: FAMILY MEDICINE | Facility: CLINIC | Age: 69
End: 2019-10-09
Payer: MEDICARE

## 2019-10-09 VITALS
HEART RATE: 68 BPM | DIASTOLIC BLOOD PRESSURE: 68 MMHG | BODY MASS INDEX: 27.51 KG/M2 | WEIGHT: 126 LBS | OXYGEN SATURATION: 98 % | SYSTOLIC BLOOD PRESSURE: 102 MMHG | TEMPERATURE: 98.3 F | RESPIRATION RATE: 16 BRPM

## 2019-10-09 DIAGNOSIS — I10 ESSENTIAL HYPERTENSION, BENIGN: Primary | ICD-10-CM

## 2019-10-09 DIAGNOSIS — K27.9 PEPTIC ULCER: ICD-10-CM

## 2019-10-09 DIAGNOSIS — Z12.11 SPECIAL SCREENING FOR MALIGNANT NEOPLASMS, COLON: ICD-10-CM

## 2019-10-09 DIAGNOSIS — G89.4 CHRONIC PAIN SYNDROME: ICD-10-CM

## 2019-10-09 DIAGNOSIS — M24.411 CHRONIC DISLOCATION OF SHOULDER, RIGHT: ICD-10-CM

## 2019-10-09 DIAGNOSIS — R63.4 WEIGHT LOSS: ICD-10-CM

## 2019-10-09 DIAGNOSIS — E53.8 VITAMIN B12 DEFICIENCY (NON ANEMIC): ICD-10-CM

## 2019-10-09 DIAGNOSIS — M81.0 AGE RELATED OSTEOPOROSIS, UNSPECIFIED PATHOLOGICAL FRACTURE PRESENCE: ICD-10-CM

## 2019-10-09 DIAGNOSIS — E21.3 HYPERPARATHYROIDISM (H): ICD-10-CM

## 2019-10-09 LAB
ERYTHROCYTE [DISTWIDTH] IN BLOOD BY AUTOMATED COUNT: 13.5 % (ref 10–15)
HCT VFR BLD AUTO: 39.5 % (ref 35–47)
HGB BLD-MCNC: 12.9 G/DL (ref 11.7–15.7)
MCH RBC QN AUTO: 30.8 PG (ref 26.5–33)
MCHC RBC AUTO-ENTMCNC: 32.7 G/DL (ref 31.5–36.5)
MCV RBC AUTO: 94 FL (ref 78–100)
PLATELET # BLD AUTO: 268 10E9/L (ref 150–450)
PTH-INTACT SERPL-MCNC: 26 PG/ML (ref 18–80)
RBC # BLD AUTO: 4.19 10E12/L (ref 3.8–5.2)
VIT B12 SERPL-MCNC: 669 PG/ML (ref 193–986)
WBC # BLD AUTO: 6.1 10E9/L (ref 4–11)

## 2019-10-09 PROCEDURE — 82306 VITAMIN D 25 HYDROXY: CPT | Performed by: FAMILY MEDICINE

## 2019-10-09 PROCEDURE — 82607 VITAMIN B-12: CPT | Performed by: FAMILY MEDICINE

## 2019-10-09 PROCEDURE — 80053 COMPREHEN METABOLIC PANEL: CPT | Performed by: FAMILY MEDICINE

## 2019-10-09 PROCEDURE — 36415 COLL VENOUS BLD VENIPUNCTURE: CPT | Performed by: FAMILY MEDICINE

## 2019-10-09 PROCEDURE — 85027 COMPLETE CBC AUTOMATED: CPT | Performed by: FAMILY MEDICINE

## 2019-10-09 PROCEDURE — 96372 THER/PROPH/DIAG INJ SC/IM: CPT | Performed by: FAMILY MEDICINE

## 2019-10-09 PROCEDURE — 99214 OFFICE O/P EST MOD 30 MIN: CPT | Mod: 25 | Performed by: FAMILY MEDICINE

## 2019-10-09 PROCEDURE — 83970 ASSAY OF PARATHORMONE: CPT | Performed by: FAMILY MEDICINE

## 2019-10-09 RX ORDER — LISINOPRIL 10 MG/1
10 TABLET ORAL DAILY
Qty: 90 TABLET | Refills: 1 | Status: SHIPPED | OUTPATIENT
Start: 2019-10-09 | End: 2020-04-08

## 2019-10-09 RX ORDER — DULOXETIN HYDROCHLORIDE 30 MG/1
30 CAPSULE, DELAYED RELEASE ORAL DAILY
Qty: 30 CAPSULE | Refills: 5 | Status: SHIPPED | OUTPATIENT
Start: 2019-10-09 | End: 2019-10-11

## 2019-10-09 RX ORDER — SENNOSIDES 8.6 MG
650 CAPSULE ORAL EVERY 8 HOURS PRN
COMMUNITY

## 2019-10-09 RX ORDER — OMEPRAZOLE 40 MG/1
40 CAPSULE, DELAYED RELEASE ORAL
Qty: 180 CAPSULE | Refills: 3 | Status: SHIPPED | OUTPATIENT
Start: 2019-10-09 | End: 2020-04-08

## 2019-10-09 RX ADMIN — CYANOCOBALAMIN 1000 MCG: 1000 INJECTION, SOLUTION INTRAMUSCULAR; SUBCUTANEOUS at 12:03

## 2019-10-09 ASSESSMENT — PATIENT HEALTH QUESTIONNAIRE - PHQ9: SUM OF ALL RESPONSES TO PHQ QUESTIONS 1-9: 3

## 2019-10-09 NOTE — PROGRESS NOTES
Subjective     Keiko Kimball is a 69 year old female who presents to clinic today for the following health issues:    HPI   Hypertension Follow-up      Do you check your blood pressure regularly outside of the clinic? No     Are you following a low salt diet? No    Are your blood pressures ever more than 140 on the top number (systolic) OR more   than 90 on the bottom number (diastolic), for example 140/90? No      How many servings of fruits and vegetables do you eat daily?  2-3    On average, how many sweetened beverages do you drink each day (soda, juice, sweet tea, etc)?   2    How many days per week do you miss taking your medication? 0    Chronic pain, going to [place] to medical cannabis dispensory. She is taking Cobalt at bedtime, less then 1 ml at bedtime, was taking it more, but it is costly.     Has lost more weight, did an upper GI, it was negative. She has lost 40# now, and is thinking the CBD oiil helps with appetite and the vomitting, but now cant afford to take it more once per day and does not want to see GI again, since they do not find anything for the chronic nausea and vomtting.    PROBLEMS TO ADD ON...    Recent Labs   Lab Test 11/09/18  1059 07/03/17  1055 01/09/17  1041 06/30/16  1132  09/12/14  1046 09/06/13  0836   LDL  --   --   --  99  --  77 85   HDL  --   --   --  57  --  69 62   TRIG  --   --   --  116  --  77 74   ALT 20  --  17 17   < > 17 29   CR 0.61 0.60 0.56 0.58   < > 0.60 0.55   GFRESTIMATED >90 >90  Non  GFR Calc   >90  Non  GFR Calc   >90  Non  GFR Calc     < > >90  Non  GFR Calc   >90   GFRESTBLACK >90 >90   GFR Calc   >90   GFR Calc   >90   GFR Calc     < > >90   GFR Calc   >90   POTASSIUM 3.5 3.9 3.8 3.7   < > 4.0 4.0   TSH 1.63  --   --   --   --  3.00  --     < > = values in this interval not displayed.      BP Readings from Last 3 Encounters:    10/09/19 102/68   05/07/19 110/78   04/17/19 118/82    Wt Readings from Last 3 Encounters:   10/09/19 57.2 kg (126 lb)   04/17/19 58.4 kg (128 lb 11.2 oz)   11/09/18 61.3 kg (135 lb 3.2 oz)                    Reviewed and updated as needed this visit by Provider         Review of Systems   ROS COMP: Constitutional, HEENT, cardiovascular, pulmonary, gi and gu systems are negative, except as otherwise noted.      Objective    /68 (BP Location: Right arm, Patient Position: Sitting, Cuff Size: Adult Regular)   Pulse 68   Temp 98.3  F (36.8  C) (Oral)   Resp 16   Wt 57.2 kg (126 lb)   SpO2 98%   BMI 27.51 kg/m    Body mass index is 27.51 kg/m .  Physical Exam   GENERAL: healthy, alert and no distress  EYES: Eyes grossly normal to inspection, PERRL and conjunctivae and sclerae normal  HENT: ear canals and TM's normal, nose and mouth without ulcers or lesions  NECK: no adenopathy, no asymmetry, masses, or scars and thyroid normal to palpation  RESP: lungs clear to auscultation - no rales, rhonchi or wheezes  CV: regular rate and rhythm, normal S1 S2, , no peripheral edema and peripheral pulses strong  ABDOMEN: soft, nontender, no hepatosplenomegaly, no masses and bowel sounds normal  MS: shoulder is out of joint on the right, in sling  Very kyphotic    Diagnostic Test Results:  Labs reviewed in Epic        Assessment & Plan     1. Essential hypertension, benign  Contrrolled, cont same  - lisinopril (PRINIVIL/ZESTRIL) 10 MG tablet; Take 1 tablet (10 mg) by mouth daily  Dispense: 90 tablet; Refill: 1  - Comprehensive metabolic panel  - CBC with platelets    2. Peptic ulcer  Cont long term, BID  - omeprazole (PRILOSEC) 40 MG DR capsule; Take 1 capsule (40 mg) by mouth 2 times daily Take 30-60 minutes before a meal.  Dispense: 180 capsule; Refill: 3    3. Chronic dislocation of shoulder, right  Start med for pain that is not a narcotic, cont CBD from medical cannabis, lif[employer]  - DULoxetine (CYMBALTA) 30 MG  "capsule; Take 1 capsule (30 mg) by mouth daily  Dispense: 30 capsule; Refill: 5    4. Chronic pain syndrome    - DULoxetine (CYMBALTA) 30 MG capsule; Take 1 capsule (30 mg) by mouth daily  Dispense: 30 capsule; Refill: 5    5. Special screening for malignant neoplasms, colon    - Fecal colorectal cancer screen (FIT); Future    6. Vitamin B12 deficiency (non anemic)  Due for lab and then shot  - Vitamin B12    7. Hyperparathyroidism (H)    - Parathyroid Hormone Intact    8. Age related osteoporosis, unspecified pathological fracture presence  Pt declined meds and dexa  - Vitamin D Deficiency  - CBC with platelets    9. Weight loss  Pt refuses referrals, pt will try to eat more, encourage her to take more CBD oil to help with appetite   - Comprehensive metabolic panel  - Vitamin B12  - Parathyroid Hormone Intact  - CBC with platelets     BMI:   Estimated body mass index is 27.51 kg/m  as calculated from the following:    Height as of 4/17/19: 1.441 m (4' 8.75\").    Weight as of this encounter: 57.2 kg (126 lb).           Regular exercise    No follow-ups on file.    Brittnee Sharma MD  [facility]    "

## 2019-10-09 NOTE — PROGRESS NOTES
Clinic Administered Medication Documentation    MEDICATION LIST:   Injectable Medication Documentation    Patient was given Cyanocobalamin (B-12). Prior to medication administration, verified patients identity using patient s name and date of birth. Please see MAR and medication order for additional information. Patient instructed to remain in clinic for 15 minutes.      Was entire vial of medication used? Yes  Vial/Syringe: Single dose vial  Expiration Date:  08/2020  Was this medication supplied by the patient? No Lisa Magill, CMA

## 2019-10-09 NOTE — LETTER
October 10, 2019      Keiko Kimball  [address] [facility] [address]  [place] [address]        Dear ,    We are writing to inform you of your test results.    Here are some more labs:   Normal electrolyte panel. The sodium, potassium, sugar, liver and kidneys are all normal. The parathyroid hormone is normal as is the B12.     Resulted Orders   Comprehensive metabolic panel   Result Value Ref Range    Sodium 137 133 - 144 mmol/L    Potassium 4.4 3.4 - 5.3 mmol/L    Chloride 104 94 - 109 mmol/L    Carbon Dioxide 22 20 - 32 mmol/L    Anion Gap 11 3 - 14 mmol/L    Glucose 84 70 - 99 mg/dL    Urea Nitrogen 13 7 - 30 mg/dL    Creatinine 0.53 0.52 - 1.04 mg/dL    GFR Estimate >90 >60 mL/min/[1.73_m2]      Comment:      Non  GFR Calc  Starting 12/18/2018, serum creatinine based estimated GFR (eGFR) will be   calculated using the Chronic Kidney Disease Epidemiology Collaboration   (CKD-EPI) equation.      GFR Estimate If Black >90 >60 mL/min/[1.73_m2]      Comment:       GFR Calc  Starting 12/18/2018, serum creatinine based estimated GFR (eGFR) will be   calculated using the Chronic Kidney Disease Epidemiology Collaboration   (CKD-EPI) equation.      Calcium 8.8 8.5 - 10.1 mg/dL    Bilirubin Total 0.7 0.2 - 1.3 mg/dL    Albumin 3.9 3.4 - 5.0 g/dL    Protein Total 6.2 (L) 6.8 - 8.8 g/dL    Alkaline Phosphatase 115 40 - 150 U/L    ALT 15 0 - 50 U/L    AST 16 0 - 45 U/L   Vitamin B12   Result Value Ref Range    Vitamin B12 669 193 - 986 pg/mL   Parathyroid Hormone Intact   Result Value Ref Range    Parathyroid Hormone Intact 26 18 - 80 pg/mL   CBC with platelets   Result Value Ref Range    WBC 6.1 4.0 - 11.0 10e9/L    RBC Count 4.19 3.8 - 5.2 10e12/L    Hemoglobin 12.9 11.7 - 15.7 g/dL    Hematocrit 39.5 35.0 - 47.0 %    MCV 94 78 - 100 fl    MCH 30.8 26.5 - 33.0 pg    MCHC 32.7 31.5 - 36.5 g/dL    RDW 13.5 10.0 - 15.0 %    Platelet Count 268 150 - 450 10e9/L       If you have any  questions or concerns, please call the clinic at the number listed above.       Sincerely,        Brittnee Sharma MD

## 2019-10-09 NOTE — LETTER
October 9, 2019      Keiko Kimball  [address] [facility] [address]  [place] [address]        Dear ,    We are writing to inform you of your test results.    Your cbc is normal, showing no anemia or signs of infection.     Resulted Orders   CBC with platelets   Result Value Ref Range    WBC 6.1 4.0 - 11.0 10e9/L    RBC Count 4.19 3.8 - 5.2 10e12/L    Hemoglobin 12.9 11.7 - 15.7 g/dL    Hematocrit 39.5 35.0 - 47.0 %    MCV 94 78 - 100 fl    MCH 30.8 26.5 - 33.0 pg    MCHC 32.7 31.5 - 36.5 g/dL    RDW 13.5 10.0 - 15.0 %    Platelet Count 268 150 - 450 10e9/L       If you have any questions or concerns, please call the clinic at the number listed above.       Sincerely,        Brittnee Sharma MD

## 2019-10-09 NOTE — PROGRESS NOTES
Prior to immunization administration, verified patients identity using patient s name and date of birth. Please see Immunization Activity for additional information.     Screening Questionnaire for Adult Immunization    Are you sick today?   No   Do you have allergies to medications, food, a vaccine component or latex?   Yes   Have you ever had a serious reaction after receiving a vaccination?   No   Do you have a long-term health problem with heart disease, lung disease, asthma, kidney disease, metabolic disease (e.g. diabetes), anemia, or other blood disorder?   No   Do you have cancer, leukemia, HIV/AIDS, or any other immune system problem?   No   In the past 3 months, have you taken medications that affect  your immune system, such as prednisone, other steroids, or anticancer drugs; drugs for the treatment of rheumatoid arthritis, Crohn s disease, or psoriasis; or have you had radiation treatments?   No   Have you had a seizure, or a brain or other nervous system problem?   No   During the past year, have you received a transfusion of blood or blood     products, or been given immune (gamma) globulin or antiviral drug?   No   For women: Are you pregnant or is there a chance you could become        pregnant during the next month?   No   Have you received any vaccinations in the past 4 weeks?   No     Immunization questionnaire was positive for at least one answer.  Notified Dr. Sharma.        Per orders of Dr. Sharma, injection of B-12 given by Lisa A. Magill, CMA. Patient instructed to remain in clinic for 15 minutes afterwards, and to report any adverse reaction to me immediately.       Screening performed by Lisa A. Magill, CMA on 10/9/2019 at 11:28 AM.

## 2019-10-10 ENCOUNTER — TELEPHONE (OUTPATIENT)
Dept: FAMILY MEDICINE | Facility: CLINIC | Age: 69
End: 2019-10-10

## 2019-10-10 LAB
ALBUMIN SERPL-MCNC: 3.9 G/DL (ref 3.4–5)
ALP SERPL-CCNC: 115 U/L (ref 40–150)
ALT SERPL W P-5'-P-CCNC: 15 U/L (ref 0–50)
ANION GAP SERPL CALCULATED.3IONS-SCNC: 11 MMOL/L (ref 3–14)
AST SERPL W P-5'-P-CCNC: 16 U/L (ref 0–45)
BILIRUB SERPL-MCNC: 0.7 MG/DL (ref 0.2–1.3)
BUN SERPL-MCNC: 13 MG/DL (ref 7–30)
CALCIUM SERPL-MCNC: 8.8 MG/DL (ref 8.5–10.1)
CHLORIDE SERPL-SCNC: 104 MMOL/L (ref 94–109)
CO2 SERPL-SCNC: 22 MMOL/L (ref 20–32)
CREAT SERPL-MCNC: 0.53 MG/DL (ref 0.52–1.04)
DEPRECATED CALCIDIOL+CALCIFEROL SERPL-MC: 30 UG/L (ref 20–75)
GFR SERPL CREATININE-BSD FRML MDRD: >90 ML/MIN/{1.73_M2}
GLUCOSE SERPL-MCNC: 84 MG/DL (ref 70–99)
POTASSIUM SERPL-SCNC: 4.4 MMOL/L (ref 3.4–5.3)
PROT SERPL-MCNC: 6.2 G/DL (ref 6.8–8.8)
SODIUM SERPL-SCNC: 137 MMOL/L (ref 133–144)

## 2019-10-10 NOTE — TELEPHONE ENCOUNTER
10/10/2019    Patient called to ask  if she thinks she should change her Cannabis prescription or not. Zachary had already gone home at the time of call.Patient stated at last appt they discussed her current prescription.Now patient would like to know if she should change the Cannabis to another combination with Cannabis. Patient would like to be contacted at ph:[phone]    Chelsey Velazquez -Patient Representative

## 2019-10-11 DIAGNOSIS — M24.411 CHRONIC DISLOCATION OF SHOULDER, RIGHT: ICD-10-CM

## 2019-10-11 DIAGNOSIS — G89.4 CHRONIC PAIN SYNDROME: ICD-10-CM

## 2019-10-11 RX ORDER — DULOXETIN HYDROCHLORIDE 30 MG/1
30 CAPSULE, DELAYED RELEASE ORAL DAILY
Qty: 90 CAPSULE | Refills: 1 | Status: SHIPPED | OUTPATIENT
Start: 2019-10-11 | End: 2020-04-30 | Stop reason: SINTOL

## 2019-10-11 NOTE — TELEPHONE ENCOUNTER
I don't prescribe medical cannabis, the dispensery does all of this. She needs to contact [email or web address] in [place].

## 2019-10-11 NOTE — TELEPHONE ENCOUNTER
10/11/2019    Patient returned phone call and was informed to call [facility] about inquiry.    Chelsey Velazquez -Patient Representative

## 2019-10-14 NOTE — PROGRESS NOTES
NC visit--adjusted orthosis to pt satisfaction.  
Hide Panoxyl Products: No
Action 1: Continue
Detail Level: Zone

## 2019-10-28 ENCOUNTER — TELEPHONE (OUTPATIENT)
Dept: FAMILY MEDICINE | Facility: CLINIC | Age: 69
End: 2019-10-28

## 2019-10-28 NOTE — TELEPHONE ENCOUNTER
Pt calling into clinic  States her new medication she took for sleep caused her to feel wired    Only new med was for pain (duloxetine, Cymbalta)  Pt thought it was for sleep and it did not calm her to she threw it away  She took the med at bedtime  (recently sent in refill for duloxetine)    uses cannabis oil and it causes some sleepiness, but wakes after few hours    Offered contradictory statements about sleep  One time she went to bed at midnight and didn't fall asleep until 5 am, then next she said it took her 1 1/2 hrs to fall asleep  Once asleep she sleeps about 2 hrs  States she does not nap during the day, then later said she felt her head drop down when sitting down    CB#   Telephone Information:   Mobile [phone]       Please address   Sleep   Use of duloxetine for pain

## 2019-10-29 NOTE — TELEPHONE ENCOUNTER
I have reviewed her chart back to 2015. She has tried and did not like, trazadone, restoril and flexeril for sleep. It is not easy to recommend a new sleep aid, due to hx of failures and concerns about sedation and her age.  Has she tried ES tylenol? Or melatonin?

## 2019-11-13 ENCOUNTER — ALLIED HEALTH/NURSE VISIT (OUTPATIENT)
Dept: FAMILY MEDICINE | Facility: CLINIC | Age: 69
End: 2019-11-13
Payer: MEDICARE

## 2019-11-13 DIAGNOSIS — E53.8 VITAMIN B12 DEFICIENCY WITHOUT ANEMIA: Primary | ICD-10-CM

## 2019-11-13 PROCEDURE — 96372 THER/PROPH/DIAG INJ SC/IM: CPT

## 2019-11-13 PROCEDURE — 99207 ZZC NO CHARGE NURSE ONLY: CPT

## 2019-11-13 RX ADMIN — CYANOCOBALAMIN 1000 MCG: 1000 INJECTION, SOLUTION INTRAMUSCULAR; SUBCUTANEOUS at 10:06

## 2019-12-04 ENCOUNTER — ALLIED HEALTH/NURSE VISIT (OUTPATIENT)
Dept: FAMILY MEDICINE | Facility: CLINIC | Age: 69
End: 2019-12-04
Payer: MEDICARE

## 2019-12-04 DIAGNOSIS — E53.8 VITAMIN B12 DEFICIENCY WITHOUT ANEMIA: Primary | ICD-10-CM

## 2019-12-04 PROCEDURE — 99207 ZZC NO CHARGE NURSE ONLY: CPT

## 2019-12-04 NOTE — PROGRESS NOTES
"RN spoke to patient regarding B12 injection today   Patient is upset that she got a ride to clinic today to receive B12 injection and is unable to get this today   Explained that clinic order states every 30 days and provider Haase, Susan Rachele APRN CNP verbally stated today that it is to early for injection - patient states \"I will  Just give them at home by myself than\" \"so I can give them when I want\"   RN apologized for the inconvenience and patient got up and walked out of the exam room     MA spoke to pcp who advised it was to early to receive B12 injection today - last received on 11/13/19 and order states every 30 days     Ada Esposito Registered Nurse   [facility]     "

## 2019-12-11 ENCOUNTER — TELEPHONE (OUTPATIENT)
Dept: FAMILY MEDICINE | Facility: CLINIC | Age: 69
End: 2019-12-11

## 2019-12-11 NOTE — TELEPHONE ENCOUNTER
Patient calling because she is upset that she went to the [facility] clinic on 12/4/2019 to get her B12 injection and was advised she was too early and could not get it.  Last injection was 11/13/2019.  Patient states that she needs her B12 to help give her energy because she has not been eating very well because of not feeling the greatest.  Sometimes she needs it sooner than 30 days.  She is requesting the directions for her rx for B12 injections be changed so she can get her injections earlier than 30 days if needed. (she requested it to say every 15-30 days)  Please advise further.    Shruthi Russo RN

## 2019-12-12 NOTE — TELEPHONE ENCOUNTER
Please call pt. We cannot increase the dose based on fatigue, but rather on her labs. If her levels become to low, we will have to increase the dose. Coming in early is increasing the dosage. I can order this lab, it was looking good last time she had this checked.  Since she is feeling tired, I will order the lab test.

## 2019-12-13 NOTE — TELEPHONE ENCOUNTER
Patient notified.  Not happy about this.  Will wait until April 2020 and then see if Dr. Sharma will switch it so she can just give the injections to herself.    Shruthi Russo RN

## 2019-12-16 ENCOUNTER — TELEPHONE (OUTPATIENT)
Dept: FAMILY MEDICINE | Facility: CLINIC | Age: 69
End: 2019-12-16

## 2019-12-16 DIAGNOSIS — R63.4 WEIGHT LOSS: ICD-10-CM

## 2019-12-16 DIAGNOSIS — Z98.84 HISTORY OF ROUX-EN-Y GASTRIC BYPASS: ICD-10-CM

## 2019-12-16 DIAGNOSIS — E53.8 VITAMIN B12 DEFICIENCY WITHOUT ANEMIA: Primary | ICD-10-CM

## 2019-12-16 RX ORDER — CYANOCOBALAMIN 1000 UG/ML
1000 INJECTION, SOLUTION INTRAMUSCULAR; SUBCUTANEOUS
Status: DISCONTINUED | OUTPATIENT
Start: 2019-12-18 | End: 2020-01-14

## 2019-12-16 NOTE — TELEPHONE ENCOUNTER
Patient has a MA appointment at  on Wednesday for B-12 injection but order on her chart has . Please enter in a new order for patient or advise she needs to wait until seen by primary provider.  Stephanie Burciaga

## 2019-12-18 ENCOUNTER — ALLIED HEALTH/NURSE VISIT (OUTPATIENT)
Dept: FAMILY MEDICINE | Facility: CLINIC | Age: 69
End: 2019-12-18
Payer: MEDICARE

## 2019-12-18 DIAGNOSIS — E53.8 VITAMIN B12 DEFICIENCY WITHOUT ANEMIA: Primary | ICD-10-CM

## 2019-12-18 PROCEDURE — 99207 ZZC NO CHARGE NURSE ONLY: CPT

## 2019-12-18 PROCEDURE — 96372 THER/PROPH/DIAG INJ SC/IM: CPT

## 2019-12-18 RX ADMIN — CYANOCOBALAMIN 1000 MCG: 1000 INJECTION, SOLUTION INTRAMUSCULAR; SUBCUTANEOUS at 10:19

## 2019-12-18 NOTE — PROGRESS NOTES
The following medication was given:     MEDICATION: Vitamin B12  1000mcg  ROUTE: IM  SITE: Deltoid - Right  DOSE: 1 mL  LOT #: [id number]  :  [employer] [place]  EXPIRATION DATE:  4/2021  NDC#: 5766-3391-01  Joanna Aiken CMA

## 2020-01-13 ENCOUNTER — TELEPHONE (OUTPATIENT)
Dept: FAMILY MEDICINE | Facility: CLINIC | Age: 70
End: 2020-01-13

## 2020-01-13 DIAGNOSIS — E53.8 VITAMIN B12 DEFICIENCY WITHOUT ANEMIA: Primary | ICD-10-CM

## 2020-01-13 DIAGNOSIS — Z12.11 SPECIAL SCREENING FOR MALIGNANT NEOPLASMS, COLON: ICD-10-CM

## 2020-01-13 PROCEDURE — 82274 ASSAY TEST FOR BLOOD FECAL: CPT | Performed by: FAMILY MEDICINE

## 2020-01-13 NOTE — TELEPHONE ENCOUNTER
Patient has apt scheduled for 1/15/20 for B12 injections. 28 days from previous not due until 1/17/20. Patient only has a ride on 1/15/20 and not able to get the rest of the week.    is it ok for patient to get 2 days early?    Patient is up set that a few days before always is trouble to get in and she is planning on discussing with  to start giving own injections as shes never had problems getting it early int he past.     Informed patient  out of the office today and will follow up with patient tomorrow. Please call back if ok to get Wednesday or if needing to schedule at later date.  Thanks!

## 2020-01-14 LAB — HEMOCCULT STL QL IA: NEGATIVE

## 2020-01-14 RX ORDER — CYANOCOBALAMIN 1000 UG/ML
1000 INJECTION, SOLUTION INTRAMUSCULAR; SUBCUTANEOUS
Status: DISCONTINUED | OUTPATIENT
Start: 2020-01-14 | End: 2020-08-12

## 2020-01-14 NOTE — TELEPHONE ENCOUNTER
Spoke to patient.     Patient upset that she is being told she has to wait 30 days. She said she may start having to give herself the shot. She would like the order to be changed that she can get as needed or every 14 days.     Please advise.       Pt expressed understanding and acceptance of the plan for tomorrow.   Pt had no further questions at this time.  Advised can call back to clinic at any time with concerns.     Sruthi Maddox RN Flex

## 2020-01-14 NOTE — TELEPHONE ENCOUNTER
I changed it to every 21 days, but to be clear, I want her to have it closer to 30 days, as much as possible.  Thank you

## 2020-01-14 NOTE — TELEPHONE ENCOUNTER
Called the Pt and relayed the below information. She did express understanding. Accepting of plan of care.     Heather Rubio RN -- [facility]

## 2020-01-15 ENCOUNTER — ALLIED HEALTH/NURSE VISIT (OUTPATIENT)
Dept: FAMILY MEDICINE | Facility: CLINIC | Age: 70
End: 2020-01-15
Payer: MEDICARE

## 2020-01-15 DIAGNOSIS — E53.8 VITAMIN B12 DEFICIENCY WITHOUT ANEMIA: Primary | ICD-10-CM

## 2020-01-15 PROCEDURE — 96372 THER/PROPH/DIAG INJ SC/IM: CPT

## 2020-01-15 PROCEDURE — 99207 ZZC NO CHARGE NURSE ONLY: CPT

## 2020-01-15 RX ADMIN — CYANOCOBALAMIN 1000 MCG: 1000 INJECTION, SOLUTION INTRAMUSCULAR; SUBCUTANEOUS at 10:09

## 2020-01-23 ENCOUNTER — TELEPHONE (OUTPATIENT)
Dept: FAMILY MEDICINE | Facility: CLINIC | Age: 70
End: 2020-01-23

## 2020-01-24 NOTE — TELEPHONE ENCOUNTER
"Dr. Sharma,    Patient is wondering if you have a \"Preference\" to a provider at the [facility] clinic for patient to see if you are not available to see in [place].    Please advise.    Shruthi Russo RN    "

## 2020-02-05 ENCOUNTER — ALLIED HEALTH/NURSE VISIT (OUTPATIENT)
Dept: FAMILY MEDICINE | Facility: CLINIC | Age: 70
End: 2020-02-05
Payer: MEDICARE

## 2020-02-05 DIAGNOSIS — E53.8 VITAMIN B12 DEFICIENCY WITHOUT ANEMIA: Primary | ICD-10-CM

## 2020-02-05 NOTE — PROGRESS NOTES
Clinic Administered Medication Documentation    MEDICATION LIST:   Injectable Medication Documentation    Patient was given Cyanocobalamin (B-12). Prior to medication administration, verified patients identity using patient s name and date of birth. Please see MAR and medication order for additional information. Patient instructed to remain in clinic for 15 minutes.      Was entire vial of medication used? Yes  Vial/Syringe: Single dose vial  Expiration Date:  05/2021  Was this medication supplied by the patient? No   Seema Walker, CMA

## 2020-03-06 ENCOUNTER — ALLIED HEALTH/NURSE VISIT (OUTPATIENT)
Dept: FAMILY MEDICINE | Facility: CLINIC | Age: 70
End: 2020-03-06
Payer: MEDICARE

## 2020-03-06 DIAGNOSIS — E53.8 VITAMIN B12 DEFICIENCY WITHOUT ANEMIA: Primary | ICD-10-CM

## 2020-03-06 PROCEDURE — 99207 ZZC NO CHARGE NURSE ONLY: CPT

## 2020-03-06 PROCEDURE — 96372 THER/PROPH/DIAG INJ SC/IM: CPT

## 2020-03-06 RX ADMIN — CYANOCOBALAMIN 1000 MCG: 1000 INJECTION, SOLUTION INTRAMUSCULAR; SUBCUTANEOUS at 11:31

## 2020-03-06 NOTE — PROGRESS NOTES
Clinic Administered Medication Documentation      Injectable Medication Documentation    Patient was given Cyanocobalamin (B-12). Prior to medication administration, verified patients identity using patient s name and date of birth. Please see MAR and medication order for additional information. Patient instructed to remain in clinic for 15 minutes.      Was entire vial of medication used? Yes  Vial/Syringe: Single dose vial  Expiration Date:  05/2021  Was this medication supplied by the patient? No     Jenna Ulloa CMA

## 2020-04-02 ENCOUNTER — TELEPHONE (OUTPATIENT)
Dept: FAMILY MEDICINE | Facility: CLINIC | Age: 70
End: 2020-04-02

## 2020-04-08 ENCOUNTER — VIRTUAL VISIT (OUTPATIENT)
Dept: FAMILY MEDICINE | Facility: CLINIC | Age: 70
End: 2020-04-08
Payer: MEDICARE

## 2020-04-08 ENCOUNTER — ALLIED HEALTH/NURSE VISIT (OUTPATIENT)
Dept: FAMILY MEDICINE | Facility: CLINIC | Age: 70
End: 2020-04-08
Payer: MEDICARE

## 2020-04-08 VITALS — SYSTOLIC BLOOD PRESSURE: 98 MMHG | DIASTOLIC BLOOD PRESSURE: 80 MMHG

## 2020-04-08 DIAGNOSIS — G90.511 COMPLEX REGIONAL PAIN SYNDROME TYPE 1 OF RIGHT UPPER EXTREMITY: ICD-10-CM

## 2020-04-08 DIAGNOSIS — I10 ESSENTIAL HYPERTENSION, BENIGN: Primary | ICD-10-CM

## 2020-04-08 DIAGNOSIS — K27.9 PEPTIC ULCER: ICD-10-CM

## 2020-04-08 DIAGNOSIS — E53.8 VITAMIN B12 DEFICIENCY WITHOUT ANEMIA: Primary | ICD-10-CM

## 2020-04-08 DIAGNOSIS — R11.11 INTRACTABLE VOMITING WITHOUT NAUSEA: ICD-10-CM

## 2020-04-08 DIAGNOSIS — B34.9 VIRAL INFECTION: ICD-10-CM

## 2020-04-08 PROCEDURE — 99443 ZZC PHYSICIAN TELEPHONE EVALUATION 21-30 MIN: CPT | Performed by: FAMILY MEDICINE

## 2020-04-08 PROCEDURE — 96372 THER/PROPH/DIAG INJ SC/IM: CPT

## 2020-04-08 PROCEDURE — 99207 ZZC NO CHARGE NURSE ONLY: CPT

## 2020-04-08 RX ORDER — LISINOPRIL 10 MG/1
10 TABLET ORAL DAILY
Qty: 90 TABLET | Refills: 1 | Status: SHIPPED | OUTPATIENT
Start: 2020-04-08 | End: 2020-08-11

## 2020-04-08 RX ORDER — OMEPRAZOLE 40 MG/1
40 CAPSULE, DELAYED RELEASE ORAL
Qty: 180 CAPSULE | Refills: 3 | Status: SHIPPED | OUTPATIENT
Start: 2020-04-08 | End: 2020-08-11

## 2020-04-08 RX ADMIN — CYANOCOBALAMIN 1000 MCG: 1000 INJECTION, SOLUTION INTRAMUSCULAR; SUBCUTANEOUS at 13:13

## 2020-04-08 NOTE — PROGRESS NOTES
"Subjective     Keiko Kimball is a 69 year old female who is being evaluated via a billable telephone visit.      The patient has been notified of following:     \"This telephone visit will be conducted via a call between you and your physician/provider. We have found that certain health care needs can be provided without the need for a physical exam.  This service lets us provide the care you need with a short phone conversation.  If a prescription is necessary we can send it directly to your pharmacy.  If lab work is needed we can place an order for that and you can then stop by our lab to have the test done at a later time.    Telephone visits are billed at different rates depending on your insurance coverage. During this emergency period, for some insurers they may be billed the same as an in-person visit.  Please reach out to your insurance provider with any questions.    If during the course of the call the physician/provider feels a telephone visit is not appropriate, you will not be charged for this service.\"    Patient has given verbal consent for Telephone visit?  Yes    Keiko Kimball complains of   Chief Complaint   Patient presents with     Telephone     Weight Loss     Referral     to get scooter       ALLERGIES  Actonel [risedronate sodium]; Fentanyl; Azithromycin; Celebrex [celecoxib]; Asa [aspirin]; Boniva [ibandronate sodium]; Codeine; Dilaudid cough [dilaudid cough]; Nsaids; Tramadol; Vicodin [hydrocodone-acetaminophen]; Vistaril; Codeine; Hydrocodone; and Sulfa drugs    Patient having concerns about weight loss. She believes she is stabilizing now, but is still concerned about it. Wants to discuss what to do. today's weight is 125#. Is going ok now. Needing refill on prilosec, working well  Still vomits from time to time.    Would like to let you know how ill she was in February as well. Felt her throat and chest, did not feel like she wanted to throw up. Was feverish and had pain in throat, " chest and cough. Woke up suddenly with this. Then her breathing scared her and almost called 911, had amox, and started this on the 3 rd day. Started feeling better on the 4 th day. Was able to eat soup on the 5th day. Was unable to leave her house for days. It did not go away quickly, very tired and weak for a few weeks, the cough lingered a bit and she thinks the amox helped it. Drank a lot of V8 juice. 5 other people were also sick, senior building. No one was tested and everyone is better now. Is not keeping social distance and doing ok.  No one is allowed in the community room, so going out side. Is trying to stay social and tan her skin for vit D when the weather is nice.    Scooter would helpful, because she cannot stand for very long. Bone on bone arthritis in the knees and cannot have surgery. Her feet are also bad. Her screws will not  her body due to her bones are soft, all per pt, so cannot get surgery for her other joints. Has not fallen recently. Is able to walk, but has to stop often. Very difficult to get to the grocer store.     Right arm is in the sling, gets a lot of pain, does not allow the pain to ovver take her, worse at night when she relaxes. No longer taking the medical cannabis, has had to use her money for food. Does have a bottle at home yet(if she she needs it), and used the cream daily. She likes how this is working for her, does not want to make any changes.  Patient presents for evaluation of hypertension.  She indicates that she is feeling well and denies any symptoms referable to her elevated blood pressure. Specifically denies chest pain, palpitations, dyspnea, orthopnea, PND or peripheral edema. Current medication regimen is as listed below. Patient denies any side effects of medication.                         PROBLEMS TO ADD ON...    BP Readings from Last 3 Encounters:   10/09/19 102/68   05/07/19 110/78   04/17/19 118/82    Wt Readings from Last 3 Encounters:   10/09/19  57.2 kg (126 lb)   04/17/19 58.4 kg (128 lb 11.2 oz)   11/09/18 61.3 kg (135 lb 3.2 oz)                    Reviewed and updated as needed this visit by Provider         Review of Systems   ROS COMP: Constitutional, HEENT, cardiovascular, pulmonary, gi and gu systems are negative, except as otherwise noted.       Objective   Reported vitals:  There were no vitals taken for this visit.   alert and no distress  Psych: Alert and oriented times 3; coherent speech, normal   rate and volume, able to articulate logical thoughts, able   to abstract reason, no tangential thoughts, no hallucinations   or delusions  Her affect is normal     Diagnostic Test Results:  Labs reviewed in Epic        Assessment/Plan:  1. Essential hypertension, benign  Well controlled. Will refill.   - lisinopril (ZESTRIL) 10 MG tablet; Take 1 tablet (10 mg) by mouth daily  Dispense: 90 tablet; Refill: 1    2. Peptic ulcer  Cont higher dose, working well  - omeprazole (PRILOSEC) 40 MG DR capsule; Take 1 capsule (40 mg) by mouth 2 times daily Take 30-60 minutes before a meal.  Dispense: 180 capsule; Refill: 3    3. Complex regional pain syndrome type 1 of right upper extremity  Pt off narcotics and using as needed medical cannabis, but mostly topical, has been stable, shoulder still causing her a lot of pain, same with knees and feet, recommend she use scooter. She cannot use walker due to right shoulder dislocated chronically and in brace    4. Intractable vomiting without nausea, vomiting of unspecified type  fairly stable, but still has this contidion    5. Viral infection  Resolved, perhaps had COVID, unable to test at this time      Return in about 4 months (around 8/8/2020) for wellness exam, For chronic pain check, BP and refills.      Phone call duration:  21 minutes    Brittnee Sharma MD

## 2020-04-08 NOTE — PROGRESS NOTES
Clinic Administered Medication Documentation      Injectable Medication Documentation    Patient was given Cyanocobalamin (B-12). Prior to medication administration, verified patients identity using patient s name and date of birth. Please see MAR and medication order for additional information. Patient instructed to remain in clinic for 15 minutes.      Was entire vial of medication used? Yes  Vial/Syringe: Single dose vial  Expiration Date:  07/2021  Was this medication supplied by the patient? No   NDC: 3703-8946-73  LOT: [id number]  Seema Walker [facility]

## 2020-04-16 ENCOUNTER — TELEPHONE (OUTPATIENT)
Dept: FAMILY MEDICINE | Facility: CLINIC | Age: 70
End: 2020-04-16

## 2020-04-16 NOTE — TELEPHONE ENCOUNTER
General Call:   Who is calling:  Keiko Ortega PT  Reason for Call:  Pending orders for wheelchair/scooter  What are your questions or concerns:  Waiting on approval for wheelchair/scooter that was discussed with Brittnee Sharma on 04/08/2020.  Date of last appointment with provider: 04/08/2020  Okay to leave a detailed message:Yes at Cell number on file:    Telephone Information:   Mobile [phone]

## 2020-04-16 NOTE — TELEPHONE ENCOUNTER
Spoke with [facility] and they received patients information (rx and chart notes) for the scooter on 4/9/2020 but said that they had not reviewed it yet and that there were a few other patients in front of her that needed to be done first.  She said that it could take up to 120 days to get through with the scooter order.  They will contact the patient or the provider if more information is needed.    Shruthi Russo RN

## 2020-04-22 NOTE — TELEPHONE ENCOUNTER
Do you want in person with someone, or would video visit work for face to face    ,Livia Sanchez/

## 2020-04-22 NOTE — TELEPHONE ENCOUNTER
Patient will need a Face to Face office visit in order for us to complete the necessary paperwork for the scooter.  Please schedule patient for a Face to Face office visit.    Information that will be need to be documented for the face to face visit:    1.  [employer] Order:  I have the form on my desk (Shruthi)  2.  DME order for the Scooter: Clarifying if patient is needing a Power Wheel Chair or a Power Operating Vehicle (Scooter)?  3.  Complete documentation of patients diagnosis  4.  Attach the following documentation:    A.  The patient has a mobility limitation that significantly impairs her ability to participate in one or more mobility-related activities of daily living (MRADL's) in the home such as toileting, feeding, dressing, grooming, and bathing.  B.  Use of a power wheelchair will significantly improve the patients ability to participate in MRADL's in the home.  C.  The mobility deficit cannot be sufficiently and safely resolved by the use of an appropriately fitted cane or walker.  D.  The patient does not have sufficient upper extremity function to self-propel an optimally-configured manual wheelchair in the home to perform MRADL's during a typical day.  E.  The patient has the mental and physical capabilities to safely operate the power wheelchair that is provided.    Shruthi Russo RN

## 2020-04-23 NOTE — TELEPHONE ENCOUNTER
LVM for patient to return call, please help schedule in person appointment today or tomorrow with Dr Garcia or Salomón Sanchez/

## 2020-04-28 NOTE — PROGRESS NOTES
"Pre-Visit Planning     Future Appointments   Date Time Provider Department Center   4/30/2020 11:00 AM Brittnee Sharma MD [facility]     Arrival Time for this Appointment: 10:40 AM   Appointment Notes for this encounter:   face 2 face needed for Scooter- per Dr Sharma    Questionnaires Reviewed/Assigned  Additional questionnaires assigned   Per chart review, patient would be overdue for Cymbalta, however it looks like she never used any of her 5 refills. Pt reported not taking 4/8/2020, she states it makes her \"sick to the stomach, causes vomiting,\" please address with patient & update med list.      Patient preferred phone number: [phone]    Spoke to patient via phone. Patient does not have additional questions or concerns.        Visit is not preventive.    Health Maintenance Due   Topic Date Due     ANNUAL REVIEW OF HM ORDERS  1950     MEDICARE ANNUAL WELLNESS VISIT  01/09/2016     MAMMO SCREENING  11/01/2018     URINE DRUG SCREEN  08/07/2019     PHQ-2  01/01/2020     DTAP/TDAP/TD IMMUNIZATION (3 - Td) 01/01/2020     FALL RISK ASSESSMENT  04/17/2020     Patient is due for:  mammogram, preventive care visit and urine drug screen, TDAP  No appointment needed. Preventive testing, procedures will be delayed until after COVID 19 restrictions are listed.     MyChart  Patient is not active on adQ. Did not address    Questionnaire Review   Advised patient to arrive early in order to complete questionnaires.      Neeta Gamez, RN, BSN    "

## 2020-04-30 ENCOUNTER — OFFICE VISIT (OUTPATIENT)
Dept: FAMILY MEDICINE | Facility: CLINIC | Age: 70
End: 2020-04-30
Payer: MEDICARE

## 2020-04-30 VITALS
WEIGHT: 127.4 LBS | DIASTOLIC BLOOD PRESSURE: 80 MMHG | HEART RATE: 86 BPM | RESPIRATION RATE: 16 BRPM | SYSTOLIC BLOOD PRESSURE: 126 MMHG | OXYGEN SATURATION: 96 % | BODY MASS INDEX: 27.81 KG/M2 | TEMPERATURE: 98.1 F

## 2020-04-30 DIAGNOSIS — Z23 ENCOUNTER FOR IMMUNIZATION: ICD-10-CM

## 2020-04-30 DIAGNOSIS — G90.511 COMPLEX REGIONAL PAIN SYNDROME TYPE 1 OF RIGHT UPPER EXTREMITY: ICD-10-CM

## 2020-04-30 DIAGNOSIS — M72.2 PLANTAR FASCIITIS: ICD-10-CM

## 2020-04-30 DIAGNOSIS — S32.000S COMPRESSION FRACTURE OF LUMBAR VERTEBRA, UNSPECIFIED LUMBAR VERTEBRAL LEVEL, SEQUELA: ICD-10-CM

## 2020-04-30 DIAGNOSIS — G89.4 CHRONIC PAIN SYNDROME: ICD-10-CM

## 2020-04-30 DIAGNOSIS — M17.0 PRIMARY OSTEOARTHRITIS OF BOTH KNEES: ICD-10-CM

## 2020-04-30 DIAGNOSIS — M24.411 CHRONIC DISLOCATION OF SHOULDER, RIGHT: Primary | ICD-10-CM

## 2020-04-30 PROCEDURE — 99214 OFFICE O/P EST MOD 30 MIN: CPT | Mod: 25 | Performed by: FAMILY MEDICINE

## 2020-04-30 PROCEDURE — 96372 THER/PROPH/DIAG INJ SC/IM: CPT | Performed by: FAMILY MEDICINE

## 2020-04-30 RX ADMIN — CYANOCOBALAMIN 1000 MCG: 1000 INJECTION, SOLUTION INTRAMUSCULAR; SUBCUTANEOUS at 11:31

## 2020-04-30 NOTE — PROGRESS NOTES
Prior to immunization administration, verified patients identity using patient s name and date of birth. Please see Immunization Activity for additional information.     Screening Questionnaire for Adult Immunization    Are you sick today?   No   Do you have allergies to medications, food, a vaccine component or latex?   Yes   Have you ever had a serious reaction after receiving a vaccination?   No   Do you have a long-term health problem with heart, lung, kidney, or metabolic disease (e.g., diabetes), asthma, a blood disorder, no spleen, complement component deficiency, a cochlear implant, or a spinal fluid leak?  Are you on long-term aspirin therapy?   No   Do you have cancer, leukemia, HIV/AIDS, or any other immune system problem?   No   Do you have a parent, brother, or sister with an immune system problem?   No   In the past 3 months, have you taken medications that affect  your immune system, such as prednisone, other steroids, or anticancer drugs; drugs for the treatment of rheumatoid arthritis, Crohn s disease, or psoriasis; or have you had radiation treatments?   No   Have you had a seizure, or a brain or other nervous system problem?   No   During the past year, have you received a transfusion of blood or blood    products, or been given immune (gamma) globulin or antiviral drug?   No   For women: Are you pregnant or is there a chance you could become       pregnant during the next month?   No   Have you received any vaccinations in the past 4 weeks?   No     Immunization questionnaire was positive for at least one answer.  Notified Dr. Sharma.        Per orders of Dr. Sharma, injection of B-12 injection given by Lisa A. Magill, CMA. Patient instructed to remain in clinic for 15 minutes afterwards, and to report any adverse reaction to me immediately.       Screening performed by Lisa A. Magill, CMA on 4/30/2020 at 11:33 AM.

## 2020-04-30 NOTE — PROGRESS NOTES
Clinic Administered Medication Documentation      Injectable Medication Documentation    Patient was given Cyanocobalamin (B-12). Prior to medication administration, verified patients identity using patient s name and date of birth. Please see MAR and medication order for additional information. Patient instructed to report any adverse reaction to staff immediately .      Was entire vial of medication used? Yes  Vial/Syringe: Single dose vial  Expiration Date:  07/2021  Was this medication supplied by the patient? No     Lisa Magill, CMA

## 2020-04-30 NOTE — PROGRESS NOTES
Subjective     Keiko Kimball is a 69 year old female who presents to clinic today for the following health issues:    HPI   FACE TO FACE VISIT NEEDED FOR SCOOTER:   USES DARTS FOR SHOPPING PRIOR TO covd. Uses her cart, which is much like a walker, but has a sack in the front for her groceries. She cannot use her right arm at all. Has fallen in the past and broke her femor. Has to walk 4 blocks, and has to stop often to rest. Uses her cart for stability. Gets pretty winded and is exhausing,   Unable to use right arm at all, has permitnant shoulder dislocation. Her left knee/leg is not able to straigthen, has terrible arthritis in both knees.   Severe kyphosis, unable to stand up straight.   Gets around the home with holding furniture, walls, counter tops. Not fallen in the house. Able to get dress and shower on her own. Able to cook, but has to sit down every 5 min, due to back, knees, everything.   Uses a cane in the home and a reacher.   Would be unable to propel her self in a WC, due to right arm not mobile, left hand now huriting/weakness due to arthritis and over use, since only one handed for the most part    Right shoulder has been had surgery 7 times, wrist, hand , upper arm. Elbow is arificual, the arm is in L shape and unable to straigthen or use.  Lives in senior building, 2 bedroom, elevator. Has grab bars in bathroom, unable to get out of tub, has to take shower only.   As to go over grass, sidewalk and gravel to get to the store.   Can use darts at times, when they are up and running, diane in bad weather or anything she can. Only uses cart to the store if MUST, dart is not running due to pandemic.   Has a cleaning lady that comes in every 2 weeks, she will also chop up her food, like onions for later.    Would like a scooter, feels more secure and more balanced.    Hx of severe heel pain, chronic plantar fascitis, hx of 1 surgery in the past, but did not help.    PROBLEMS TO ADD ON...weight is stable,  hx of chronic vomitting, eats well cut up food and ground meats, no choking or other concerns today    BP Readings from Last 3 Encounters:   04/30/20 126/80   04/08/20 98/80   10/09/19 102/68    Wt Readings from Last 3 Encounters:   04/30/20 57.8 kg (127 lb 6.4 oz)   10/09/19 57.2 kg (126 lb)   04/17/19 58.4 kg (128 lb 11.2 oz)                    Reviewed and updated as needed this visit by Provider         Review of Systems   ROS COMP: Constitutional, HEENT, cardiovascular, pulmonary, gi and gu systems are negative, except as otherwise noted.      Objective    /80 (BP Location: Left arm, Patient Position: Sitting, Cuff Size: Adult Regular)   Pulse 86   Temp 98.1  F (36.7  C) (Oral)   Resp 16   Wt 57.8 kg (127 lb 6.4 oz)   SpO2 96%   BMI 27.81 kg/m    Body mass index is 27.81 kg/m .  Physical Exam   GENERAL: healthy, alert and no distress  EYES: Eyes grossly normal to inspection,  and conjunctivae and sclerae normal  MS: no gross musculoskeletal defects noted, no edema  SKIN: no suspicious lesions or rashes, is deeply tanned  NEURO: right arm is not mobile, bent at 90 degrees, severe kyphosis when stands up  and mentation intact  PSYCH: mentation appears normal, affect normal/bright, talking easily, no coughing or SOB  Limited exam today due to COVID pandemic  Diagnostic Test Results:  Labs reviewed in Epic        Assessment & Plan     1. Chronic dislocation of shoulder, right  A.  The patient has a mobility limitation that significantly impairs her ability to participate in one or more mobility-related activities of daily living (MRADL's) in the home such as toileting, feeding, dressing, grooming, and bathing. She is unable to get out of tub, is needing help preparing food, is able to manage grooming, dressing, toileting  B.  Use of a power wheelchair will significantly improve the patients ability to participate in MRADL's in the home. She would use this in the home for safety, prmarily needs this to  get places outside the home  C.  The mobility deficit cannot be sufficiently and safely resolved by the use of an appropriately fitted cane or walker. She is leaning on a high personal shopping care, as she cannot push a walker or use a cane, this is an unsafe sitution, has fallen outside the home on more than 1 occasion in the past year, broke her femur this past winter  D.  The patient does not have sufficient upper extremity function to self-propel an optimally-configured manual wheelchair in the home to perform MRADL's during a typical day.  E.  The patient has the mental and physical capabilities to safely operate the power wheelchair that is provided    2. Chronic pain syndrome  Using medical marig=shane at this time    3. Encounter for immunization  Getting B12 today    4. Compression fracture of lumbar vertebra, unspecified lumbar vertebral level, sequela  On going back pain, severe kyphosis    5. Primary osteoarthritis of both knees  On lo pain and mobility issues about the house and much worse outside of house    6. Complex regional pain syndrome type 1 of right upper extremity  See above    7. Plantar fasciitis  This cause severe pain in both feet with walking, which is another reason for the scooter             Return in about 6 months (around 10/30/2020) for For chronic pain medication check and refills, weight check.    Brittnee Sharma MD  [facility]

## 2020-05-04 ENCOUNTER — TELEPHONE (OUTPATIENT)
Dept: FAMILY MEDICINE | Facility: CLINIC | Age: 70
End: 2020-05-04

## 2020-05-04 NOTE — TELEPHONE ENCOUNTER
Reason for Call:  Form, our goal is to have forms completed with 72 hours, however, some forms may require a visit or additional information.    Type of letter, form or note:  medical    Who is the form from?: [employer] (if other please explain)    Where did the form come from: form was faxed in    What clinic location was the form placed at?: [facility]     Where the form was placed: Dr Sharma Box/Folder    What number is listed as a contact on the form?: [phone]       Additional comments: please fill out/ sign/date and fax to [phone]    Call taken on 5/4/2020 at 10:28 AM by Livia Sanchez

## 2020-06-01 ENCOUNTER — ALLIED HEALTH/NURSE VISIT (OUTPATIENT)
Dept: FAMILY MEDICINE | Facility: CLINIC | Age: 70
End: 2020-06-01
Payer: MEDICARE

## 2020-06-01 DIAGNOSIS — E53.8 VITAMIN B12 DEFICIENCY WITHOUT ANEMIA: Primary | ICD-10-CM

## 2020-06-01 PROCEDURE — 96372 THER/PROPH/DIAG INJ SC/IM: CPT

## 2020-06-01 PROCEDURE — 99207 ZZC NO CHARGE NURSE ONLY: CPT

## 2020-06-01 RX ADMIN — CYANOCOBALAMIN 1000 MCG: 1000 INJECTION, SOLUTION INTRAMUSCULAR; SUBCUTANEOUS at 11:37

## 2020-06-01 NOTE — PROGRESS NOTES
Clinic Administered Medication Documentation      Injectable Medication Documentation    Patient was given Cyanocobalamin (B-12). Prior to medication administration, verified patients identity using patient s name and date of birth. Please see MAR and medication order for additional information. Patient instructed to remain in clinic for 15 minutes and report any adverse reaction to staff immediately .  Patient tolerated well.    Given IM right deltoid.  Was entire vial of medication used? Yes  Vial/Syringe: Single dose vial  Expiration Date:  Feb.2021  Was this medication supplied by the patient? No   Jalyn Sandoval CMA on 6/1/2020 at 11:45 AM

## 2020-07-01 ENCOUNTER — ALLIED HEALTH/NURSE VISIT (OUTPATIENT)
Dept: FAMILY MEDICINE | Facility: CLINIC | Age: 70
End: 2020-07-01
Payer: MEDICARE

## 2020-07-01 DIAGNOSIS — E53.8 VITAMIN B12 DEFICIENCY WITHOUT ANEMIA: Primary | ICD-10-CM

## 2020-07-01 PROCEDURE — 96372 THER/PROPH/DIAG INJ SC/IM: CPT

## 2020-07-01 PROCEDURE — 99207 ZZC NO CHARGE NURSE ONLY: CPT

## 2020-07-01 RX ADMIN — CYANOCOBALAMIN 1000 MCG: 1000 INJECTION, SOLUTION INTRAMUSCULAR; SUBCUTANEOUS at 13:52

## 2020-07-01 NOTE — PROGRESS NOTES
The following medication was given:     Pt verified using name and .    MEDICATION: Vitamin B12  1000 mcg  ROUTE: SQ  SITE: Arm - Right  DOSE: 1000 mcg/1 mL  LOT #: [id number]  :  [employer]  EXPIRATION DATE:  2021  NDC#: 7024-1316-80    Due to injection administration, patient instructed to remain in clinic for 15 minutes  afterwards, and to report any adverse reaction to me immediately.    Ilan Jimenez CMA ([facility])

## 2020-08-04 ENCOUNTER — ALLIED HEALTH/NURSE VISIT (OUTPATIENT)
Dept: FAMILY MEDICINE | Facility: CLINIC | Age: 70
End: 2020-08-04
Payer: MEDICARE

## 2020-08-04 DIAGNOSIS — E53.8 VITAMIN B12 DEFICIENCY WITHOUT ANEMIA: Primary | ICD-10-CM

## 2020-08-04 PROCEDURE — 96372 THER/PROPH/DIAG INJ SC/IM: CPT

## 2020-08-04 RX ADMIN — CYANOCOBALAMIN 1000 MCG: 1000 INJECTION, SOLUTION INTRAMUSCULAR; SUBCUTANEOUS at 13:58

## 2020-08-04 NOTE — PROGRESS NOTES
Clinic Administered Medication Documentation      Injectable Medication Documentation    Patient was given Cyanocobalamin (B-12). Prior to medication administration, verified patients identity using patient s name and date of birth. Please see MAR and medication order for additional information. Patient instructed to remain in clinic for 15 minutes and report any adverse reaction to staff immediately .      Was entire vial of medication used? Yes  Vial/Syringe: Single dose vial  Was this medication supplied by the patient? No

## 2020-08-11 ENCOUNTER — TELEPHONE (OUTPATIENT)
Dept: FAMILY MEDICINE | Facility: CLINIC | Age: 70
End: 2020-08-11

## 2020-08-11 ENCOUNTER — OFFICE VISIT (OUTPATIENT)
Dept: FAMILY MEDICINE | Facility: CLINIC | Age: 70
End: 2020-08-11
Payer: MEDICARE

## 2020-08-11 VITALS
DIASTOLIC BLOOD PRESSURE: 60 MMHG | TEMPERATURE: 98 F | BODY MASS INDEX: 27.07 KG/M2 | RESPIRATION RATE: 16 BRPM | WEIGHT: 124 LBS | OXYGEN SATURATION: 100 % | HEART RATE: 77 BPM | SYSTOLIC BLOOD PRESSURE: 100 MMHG

## 2020-08-11 DIAGNOSIS — Z00.00 ENCOUNTER FOR MEDICARE ANNUAL WELLNESS EXAM: Primary | ICD-10-CM

## 2020-08-11 DIAGNOSIS — R11.11 INTRACTABLE VOMITING WITHOUT NAUSEA: ICD-10-CM

## 2020-08-11 DIAGNOSIS — M24.411 CHRONIC DISLOCATION OF SHOULDER, RIGHT: ICD-10-CM

## 2020-08-11 DIAGNOSIS — K27.9 PEPTIC ULCER: ICD-10-CM

## 2020-08-11 DIAGNOSIS — I10 ESSENTIAL HYPERTENSION, BENIGN: ICD-10-CM

## 2020-08-11 DIAGNOSIS — H35.30 MACULAR DEGENERATION (SENILE) OF RETINA: ICD-10-CM

## 2020-08-11 PROCEDURE — G0439 PPPS, SUBSEQ VISIT: HCPCS | Performed by: FAMILY MEDICINE

## 2020-08-11 RX ORDER — MULTIPLE VITAMINS W/ MINERALS TAB 9MG-400MCG
TAB ORAL
COMMUNITY
End: 2020-08-11

## 2020-08-11 RX ORDER — LISINOPRIL 10 MG/1
10 TABLET ORAL DAILY
Qty: 90 TABLET | Refills: 1 | Status: SHIPPED | OUTPATIENT
Start: 2020-08-11 | End: 2020-10-14 | Stop reason: DRUGHIGH

## 2020-08-11 RX ORDER — INFLUENZA A VIRUS A/VICTORIA/4897/2022 IVR-238 (H1N1) ANTIGEN (FORMALDEHYDE INACTIVATED), INFLUENZA A VIRUS A/CALIFORNIA/122/2022 SAN-022 (H3N2) ANTIGEN (FORMALDEHYDE INACTIVATED), AND INFLUENZA B VIRUS B/MICHIGAN/01/2021 ANTIGEN (FORMALDEHYDE INACTIVATED) 60; 60; 60 UG/.5ML; UG/.5ML; UG/.5ML
INJECTION, SUSPENSION INTRAMUSCULAR
Refills: 0 | Status: ON HOLD | COMMUNITY
Start: 2019-09-18 | End: 2020-08-12

## 2020-08-11 RX ORDER — OMEPRAZOLE 40 MG/1
40 CAPSULE, DELAYED RELEASE ORAL
Qty: 180 CAPSULE | Refills: 3 | Status: SHIPPED | OUTPATIENT
Start: 2020-08-11 | End: 2021-04-06

## 2020-08-11 NOTE — TELEPHONE ENCOUNTER
Printed order out and faxed to the  [facility] fax# [phone] Attn: [place] at this time.     Sruthi Maddox RN Flex

## 2020-08-11 NOTE — TELEPHONE ENCOUNTER
Summary: Equipment being ordered: arm brace for chronic shoulder dislocation, right arm  Disp-1 Device,R-1, Local Print   Start: 8/11/2020  Ord/Sold: 8/11/2020 (O)  Report  Taking:   Pharmacy: [facility] #[id number] - [facility] [id number] AT [place] [address]  Med Dose History  Change        Patient Sig: Equipment being ordered: arm brace for chronic shoulder dislocation, right arm       Ordered on: 8/11/2020       Authorized by: PRAKASH MCGRAW       Dispense: 1 Device       Admin Instructions: Equipment being ordered: arm brace for chronic shoulder dislocation, right arm       Prior Authorization: Request PA        This was fax to the wrong place.    Please fax this to [facility] fax# [phone] Attn: Libertad Clarke Patient Representative

## 2020-08-11 NOTE — PROGRESS NOTES
"Subjective     Keiko Kimball is a 69 year old female who presents to clinic today for the following health issues:    HPI       Patient here today to have some forms completed and orders for cast to be made for right arm.  Annual Wellness Visit    Are you in the first 12 months of your Medicare Part B coverage?  No    Physical Health:    In general, how would you rate your overall physical health? good    Outside of work, how many days during the week do you exercise?2-3 days/week    Outside of work, approximately how many minutes a day do you exercise?greater than 60 minutes    If you drink alcohol do you typically have >3 drinks per day or >7 drinks per week? Not Applicable    Do you usually eat at least 4 servings of fruit and vegetables a day, include whole grains & fiber and avoid regularly eating high fat or \"junk\" foods? NO    Do you have any problems taking medications regularly? No    Do you have any side effects from medications? none    Needs assistance for the following daily activities: no assistance needed    Which of the following safety concerns are present in your home?  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 no    Mental Health:    In general, how would you rate your overall mental or emotional health? fair  PHQ-2 Score:      Do you feel safe in your environment? Yes    Have you ever done Advance Care Planning? (For example, a Health Directive, POLST, or a discussion with a medical provider or your loved ones about your wishes)? Yes, advance care planning is on file.    Fall risk:  Fallen 2 or more times in the past year?: No  Any fall with injury in the past year?: No    Cognitive Screenin) Repeat 3 items (Leader, Season, Table)    2) Clock draw: NORMAL  3) 3 item recall: Recalls 2 objects   Results: NORMAL clock, 1-2 items recalled: COGNITIVE IMPAIRMENT LESS LIKELY    Mini-CogTM Copyright S " Raven. Licensed by the author for use in [facility]; reprinted with permission (raquel@[facility]). All rights reserved.      Do you have sleep apnea, excessive snoring or daytime drowsiness?: no    Current providers sharing in care for this patient include:   Patient Care Team:  Brittnee Sharma MD as PCP - General ([facility])  Brittnee Sharma MD as Assigned PCP        She had chronic nause and vomiting, taking eye vitamins, high dose to prevent macular degeneration.    Patient presents for evaluation of hypertension.  She indicates that she is feeling well and denies any symptoms referable to her elevated blood pressure. Specifically denies chest pain, palpitations, dyspnea, orthopnea, PND or peripheral edema. Current medication regimen is as listed below. Patient denies any side effects of medication.      joint pain is chronic and no longer needing narcotics, but needs renewal for medical marijuana   Recent Labs   Lab Test 08/13/20  0603 08/12/20  1131 10/09/19  1059 11/09/18  1059  06/30/16  1132  09/12/14  1046 09/06/13  0836   LDL  --   --   --   --   --  99  --  77 85   HDL  --   --   --   --   --  57  --  69 62   TRIG  --   --   --   --   --  116  --  77 74   ALT  --  19 15 20   < > 17   < > 17 29   CR 0.50* 0.63 0.53 0.61   < > 0.58   < > 0.60 0.55   GFRESTIMATED >90 >90 >90 >90   < > >90  Non  GFR Calc     < > >90  Non  GFR Calc   >90   GFRESTBLACK >90 >90 >90 >90   < > >90  African American GFR Calc     < > >90   GFR Calc   >90   POTASSIUM 3.6 3.6 4.4 3.5   < > 3.7   < > 4.0 4.0   TSH  --   --   --  1.63  --   --   --  3.00  --     < > = values in this interval not displayed.      BP Readings from Last 3 Encounters:   08/13/20 132/70   08/11/20 100/60   04/30/20 126/80    Wt Readings from Last 3 Encounters:   08/12/20 56.1 kg (123 lb 9.6 oz)   08/11/20 56.2 kg (124 lb)   04/30/20 57.8 kg (127 lb 6.4 oz)                     Reviewed and updated as needed this visit by Provider         Review of Systems   Constitutional, HEENT, cardiovascular, pulmonary, gi and gu systems are negative, except as otherwise noted.      Objective    /60 (BP Location: Left arm, Patient Position: Sitting, Cuff Size: Adult Regular)   Pulse 77   Temp 98  F (36.7  C) (Oral)   Resp 16   Wt 56.2 kg (124 lb)   SpO2 100%   BMI 27.07 kg/m    Body mass index is 27.07 kg/m .  Physical Exam   GENERAL: healthy, alert and no distress  NECK: no adenopathy, no asymmetry, masses, or scars and thyroid normal to palpation  RESP: lungs clear to auscultation - no rales, rhonchi or wheezes  CV: regular rate and rhythm, normal S1 S2, no S3 or S4, no murmur, click or rub, no peripheral edema and peripheral pulses strong  ABDOMEN: soft, nontender, no hepatosplenomegaly, no masses and bowel sounds normal  MS: no gross musculoskeletal defects noted, no edema    Diagnostic Test Results:  Labs reviewed in Epic        Assessment & Plan     1. Encounter for Medicare annual wellness exam  Doing well    2. Essential hypertension, benign  Well controlled  - lisinopril (ZESTRIL) 10 MG tablet; Take 1 tablet (10 mg) by mouth daily  Dispense: 90 tablet; Refill: 1    3. Peptic ulcer  Cont same, pt has hx of ulcer in the past, no concerns at this time, other then her chronic vomitting, and some chronic weight loss  - omeprazole (PRILOSEC) 40 MG DR capsule; Take 1 capsule (40 mg) by mouth 2 times daily Take 30-60 minutes before a meal.  Dispense: 180 capsule; Refill: 3    4. Chronic dislocation of shoulder, right  Not new, needing to wear brace daily, very limited mobility, this and her back cause her disability, uses cart to walk    5. Intractable vomiting without nausea, vomiting of unspecified type  Some weight loss, GI work up negative, is taking eye vitamins with a lot of zinc    6. Macular degeneration (senile) of retina  The preservision vitamins twice daily, maybe causing  "her stomach issues, but also needed for her eyes. As a trial only, stop for 1 week, if stomach is much improved, ok to add back once daily and see how she does with that. Pt will let me know how this trial goes.       BMI:   Estimated body mass index is 27.07 kg/m  as calculated from the following:    Height as of 4/17/19: 1.441 m (4' 8.75\").    Weight as of this encounter: 56.2 kg (124 lb).   Pt already loosing weight and not trying        See Patient Instructions    Return in about 1 month (around 9/11/2020) for MA Only Visit for Blood Pressure Check.    Brittnee Sharma MD  [facility]    "

## 2020-08-11 NOTE — PATIENT INSTRUCTIONS
Patient Education   Personalized Prevention Plan  You are due for the preventive services outlined below.  Your care team is available to assist you in scheduling these services.  If you have already completed any of these items, please share that information with your care team to update in your medical record.  Health Maintenance Due   Topic Date Due     ANNUAL REVIEW OF HM ORDERS  1950     Annual Wellness Visit  01/09/2016     Mammogram  11/01/2018     URINE DRUG SCREEN  08/07/2019     Diptheria Tetanus Pertussis (DTAP/TDAP/TD) Vaccine (3 - Td) 01/01/2020         Preventive Health Recommendations    See your health care provider every year to    Review health changes.     Discuss preventive care.      Review your medicines if your doctor has prescribed any.    You no longer need a yearly Pap test unless you've had an abnormal Pap test in the past 10 years. If you have vaginal symptoms, such as bleeding or discharge, be sure to talk with your provider about a Pap test.    Every 1 to 2 years, have a mammogram.  If you are over 69, talk with your health care provider about whether or not you want to continue having screening mammograms.    Every 10 years, have a colonoscopy. Or, have a yearly FIT test (stool test). These exams will check for colon cancer.     Have a cholesterol test every 5 years, or more often if your doctor advises it.     Have a diabetes test (fasting glucose) every three years. If you are at risk for diabetes, you should have this test more often.     At age 65, have a bone density scan (DEXA) to check for osteoporosis (brittle bone disease).    Shots:    Get a flu shot each year.    Get a tetanus shot every 10 years.    Talk to your doctor about your pneumonia vaccines. There are now two you should receive - Pneumovax (PPSV 23) and Prevnar (PCV 13).    Talk to your pharmacist about the shingles vaccine.    Talk to your doctor about the hepatitis B vaccine.    Nutrition:     Eat at least  5 servings of fruits and vegetables each day.    Eat whole-grain bread, whole-wheat pasta and brown rice instead of white grains and rice.    Get adequate Calcium and Vitamin D.     Lifestyle    Exercise at least 150 minutes a week (30 minutes a day, 5 days a week). This will help you control your weight and prevent disease.    Limit alcohol to one drink per day.    No smoking.     Wear sunscreen to prevent skin cancer.     See your dentist twice a year for an exam and cleaning.    See your eye doctor every 1 to 2 years to screen for conditions such as glaucoma, macular degeneration and cataracts.    Personalized Prevention Plan  You are due for the preventive services outlined below.  Your care team is available to assist you in scheduling these services.  If you have already completed any of these items, please share that information with your care team to update in your medical record.  Health Maintenance Due   Topic Date Due     ANNUAL REVIEW OF HM ORDERS  1950     Annual Wellness Visit  01/09/2016     Mammogram  11/01/2018     URINE DRUG SCREEN  08/07/2019     Diptheria Tetanus Pertussis (DTAP/TDAP/TD) Vaccine (3 - Td) 01/01/2020     Stop using eye vitamins for 1 week, if doing a lot better, restart 1 tab daily and see how you are doing  Call and let me know

## 2020-08-12 ENCOUNTER — TELEPHONE (OUTPATIENT)
Dept: FAMILY MEDICINE | Facility: CLINIC | Age: 70
End: 2020-08-12

## 2020-08-12 ENCOUNTER — HOSPITAL ENCOUNTER (OUTPATIENT)
Facility: CLINIC | Age: 70
Setting detail: OBSERVATION
Discharge: HOME | End: 2020-08-13
Attending: EMERGENCY MEDICINE | Admitting: HOSPITALIST
Payer: MEDICARE

## 2020-08-12 ENCOUNTER — APPOINTMENT (OUTPATIENT)
Dept: CT IMAGING | Facility: CLINIC | Age: 70
End: 2020-08-12
Attending: EMERGENCY MEDICINE
Payer: MEDICARE

## 2020-08-12 DIAGNOSIS — K52.9 COLITIS: ICD-10-CM

## 2020-08-12 DIAGNOSIS — N39.0 URINARY TRACT INFECTION WITH HEMATURIA, SITE UNSPECIFIED: ICD-10-CM

## 2020-08-12 DIAGNOSIS — R31.9 URINARY TRACT INFECTION WITH HEMATURIA, SITE UNSPECIFIED: ICD-10-CM

## 2020-08-12 LAB
ALBUMIN SERPL-MCNC: 3.8 G/DL (ref 3.4–5)
ALBUMIN UR-MCNC: 300 MG/DL
ALP SERPL-CCNC: 114 U/L (ref 40–150)
ALT SERPL W P-5'-P-CCNC: 19 U/L (ref 0–50)
ANION GAP SERPL CALCULATED.3IONS-SCNC: 6 MMOL/L (ref 3–14)
APPEARANCE UR: ABNORMAL
AST SERPL W P-5'-P-CCNC: 25 U/L (ref 0–45)
BACTERIA #/AREA URNS HPF: ABNORMAL /HPF
BASOPHILS # BLD AUTO: 0 10E9/L (ref 0–0.2)
BASOPHILS NFR BLD AUTO: 0.1 %
BILIRUB SERPL-MCNC: 1.7 MG/DL (ref 0.2–1.3)
BILIRUB UR QL STRIP: NEGATIVE
BUN SERPL-MCNC: 16 MG/DL (ref 7–30)
CALCIUM SERPL-MCNC: 9.1 MG/DL (ref 8.5–10.1)
CHLORIDE SERPL-SCNC: 106 MMOL/L (ref 94–109)
CO2 SERPL-SCNC: 26 MMOL/L (ref 20–32)
COLOR UR AUTO: ABNORMAL
CREAT SERPL-MCNC: 0.63 MG/DL (ref 0.52–1.04)
DIFFERENTIAL METHOD BLD: ABNORMAL
EOSINOPHIL # BLD AUTO: 0 10E9/L (ref 0–0.7)
EOSINOPHIL NFR BLD AUTO: 0 %
ERYTHROCYTE [DISTWIDTH] IN BLOOD BY AUTOMATED COUNT: 13.8 % (ref 10–15)
GFR SERPL CREATININE-BSD FRML MDRD: >90 ML/MIN/{1.73_M2}
GLUCOSE SERPL-MCNC: 127 MG/DL (ref 70–99)
GLUCOSE UR STRIP-MCNC: NEGATIVE MG/DL
HCT VFR BLD AUTO: 42.4 % (ref 35–47)
HGB BLD-MCNC: 13.8 G/DL (ref 11.7–15.7)
HGB UR QL STRIP: ABNORMAL
IMM GRANULOCYTES # BLD: 0.1 10E9/L (ref 0–0.4)
IMM GRANULOCYTES NFR BLD: 0.6 %
KETONES UR STRIP-MCNC: NEGATIVE MG/DL
LEUKOCYTE ESTERASE UR QL STRIP: ABNORMAL
LYMPHOCYTES # BLD AUTO: 0.7 10E9/L (ref 0.8–5.3)
LYMPHOCYTES NFR BLD AUTO: 3.2 %
MCH RBC QN AUTO: 30.5 PG (ref 26.5–33)
MCHC RBC AUTO-ENTMCNC: 32.5 G/DL (ref 31.5–36.5)
MCV RBC AUTO: 94 FL (ref 78–100)
MONOCYTES # BLD AUTO: 0.8 10E9/L (ref 0–1.3)
MONOCYTES NFR BLD AUTO: 3.8 %
MUCOUS THREADS #/AREA URNS LPF: PRESENT /LPF
NEUTROPHILS # BLD AUTO: 19.3 10E9/L (ref 1.6–8.3)
NEUTROPHILS NFR BLD AUTO: 92.3 %
NITRATE UR QL: NEGATIVE
NRBC # BLD AUTO: 0 10*3/UL
NRBC BLD AUTO-RTO: 0 /100
PH UR STRIP: 6.5 PH (ref 5–7)
PLATELET # BLD AUTO: 322 10E9/L (ref 150–450)
POTASSIUM SERPL-SCNC: 3.6 MMOL/L (ref 3.4–5.3)
PROT SERPL-MCNC: 7.1 G/DL (ref 6.8–8.8)
RBC # BLD AUTO: 4.53 10E12/L (ref 3.8–5.2)
RBC #/AREA URNS AUTO: >182 /HPF (ref 0–2)
SODIUM SERPL-SCNC: 138 MMOL/L (ref 133–144)
SOURCE: ABNORMAL
SP GR UR STRIP: 1.03 (ref 1–1.03)
SQUAMOUS #/AREA URNS AUTO: 4 /HPF (ref 0–1)
UROBILINOGEN UR STRIP-MCNC: 2 MG/DL (ref 0–2)
WBC # BLD AUTO: 20.9 10E9/L (ref 4–11)
WBC #/AREA URNS AUTO: >182 /HPF (ref 0–5)

## 2020-08-12 PROCEDURE — 25000128 H RX IP 250 OP 636: Performed by: EMERGENCY MEDICINE

## 2020-08-12 PROCEDURE — G0378 HOSPITAL OBSERVATION PER HR: HCPCS

## 2020-08-12 PROCEDURE — 96361 HYDRATE IV INFUSION ADD-ON: CPT

## 2020-08-12 PROCEDURE — 25000125 ZZHC RX 250: Performed by: EMERGENCY MEDICINE

## 2020-08-12 PROCEDURE — 99285 EMERGENCY DEPT VISIT HI MDM: CPT | Mod: 25

## 2020-08-12 PROCEDURE — 96365 THER/PROPH/DIAG IV INF INIT: CPT | Mod: 59

## 2020-08-12 PROCEDURE — U0003 INFECTIOUS AGENT DETECTION BY NUCLEIC ACID (DNA OR RNA); SEVERE ACUTE RESPIRATORY SYNDROME CORONAVIRUS 2 (SARS-COV-2) (CORONAVIRUS DISEASE [COVID-19]), AMPLIFIED PROBE TECHNIQUE, MAKING USE OF HIGH THROUGHPUT TECHNOLOGIES AS DESCRIBED BY CMS-2020-01-R: HCPCS | Performed by: EMERGENCY MEDICINE

## 2020-08-12 PROCEDURE — 85025 COMPLETE CBC W/AUTO DIFF WBC: CPT | Performed by: EMERGENCY MEDICINE

## 2020-08-12 PROCEDURE — 80053 COMPREHEN METABOLIC PANEL: CPT | Performed by: EMERGENCY MEDICINE

## 2020-08-12 PROCEDURE — 87086 URINE CULTURE/COLONY COUNT: CPT | Performed by: PHYSICIAN ASSISTANT

## 2020-08-12 PROCEDURE — 96366 THER/PROPH/DIAG IV INF ADDON: CPT

## 2020-08-12 PROCEDURE — 25800030 ZZH RX IP 258 OP 636: Performed by: EMERGENCY MEDICINE

## 2020-08-12 PROCEDURE — 25800030 ZZH RX IP 258 OP 636: Performed by: PHYSICIAN ASSISTANT

## 2020-08-12 PROCEDURE — 74177 CT ABD & PELVIS W/CONTRAST: CPT

## 2020-08-12 PROCEDURE — 81001 URINALYSIS AUTO W/SCOPE: CPT | Performed by: EMERGENCY MEDICINE

## 2020-08-12 PROCEDURE — C9803 HOPD COVID-19 SPEC COLLECT: HCPCS

## 2020-08-12 PROCEDURE — 99220 ZZC INITIAL OBSERVATION CARE,LEVL III: CPT | Performed by: PHYSICIAN ASSISTANT

## 2020-08-12 PROCEDURE — 25000132 ZZH RX MED GY IP 250 OP 250 PS 637: Mod: GY | Performed by: PHYSICIAN ASSISTANT

## 2020-08-12 RX ORDER — SODIUM CHLORIDE 9 MG/ML
INJECTION, SOLUTION INTRAVENOUS CONTINUOUS
Status: ACTIVE | OUTPATIENT
Start: 2020-08-12 | End: 2020-08-13

## 2020-08-12 RX ORDER — LISINOPRIL 10 MG/1
10 TABLET ORAL DAILY
Status: DISCONTINUED | OUTPATIENT
Start: 2020-08-12 | End: 2020-08-13 | Stop reason: HOSPADM

## 2020-08-12 RX ORDER — IOPAMIDOL 755 MG/ML
500 INJECTION, SOLUTION INTRAVASCULAR ONCE
Status: COMPLETED | OUTPATIENT
Start: 2020-08-12 | End: 2020-08-12

## 2020-08-12 RX ORDER — ACETAMINOPHEN 325 MG/1
650 TABLET ORAL EVERY 4 HOURS PRN
Status: DISCONTINUED | OUTPATIENT
Start: 2020-08-12 | End: 2020-08-13 | Stop reason: HOSPADM

## 2020-08-12 RX ORDER — PROCHLORPERAZINE MALEATE 5 MG
5 TABLET ORAL EVERY 6 HOURS PRN
Status: DISCONTINUED | OUTPATIENT
Start: 2020-08-12 | End: 2020-08-13 | Stop reason: HOSPADM

## 2020-08-12 RX ORDER — CEFTRIAXONE 1 G/1
1 INJECTION, POWDER, FOR SOLUTION INTRAMUSCULAR; INTRAVENOUS ONCE
Status: COMPLETED | OUTPATIENT
Start: 2020-08-12 | End: 2020-08-12

## 2020-08-12 RX ORDER — AMOXICILLIN 250 MG
2 CAPSULE ORAL 2 TIMES DAILY
Status: DISCONTINUED | OUTPATIENT
Start: 2020-08-12 | End: 2020-08-13 | Stop reason: HOSPADM

## 2020-08-12 RX ORDER — CEFTRIAXONE 1 G/1
1 INJECTION, POWDER, FOR SOLUTION INTRAMUSCULAR; INTRAVENOUS EVERY 24 HOURS
Status: DISCONTINUED | OUTPATIENT
Start: 2020-08-13 | End: 2020-08-13 | Stop reason: HOSPADM

## 2020-08-12 RX ORDER — SENNOSIDES 8.6 MG
650 CAPSULE ORAL EVERY 8 HOURS PRN
Status: DISCONTINUED | OUTPATIENT
Start: 2020-08-12 | End: 2020-08-12

## 2020-08-12 RX ORDER — NALOXONE HYDROCHLORIDE 0.4 MG/ML
.1-.4 INJECTION, SOLUTION INTRAMUSCULAR; INTRAVENOUS; SUBCUTANEOUS
Status: DISCONTINUED | OUTPATIENT
Start: 2020-08-12 | End: 2020-08-13 | Stop reason: HOSPADM

## 2020-08-12 RX ORDER — ONDANSETRON 2 MG/ML
4 INJECTION INTRAMUSCULAR; INTRAVENOUS EVERY 6 HOURS PRN
Status: DISCONTINUED | OUTPATIENT
Start: 2020-08-12 | End: 2020-08-13 | Stop reason: HOSPADM

## 2020-08-12 RX ORDER — PROCHLORPERAZINE 25 MG
12.5 SUPPOSITORY, RECTAL RECTAL EVERY 12 HOURS PRN
Status: DISCONTINUED | OUTPATIENT
Start: 2020-08-12 | End: 2020-08-13 | Stop reason: HOSPADM

## 2020-08-12 RX ORDER — AMOXICILLIN 250 MG
1 CAPSULE ORAL 2 TIMES DAILY
Status: DISCONTINUED | OUTPATIENT
Start: 2020-08-12 | End: 2020-08-13 | Stop reason: HOSPADM

## 2020-08-12 RX ORDER — ONDANSETRON 4 MG/1
4 TABLET, ORALLY DISINTEGRATING ORAL EVERY 6 HOURS PRN
Status: DISCONTINUED | OUTPATIENT
Start: 2020-08-12 | End: 2020-08-13 | Stop reason: HOSPADM

## 2020-08-12 RX ADMIN — SODIUM CHLORIDE 55 ML: 9 INJECTION, SOLUTION INTRAVENOUS at 12:45

## 2020-08-12 RX ADMIN — SODIUM CHLORIDE 500 ML: 9 INJECTION, SOLUTION INTRAVENOUS at 11:32

## 2020-08-12 RX ADMIN — SODIUM CHLORIDE: 9 INJECTION, SOLUTION INTRAVENOUS at 17:34

## 2020-08-12 RX ADMIN — CEFTRIAXONE 1 G: 1 INJECTION, POWDER, FOR SOLUTION INTRAMUSCULAR; INTRAVENOUS at 13:50

## 2020-08-12 RX ADMIN — IOPAMIDOL 60 ML: 755 INJECTION, SOLUTION INTRAVENOUS at 12:45

## 2020-08-12 RX ADMIN — LISINOPRIL 10 MG: 10 TABLET ORAL at 17:33

## 2020-08-12 ASSESSMENT — ENCOUNTER SYMPTOMS
ABDOMINAL PAIN: 1
ANAL BLEEDING: 0
FEVER: 0
DIARRHEA: 0
SHORTNESS OF BREATH: 0
HEMATURIA: 1
COUGH: 0
NAUSEA: 1
VOMITING: 1

## 2020-08-12 ASSESSMENT — MIFFLIN-ST. JEOR: SCORE: 950.27

## 2020-08-12 NOTE — ED PROVIDER NOTES
"  History     Chief Complaint:  Nausea, Vomiting, & Hematuria      HPI   Keiko Kimball is a 69 year old female who presents for evaluation of nausea, vomiting, and hematuria. Patient states she had a cortisone shot in her left shoulder yesterday. She states yesterday evening having a few episodes of nonbilious nonbloody emesis as well as hematuria.  She reports noticing a \"few clots\" yesterday as well. her She also reports mild suprapubic discomfort as well as loose nonbloody stools yesterday though none currently.  She feels the shot may have caused these symptoms. She denies any fever, cough, back pain, dysuria or other symptoms.     Allergies:  Actonel  Fentanyl  Azithromycin  Celebrex  Aspirin  Boniva  Codeine  Nsaids  Dilaudid  Tramadol  Vistaril  Vicodin  Hydrocodone  Sulfa Drugs    Medications:    Lisinopril  Omeprazole     Past Medical History:    Bell's palsy  Bisphosphonate-related jaw necrosis  CTS  Fibromyalgia  GERD  Osteoarthrosis  Macular degeneration  PUD  Pseudogout  Reflex sympathetic dystrophy  Sciatica  Trigger finger  Hearing loss  Vitamin D deficiency  Chronic pain syndrome  HTN  Vitamin B12 deficiency    Past Surgical History:    Right total elbow arthroplasty complex  Appendectomy  Excise left knee cartilage  Left foot tendon repair  Ligate ethmoid artery  Ligate intern maxill artery  Right should replacement  Carpal tunnel release  Interposition tendon hand  Release trigger finger  Rotator cuff repair, right  Tonsillectomy    Family History:    Lymphoma - mother  Esophageal cancer - brother    Social History:  Smoking status: never smoker  Alcohol use: yes  Drug use: no  The patient presents to the emergency department by herself.  PCP: Brittnee Sharma  Marital Status:       Review of Systems   Constitutional: Negative for fever.   Respiratory: Negative for cough and shortness of breath.    Gastrointestinal: Positive for abdominal pain (resolved), nausea and vomiting " (resolved). Negative for anal bleeding and diarrhea.   Genitourinary: Positive for hematuria. Negative for vaginal bleeding.   All other systems reviewed and are negative.    Physical Exam     Patient Vitals for the past 24 hrs:   BP Temp Temp src Heart Rate Resp SpO2 Weight   08/12/20 1008 -- -- -- -- -- -- 54 kg (119 lb 0.8 oz)   08/12/20 1007 110/74 97.9  F (36.6  C) Oral 94 20 99 % 50 kg (110 lb 3.7 oz)     Physical Exam  Nursing note and vitals reviewed.  Constitutional: Well nourished. Significant cervical kyphosis  Eyes: Conjunctiva normal.  Pupils are equal, round, and reactive to light.   ENT: Nose normal. Mucous membranes pink and moist.    Neck: Normal range of motion.  CVS: Normal rate, regular rhythm.  Normal heart sounds.   Pulmonary: Lungs clear to auscultation bilaterally. No wheezes/rales/rhonchi.  GI: Abdomen soft. Mild suprapubic tenderness. No rigidity or guarding.  No CVA tenderness  Pelvic: No obvious blood in vaginal vault  MSK: No calf tenderness or swelling.  R. Shoulder with chronic appearing deformity, baseline per patient  Neuro: Alert. Follows simple commands.  Skin: Skin is warm and dry. No rash noted.   Psychiatric: Normal affect.       Emergency Department Course   Imaging:  Radiographic findings were communicated with the patient who voiced understanding of the findings.  CT Abdomen pelvis with contrast trauma/AAA only:   IMPRESSION:   1.  Prominent inflammatory wall thickening at the colon from the mid   transverse colon, and also involving the splenic flexure and   descending colon. This may relate to an infectious or inflammatory   colitis. Ischemic colitis remains in the differential as well.   2.  No additional acute abnormality.   3.  Left ovarian/adnexal cystic lesion identified. as per radiology.      Laboratory:  CBC: WBC: 20.9 (H), HGB: 13.8, PLT: 322  CMP: Glucose 127 (H), Bilirubin 1.7 (H), o/w WNL (Creatinine: 0.63)  UA: Blood large, Protein albumin 300, Leukocyte  esterase moderate, WBC >182 (H), RBC >182 (H), Bacteria few, Squamous epithelial 4 (H), Mucous present, o/w Negative  ABO/rh type and screen: Pending  Symptomatic COVID-19 PCR: Pending    Interventions:  1132  mL IV  1350 Rocephin 1 g IV    Emergency Department Course:  Nursing notes and vitals reviewed. (1019) I performed an exam of the patient as documented above.     IV inserted. Medicine administered as documented above. Blood drawn. Urine sample obtained. COVID swab obtained. This was sent to the lab for further testing, results above.     The patient was sent for a CT while in the emergency department, findings above.     1355 I rechecked the patient and discussed the results of her workup thus far.     1404  I consulted with Dr. Chavez of the hospitalist services. She is in agreement to accept the patient for admission.    Findings and plan explained to the Patient who consents to admission. Discussed the patient with Dr. Chavez, who will admit the patient to a inpatient med bed for further monitoring, evaluation, and treatment.      Impression & Plan    Medical Decision Making:  Patient is a 69-year-old female presenting with reported nausea, vomiting and hematuria. Triage note reports vaginal bleeding though patient clarifies more hematuria. She is nontoxic on arrival.  UA does suggest concerns for infection today.  She was given a dose of IV antibiotics, formal urine culture sent.  She is not septic appearing though given significantly elevated white count decision was made to pursue formal CT.  This suggests concerns for colitis as well.  Patient denies any vomiting/diarrhea today.  She was unable to provide a stool sample.  She has no significant abdominal pain on exam.  Patient to benefit from IV hydration and IV antibiotics for management of UTI.  She was accepted by hospitalist for admission.     Covid-19  Keiko Kimball was evaluated during a global COVID-19 pandemic, which necessitated  consideration that the patient might be at risk for infection with the SARS-CoV-2 virus that causes COVID-19.   Applicable protocols for evaluation were followed during the patient's care.   COVID-19 was considered as part of the patient's evaluation. The plan for testing is:  a test was obtained during this visit.    Diagnosis:    ICD-10-CM    1. Urinary tract infection with hematuria, site unspecified  N39.0     R31.9    2. Colitis  K52.9        Disposition:  Admitted to the hospital  Mohinder Aiken  8/12/2020   [facility] EMERGENCY DEPARTMENT  Scribe Disclosure:  IMohinder, am serving as a scribe at 10:19 AM on 8/12/2020 to document services personally performed by Estee Matias DO based on my observations and the provider's statements to me.        Estee Matias DO  08/12/20 [id number]

## 2020-08-12 NOTE — TELEPHONE ENCOUNTER
Patient calls,    Received cortisone injection and experienced symptoms over night including fever, sweating, diarrhea, and others asked for clarification on symptoms and patient hung up, RN familiar with patient called back    Jason Medellin RN

## 2020-08-12 NOTE — ED NOTES
Pt's iv site found to be infiltrated. IV discontinued, intact, dressing applied. Provider aware. Warm pack to site.

## 2020-08-12 NOTE — H&P
H and P short note.  Full Note to follow    68 yo fairly healthy female with a hx of postprandial vomiting, GERD, who presents to the emergency room with complaints of diarrhea and bleeding.  She believes that she had clots coming from her rectum and also from her urinary tract.    Work-up in the emergency room reveals leukocytosis of 20,000 along with CT scan consistent with colitis.  Clinically she is afebrile has no abdominal pain and feels hungry.    --Suspect leukocytosis is due to cortisone injection she just received in her left shoulder yesterday  --UA looks infected, will continue Rocephin, and await cx  --Check stool studies as she's still c/o frequent stooling  --Abd exams though she is non tender on exam

## 2020-08-12 NOTE — PLAN OF CARE
ROOM # 225    Living Situation (if not independent, order SW consult): Ind senior living   Facility name: [facility]  : Mona (sister in law)    Activity level at baseline: Ind (uses cart when leacing the house)  Activity level on admit: SBA      Patient registered to observation; given Patient Bill of Rights; given the opportunity to ask questions about observation status and their plan of care.  Patient has been oriented to the observation room, bathroom and call light is in place.    Discussed discharge goals and expectations with patient/family.

## 2020-08-12 NOTE — TELEPHONE ENCOUNTER
Called patient back and spoke with patient.  She was very short with me.  She states that she went to [facility] yesterday around noon and received a cortisone injection in her upper left shoulder for her rotator cuff from [facility].  She states that she has never had any problems before with cortisone injections.  She had one in her knee about 2 years ago with no issues, but she feels like this injection has caused her to become very ill.  Her symptoms include:    She states that she started bleeding from her vagina last night-passing clots, vomiting, sweating, liquid diarrhea and nausea.  She is very irritable on the phone and states over and over that she does not have time for this and is just going to go to the hospital.  I asked her how she is going to get there and she said she would figure it out.  I asked her if I could help her or if I needed to call 911 for transport and she refused and said she would contact a neighbor or a friend or if she had to she would take a cab.    I told her I would call her back in a 1/2 hour to follow up with her and she stated ok and hung up.    Shruthi Russo RN

## 2020-08-12 NOTE — PHARMACY-ADMISSION MEDICATION HISTORY
Admission medication history interview status for this patient is complete. See [facility] admission navigator for allergy information, prior to admission medications and immunization status.     Medication history interview done via telephone during Covid-19 pandemic, indicate source(s): Patient  Medication history resources (including written lists, pill bottles, clinic record): SureScripts and Care Everywhere    Changes made to PTA medication list:  Added: none  Deleted: diclofenac gel  Changed: none    Actions taken by pharmacist (provider contacted, etc): Called pt to verify home med list     Additional medication history information: Pt has home CBD topical cream and oral liquid for inpatient use.     Medication reconciliation/reorder completed by provider prior to medication history?  N   (Y/N)     For patients on insulin therapy: N  (Y/N)      Prior to Admission medications    Medication Sig Last Dose Taking? Auth Provider   acetaminophen (TYLENOL) 650 MG CR tablet Take 650 mg by mouth every 8 hours as needed  Past Week at Unknown time Yes Reported, Patient   cyanocobalamin (VITAMIN B12) 1000 MCG/ML injection Inject 1 mL (1,000 mcg) into the muscle every 30 days 8/4/2020 Yes Brittnee Sharma MD   lisinopril (ZESTRIL) 10 MG tablet Take 1 tablet (10 mg) by mouth daily 8/11/2020 at am Yes Brittnee Sharma MD   medical cannabis (Patient's own supply) See Admin Instructions (The purpose of this order is to document that the patient reports taking medical cannabis.  This is not a prescription, and is not used to certify that the patient has a qualifying medical condition.)   Uses topical cream on knees and oral liquid. 8/11/2020 at Unknown time Yes Reported, Patient   Multiple Vitamins-Minerals (PRESERVISION AREDS 2) CAPS Take 240 mg by mouth 2 times daily (with meals) 8/11/2020 at pm Yes Reported, Patient   omeprazole (PRILOSEC) 40 MG DR capsule Take 1 capsule (40 mg) by mouth 2 times daily Take 30-60  minutes before a meal. 8/11/2020 at pm Yes Brittnee Sharma MD

## 2020-08-12 NOTE — ED NOTES
[facility]  ED Nurse Handoff Report    Keiko Kimball is a 69 year old female   ED Chief complaint: Nausea, Vomiting, & Diarrhea  . ED Diagnosis:   Final diagnoses:   Urinary tract infection with hematuria, site unspecified   Colitis     Allergies:   Allergies   Allergen Reactions     Actonel [Risedronate Sodium] Anaphylaxis     Fentanyl Hives     Azithromycin Palpitations     Celebrex [Celecoxib] Rash     Asa [Aspirin]      Pt cannot use ASA, or salicylates because she has clippingsof arteries in her nose sec to bleeding     Boniva [Ibandronate Sodium]      Jaw pain, nausea , vomiting , teeth pain . Pt has rt hip fracture      Codeine Nausea and Vomiting     Dilaudid Cough [Dilaudid Cough]      headaches     Nsaids      Pt had bypass, duodenal ulcer, esophageal ulcer, cannot take NSAIDS     Tramadol      Nausea , vomiting      Vicodin [Hydrocodone-Acetaminophen]      headaches     Vistaril Other (See Comments)     Headaches and dry heaves       Codeine Nausea and Vomiting and Rash     Hydrocodone Nausea and Vomiting and Rash     Sulfa Drugs Hives and Rash       Code Status: Full Code  Activity level - Baseline/Home:  Independent. Activity Level - Current:   Independent. Lift room needed: No. Bariatric: No   Needed: No   Isolation: No. Infection: Not Applicable  covid pending.     Vital Signs:   Vitals:    08/12/20 1306 08/12/20 1307 08/12/20 1400 08/12/20 1405   BP:  114/76     Pulse:  80     Resp:       Temp:    98.3  F (36.8  C)   TempSrc:    Oral   SpO2: 95% 98% 98%    Weight:           Cardiac Rhythm:  ,      Pain level:    Patient confused: No. Patient Falls Risk: No.   Elimination Status: Has voided   Patient Report - Initial Complaint: vaginal bleeding. Focused Assessment: vag bleedng,    Tests Performed:   Labs Ordered and Resulted from Time of ED Arrival Up to the Time of Departure from the ED   CBC WITH PLATELETS DIFFERENTIAL - Abnormal; Notable for the following components:        Result Value    WBC 20.9 (*)     Absolute Neutrophil 19.3 (*)     Absolute Lymphocytes 0.7 (*)     All other components within normal limits   COMPREHENSIVE METABOLIC PANEL - Abnormal; Notable for the following components:    Glucose 127 (*)     Bilirubin Total 1.7 (*)     All other components within normal limits   ROUTINE UA WITH MICROSCOPIC - Abnormal; Notable for the following components:    Blood Urine Large (*)     Protein Albumin Urine 300 (*)     Leukocyte Esterase Urine Moderate (*)     WBC Urine >182 (*)     RBC Urine >182 (*)     Bacteria Urine Few (*)     Squamous Epithelial /HPF Urine 4 (*)     Mucous Urine Present (*)     All other components within normal limits   COVID-19 VIRUS (CORONAVIRUS) BY PCR   ABO/RH TYPE AND SCREEN   . Abnormal Results: as noted.   Treatments provided:   Abd/pelvis CT,  IV  contrast only TRAUMA / AAA   Final Result   IMPRESSION:    1.  Prominent inflammatory wall thickening at the colon from the mid   transverse colon, and also involving the splenic flexure and   descending colon. This may relate to an infectious or inflammatory   colitis. Ischemic colitis remains in the differential as well.   2.  No additional acute abnormality.   3.  Left ovarian/adnexal cystic lesion identified.      FER ZAMBRANO MD        Family Comments: daughter flex Mccrary has her phone number  OBS brochure/video discussed/provided to patient:  Yes  ED Medications:   Medications   0.9% sodium chloride BOLUS (0 mLs Intravenous Stopped 8/12/20 1300)   iopamidol (ISOVUE-370) solution 500 mL (60 mLs Intravenous Given 8/12/20 1245)   sodium chloride 0.9 % bag 500mL for CT scan flush use (55 mLs Intravenous Given 8/12/20 1245)   cefTRIAXone (ROCEPHIN) 1 g vial to attach to  mL bag for ADULTS or NS 50 mL bag for PEDS (0 g Intravenous Stopped 8/12/20 1357)     Drips infusing:  Yes  For the majority of the shift, the patient's behavior Green. Interventions performed were none.    Sepsis treatment  initiated: No       ED Nurse Name/Phone Number: Julissa Gomez RN,   2:13 PM    RECEIVING UNIT ED HANDOFF REVIEW    Above ED Nurse Handoff Report was reviewed: Yes  Reviewed by: Janey Banerjee RN on August 12, 2020 at 3:47 PM

## 2020-08-12 NOTE — ED TRIAGE NOTES
Pt had cortisone shot in shoulder yesterday.  She then developed vaginal bleeding along with nausea, vomiting and diarrhea.  She then developed blood in the stool and urine.

## 2020-08-13 ENCOUNTER — APPOINTMENT (OUTPATIENT)
Dept: ULTRASOUND IMAGING | Facility: CLINIC | Age: 70
End: 2020-08-13
Attending: PHYSICIAN ASSISTANT
Payer: MEDICARE

## 2020-08-13 VITALS
SYSTOLIC BLOOD PRESSURE: 132 MMHG | WEIGHT: 123.6 LBS | OXYGEN SATURATION: 97 % | RESPIRATION RATE: 16 BRPM | BODY MASS INDEX: 26.66 KG/M2 | DIASTOLIC BLOOD PRESSURE: 70 MMHG | HEART RATE: 80 BPM | HEIGHT: 57 IN | TEMPERATURE: 97 F

## 2020-08-13 LAB
ANION GAP SERPL CALCULATED.3IONS-SCNC: 4 MMOL/L (ref 3–14)
BACTERIA SPEC CULT: NORMAL
BUN SERPL-MCNC: 14 MG/DL (ref 7–30)
CALCIUM SERPL-MCNC: 8 MG/DL (ref 8.5–10.1)
CHLORIDE SERPL-SCNC: 108 MMOL/L (ref 94–109)
CO2 SERPL-SCNC: 26 MMOL/L (ref 20–32)
CREAT SERPL-MCNC: 0.5 MG/DL (ref 0.52–1.04)
ERYTHROCYTE [DISTWIDTH] IN BLOOD BY AUTOMATED COUNT: 14.3 % (ref 10–15)
GFR SERPL CREATININE-BSD FRML MDRD: >90 ML/MIN/{1.73_M2}
GLUCOSE SERPL-MCNC: 98 MG/DL (ref 70–99)
HCT VFR BLD AUTO: 35 % (ref 35–47)
HGB BLD-MCNC: 11.1 G/DL (ref 11.7–15.7)
Lab: NORMAL
MCH RBC QN AUTO: 30.7 PG (ref 26.5–33)
MCHC RBC AUTO-ENTMCNC: 31.7 G/DL (ref 31.5–36.5)
MCV RBC AUTO: 97 FL (ref 78–100)
PLATELET # BLD AUTO: 258 10E9/L (ref 150–450)
POTASSIUM SERPL-SCNC: 3.6 MMOL/L (ref 3.4–5.3)
RBC # BLD AUTO: 3.61 10E12/L (ref 3.8–5.2)
SODIUM SERPL-SCNC: 138 MMOL/L (ref 133–144)
SPECIMEN SOURCE: NORMAL
WBC # BLD AUTO: 16.5 10E9/L (ref 4–11)

## 2020-08-13 PROCEDURE — 96376 TX/PRO/DX INJ SAME DRUG ADON: CPT

## 2020-08-13 PROCEDURE — 36415 COLL VENOUS BLD VENIPUNCTURE: CPT | Performed by: PHYSICIAN ASSISTANT

## 2020-08-13 PROCEDURE — 80048 BASIC METABOLIC PNL TOTAL CA: CPT | Performed by: PHYSICIAN ASSISTANT

## 2020-08-13 PROCEDURE — 25000128 H RX IP 250 OP 636: Performed by: PHYSICIAN ASSISTANT

## 2020-08-13 PROCEDURE — G0378 HOSPITAL OBSERVATION PER HR: HCPCS

## 2020-08-13 PROCEDURE — 25000132 ZZH RX MED GY IP 250 OP 250 PS 637: Mod: GY | Performed by: PHYSICIAN ASSISTANT

## 2020-08-13 PROCEDURE — 99217 ZZC OBSERVATION CARE DISCHARGE: CPT | Performed by: PHYSICIAN ASSISTANT

## 2020-08-13 PROCEDURE — 96361 HYDRATE IV INFUSION ADD-ON: CPT

## 2020-08-13 PROCEDURE — 85027 COMPLETE CBC AUTOMATED: CPT | Performed by: PHYSICIAN ASSISTANT

## 2020-08-13 PROCEDURE — 76770 US EXAM ABDO BACK WALL COMP: CPT

## 2020-08-13 RX ORDER — CEFDINIR 300 MG/1
300 CAPSULE ORAL 2 TIMES DAILY
Qty: 10 CAPSULE | Refills: 0 | Status: SHIPPED | OUTPATIENT
Start: 2020-08-13 | End: 2020-08-18

## 2020-08-13 RX ADMIN — CEFTRIAXONE SODIUM 1 G: 1 INJECTION, POWDER, FOR SOLUTION INTRAMUSCULAR; INTRAVENOUS at 12:45

## 2020-08-13 RX ADMIN — LISINOPRIL 10 MG: 10 TABLET ORAL at 08:01

## 2020-08-13 NOTE — PLAN OF CARE
GASTROENTERITIS- UTI with hematuria     OUTPATIENT/OBSERVATION GOALS TO BE MET BEFORE DISCHARGE  1. Orthostatic performed: No     2. Tolerating PO fluid and/or antibiotics (if applicable): Yes     3. Nausea/Vomiting/Diarrhea symptoms improved: Yes, denies N/V/D     4. Pain status: Denies pain     5. Return to near baseline physical activity: Yes-SBA     Discharge Planner Nurse   Safe discharge environment identified: Yes  Barriers to discharge: Yes, until medically cleared         Entered by: Ton Cavazos RN      Please review provider order for any additional goals.   Nurse to notify provider when observation goals have been met and patient is ready for discharge.       Vitals are Temp: 97.3  F (36.3  C) Temp src: Oral BP: 126/64 Pulse: 80 Heart Rate: 79 Resp: 16 SpO2: 96 %.  Patient is Alert and Oriented x4. SBA with Gait Belt and Walker.  Patient is on Enteric precuations for diarrhea and still needing stool sample.  No stool since on the floor. Denies pain. Regular diet.   Patient is Saline locked. Rocephin Q24 for UTI. Urine this morning pink in color and one small clot. Will continue to monitor and provide cares

## 2020-08-13 NOTE — PLAN OF CARE
GASTROENTERITIS- UTI with hematuria     OUTPATIENT/OBSERVATION GOALS TO BE MET BEFORE DISCHARGE  1. Orthostatic performed: No     2. Tolerating PO fluid and/or antibiotics (if applicable): Yes     3. Nausea/Vomiting/Diarrhea symptoms improved: Yes, denies N/V/D     4. Pain status: Pain free.- denies pain other than some light cramping in abd     5. Return to near baseline physical activity: Yes-SBA     Discharge Planner Nurse   Safe discharge environment identified: Yes  Barriers to discharge: Yes-hematuria, x2, with clots        Entered by: Mamadou Goldstein RN      Please review provider order for any additional goals.   Nurse to notify provider when observation goals have been met and patient is ready for discharge.       Vitals are Temp: 98  F (36.7  C) Temp src: Oral BP: 113/58 Pulse: 80 Heart Rate: 74 Resp: 18 SpO2: 98 %.  Patient is Alert and Oriented x4. They are SBA with Gait Belt and Walker.  Patient is on Enteric precuations for diarrhea and still needing stool sample.  Pt is a Regular diet.  They are denying pain.  Patient is Saline locked. Renal US completed. 2 bloody urine overnight with clots. Scant amount of bloody mucous from rectum. Still awaiting stool sample. Rocephin Q24 for UTI. Dispo pending medicine clearance.

## 2020-08-13 NOTE — PLAN OF CARE
Patient's After Visit Summary was reviewed with patient.   Patient verbalized understanding of After Visit Summary, recommended follow up and was given an opportunity to ask questions.   Discharge medications sent home with patient/family: No   Discharged with other: friend           OBSERVATION patient END time: 3:55 PM

## 2020-08-13 NOTE — PLAN OF CARE
"PRIMARY DIAGNOSIS: GASTROENTERITIS- UTI with hematuria    OUTPATIENT/OBSERVATION GOALS TO BE MET BEFORE DISCHARGE  1. Orthostatic performed: No    2. Tolerating PO fluid and/or antibiotics (if applicable): Yes    3. Nausea/Vomiting/Diarrhea symptoms improved: Yes -no nausea, no emesis, no stool since arrival to unit- per pt. 2 watery stools today    4. Pain status: Pain free.- denies pain other than some light cramping in abd    5. Return to near baseline physical activity: Yes-SBA    Discharge Planner Nurse   Safe discharge environment identified: Yes  Barriers to discharge: Yes-hematuria       Entered by: Janey Banerjee 08/12/2020      Please review provider order for any additional goals.   Nurse to notify provider when observation goals have been met and patient is ready for discharge.    AOx3. Up SBA. Denies pain. No nausea. No emesis. No diarrhea. Voided 150cc and noted that output was bright red-unable to tell if coming from urine solely or vagina. Provider notified-see sep. Note. Awaiting stool to send for enteric and C-diff- precautions in the interim. Abd US ordered routine-needs to be completed- will continue to monitor and provide supportive cares. /60 (BP Location: Left leg)   Pulse 80   Temp 99  F (37.2  C) (Oral)   Resp 20   Ht 1.441 m (4' 8.73\")   Wt 56.1 kg (123 lb 9.6 oz)   SpO2 98%   BMI 27.00 kg/m     "

## 2020-08-13 NOTE — H&P
History and Physical     Keiko Kimball MRN# [id number]   YOB: 1950 Age: 69 year old      Date of Admission:  8/12/2020    Primary care provider: Brittnee Sharma          Assessment and Plan:   Keiko Kimball is a 69 year old female with a PMH significant for GERD/HTN, hx of chronic post prandial vomiting (has sm amt of bilious vomit after meals for the last 10 yrs) hx of bariatric surgery, who presents with c/o diarrhea that started yesterday evening. She noted to have red clots in the toilet, which she suspect it;s coming from the rectum but also noted hematuria that prompted her to come to ED for further eval.     Work up in ED shows leukocytosis of 20K. But normal BMP. UA showed gross hematuria with >182 red cells and >182 WBC. CT abd shows distal colon inflammatory changes c/w colitis. She is being admitted for possible colitis.     1. Episodes of diarrhea, BRBPR with CT evidence of colitis  --Pt appears to be completely asymptomatic from an abd standpoint, she has no fever, no pain on exam. Feeling hungry and wants to eat  --Would monitor and see how she does  --No abx from a colitis standpoint for now  --given continued diarrhea, will check stool studies (no recent abx though)  --Will need outpt eventual c-scope, last one reported normal in 2012    2. Pyuria with reported gross hematuria  --Likely due to UTI, but has no sxs of dysuria or frequency/urgency  --No hx of urinary stones and no hx of UTI in the past  --Cont Rocephin for now, await urine cx  --Consider urology consult if gross hematuria continues     3. Leukocytosis--elevated at 20K but did get a steroid injection yesterday in the left shoulder  --follow WBC tomorrow     3. GERD: resume PPI    4. HTN: Resume lisinopril    Scurry to OBS   DVT prophy--ambulate for now, consider scd if staying longer  [facility]          Chief Complaint:   Diarrhea, blood in stool       History of Present Illness:   Keiko ANDERS  Jigar is a 69 year old female with a PMH significant for GERD/HTN, hx of chronic post prandial vomiting (has sm amt of bilious vomit after meals for the last 10 yrs)hx of bariatric surgery, who presents with c/o diarrhea that started yesterday evening.States she was feeling find till she started to have mild abd cramping and then followed by bouts of non bloody stool. Overnight she saw that there was red clot in the toilet and noticed blood from the urine so that alarmed her and she came in for evaluation.  She has no fever, chills, no abd pain. She has no urinary sxs either with no hx of UTI or stones.   She did have a Cortisone injection yesterday in the left shoulder.     Work up in ED shows leukocytosis of 20K. But normal BMP. UA showed gross hematuria with >182 red cells and >182 WBC. CT abd shows distal colon inflammatory changes c/w colitis. She is being admitted for possible colitis.              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     Rt side of face     Bisphosphonate-related jaw necrosis (H)      Carpal tunnel syndrome      Carpal tunnel syndrome 1998    Both MCP jts of thumbs removed     Fibromyalgia      Gastro-oesophageal reflux disease      Generalized osteoarthrosis, unspecified site      Macular degeneration ~January 2014    LEFT     Myalgia and myositis, unspecified      Osteoarthritis      Other and unspecified disc disorder of lumbar region      Other osteoporosis      Pain, arm, right     LIMITED MOBILITY OF RIGHT ARM     Peptic ulcer, unspecified site, unspecified as acute or chronic, without mention of hemorrhage or perforation 2001    pt had ulcer  found during bariatric surgery     Pseudogout      Reflex sympathetic dystrophy of the upper limb     BILATERAL- HISTORY     Reflex sympathetic dystrophy, unspecified     bilateral     Sciatica     Both lower etm     Seasonal allergic rhinitis      Torn rotator cuff     LEFT     Trigger finger (acquired)     thumb bilateral     Ulcer of esophagus     As per pt     Unspecified hearing loss     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     Unspecified vitamin D deficiency 5/2007    Pt has low vit d, high PTH, causing osteoporosis, fractures               Past Surgical History:     Past Surgical History:   Procedure Laterality Date     ARTHROPLASTY ELBOW  3/8/2012    Procedure:ARTHROPLASTY ELBOW; Right Total Elbow Arthroplasty Complex, Right Metal Revision; Surgeon:DONA STEVENSON; Location:[facility] OR     C APPENDECTOMY       C EXCIS KNEE CARTILAGE,MEDIAL & LAT  1994    Lt knee     C FOOT/TOES SURGERY PROC UNLISTED  1995    Lt foot , tendon repair     C LIGATE ETHMOID ARTERY  1997    developed facial pain syndrome post surgeries     C LIGATE INTERN MAXILL ARTERY  1997     C SHOULDER SURG PROC UNLISTED  8/2006    Rt shoulder replacement surgery     GASTRIC BYPASS  1981, 2001    bypass 25 yrs ago, revised in 2001     HC REVISE MEDIAN N/CARPAL TUNNEL SURG  1999     INTERPOSITION TENDON HAND  6/28/2012    Procedure: INTERPOSITION TENDON HAND;  Right Arm  Radial Nerve Tendon Transfers, Pronator Terres  to Extensor Carpi Radialis Brevis, Palmaris Longus to Extensor Pollicis Longus. Flexor Carpi Ulnaris to Extensor Digitorum Communis  ;  Surgeon: Dona Stevenson MD;  Location:  OR     ORTHOPEDIC SURGERY  2011    shoulder- replacement     RELEASE TRIGGER FINGER  4/23/2013    Procedure: RELEASE TRIGGER FINGER;  Left Index, Middle and Ring Trigger Finger Releases.;  Surgeon: Dona Stevenson MD;  Location: [facility]     ROTATOR CUFF REPAIR RT/LT  2004    rt side     TONSILLECTOMY                 Social History:     Social  History     Socioeconomic History     Marital status:      Spouse name: Not on file     Number of children: Not on file     Years of education: Not on file     Highest education level: Not on file   Occupational History     Not on file   Social Needs     Financial resource strain: Not on file     Food insecurity     Worry: Not on file     Inability: Not on file     Transportation needs     Medical: Not on file     Non-medical: Not on file   Tobacco Use     Smoking status: Never Smoker     Smokeless tobacco: Never Used   Substance and Sexual Activity     Alcohol use: Yes     Comment: rarely     Drug use: No     Sexual activity: Not Currently   Lifestyle     Physical activity     Days per week: Not on file     Minutes per session: Not on file     Stress: Not on file   Relationships     Social connections     Talks on phone: Not on file     Gets together: Not on file     Attends Orthodoxy service: Not on file     Active member of club or organization: Not on file     Attends meetings of clubs or organizations: Not on file     Relationship status: Not on file     Intimate partner violence     Fear of current or ex partner: Not on file     Emotionally abused: Not on file     Physically abused: Not on file     Forced sexual activity: Not on file   Other Topics Concern     Parent/sibling w/ CABG, MI or angioplasty before 65F 55M? No   Social History Narrative     Not on file               Family History:     Family History   Problem Relation Age of Onset     Cancer Mother         lymphoma     Cancer Brother         esophageal ca              Allergies:      Allergies   Allergen Reactions     Actonel [Risedronate Sodium] Anaphylaxis     Fentanyl Hives     Azithromycin Palpitations     Celebrex [Celecoxib] Rash     Asa [Aspirin]      Pt cannot use ASA, or salicylates because she has clippingsof arteries in her nose sec to bleeding     Boniva [Ibandronate Sodium]      Jaw pain, nausea , vomiting , teeth pain . Pt has  rt hip fracture      Codeine Nausea and Vomiting     Dilaudid Cough [Dilaudid Cough]      headaches     Nsaids      Pt had bypass, duodenal ulcer, esophageal ulcer, cannot take NSAIDS     Tramadol      Nausea , vomiting      Vicodin [Hydrocodone-Acetaminophen]      headaches     Vistaril Other (See Comments)     Headaches and dry heaves       Codeine Nausea and Vomiting and Rash     Hydrocodone Nausea and Vomiting and Rash     Sulfa Drugs Hives and Rash               Medications:     Prior to Admission medications    Medication Sig Last Dose Taking? Auth Provider   acetaminophen (TYLENOL) 650 MG CR tablet Take 650 mg by mouth every 8 hours as needed  Past Week at Unknown time Yes Reported, Patient   cyanocobalamin (VITAMIN B12) 1000 MCG/ML injection Inject 1 mL (1,000 mcg) into the muscle every 30 days 8/4/2020 Yes Brittnee Sharma MD   lisinopril (ZESTRIL) 10 MG tablet Take 1 tablet (10 mg) by mouth daily 8/11/2020 at am Yes Brittnee Sharma MD   medical cannabis (Patient's own supply) See Admin Instructions (The purpose of this order is to document that the patient reports taking medical cannabis.  This is not a prescription, and is not used to certify that the patient has a qualifying medical condition.)   Uses topical cream on knees and oral liquid. 8/11/2020 at Unknown time Yes Reported, Patient   Multiple Vitamins-Minerals (PRESERVISION AREDS 2) CAPS Take 240 mg by mouth 2 times daily (with meals) 8/11/2020 at pm Yes Reported, Patient   omeprazole (PRILOSEC) 40 MG DR capsule Take 1 capsule (40 mg) by mouth 2 times daily Take 30-60 minutes before a meal. 8/11/2020 at pm Yes Brittnee Sharma MD              Review of Systems:     A Comprehensive greater than 10 system review of systems was carried out.  Pertinent positives and negatives are noted above.  Otherwise negative for contributory information.          Physical Exam:   Blood pressure 113/60, pulse 80, temperature 99  F (37.2  C),  "temperature source Oral, resp. rate 20, height 1.441 m (4' 8.73\"), weight 56.1 kg (123 lb 9.6 oz), SpO2 98 %, not currently breastfeeding.  Exam:  GENERAL:  Comfortable. Non toxic appearing, asking to eat  PSYCH: pleasant, oriented, No acute distress.  HEENT:  PERRLA. Normal conjunctiva, normal hearing, nasal mucosa and Oropharynx are normal.  NECK:  Supple, no neck vein distention, adenopathy or bruits, normal thyroid.  HEART:  Normal S1, S2 with no murmur, no pericardial rub, gallops or S3 or S4.  LUNGS:  Clear to auscultation, normal Respiratory effort. No wheezing, rales or ronchi.  ABDOMEN:  Soft, no hepatosplenomegaly, normal bowel sounds. Non-tender, non distended.   EXTREMITIES:  No pedal edema, +2 pulses bilateral and equal.  SKIN:  Dry to touch, No rash, wound or ulcerations.  NEUROLOGIC:  CN 2-12 intact, BL 5/5 symmetric upper and lower extremity strength, sensation is intact with no focal deficits.          Data:     Recent Labs   Lab 08/12/20  1131   WBC 20.9*   HGB 13.8   HCT 42.4   MCV 94        Recent Labs   Lab 08/12/20  1131      POTASSIUM 3.6   CHLORIDE 106   CO2 26   ANIONGAP 6   *   BUN 16   CR 0.63   GFRESTIMATED >90   GFRESTBLACK >90   EMERY 9.1     Recent Labs   Lab 08/12/20  1156   CULT PENDING       Recent Labs   Lab 08/12/20  1156   COLOR Red   APPEARANCE Cloudy   URINEGLC Negative   URINEBILI Negative   URINEKETONE Negative   SG 1.028   UBLD Large*   URINEPH 6.5   PROTEIN 300*   NITRITE Negative   LEUKEST Moderate*   RBCU >182*   WBCU >182*         Results for orders placed or performed during the hospital encounter of 08/12/20   Abd/pelvis CT,  IV  contrast only TRAUMA / AAA    Narrative    CT ABDOMEN/PELVIS WITH CONTRAST August 12, 2020 12:51 PM    CLINICAL HISTORY: Abdominal pain/diarrhea.    TECHNIQUE: CT scan of the abdomen and pelvis was performed following  injection of IV contrast. Multiplanar reformats were obtained. Dose  reduction techniques were " used.  CONTRAST: 60mL Isovue-370    COMPARISON: None.    FINDINGS:   LOWER CHEST: Normal.    HEPATOBILIARY: Normal.    PANCREAS: Normal.    SPLEEN: No acute abnormality. Tiny splenic artery aneurysm suggested  at the splenic hilum measuring 0.7 cm series 4 image 29.    ADRENAL GLANDS: Normal.    KIDNEYS/BLADDER: Normal.    BOWEL: There is prominent inflammatory wall thickening involving the  distal half of the transverse colon extending to the splenic flexure  and also involving the descending colon. There is no obstruction,  abscess, or free air. Postoperative change at the stomach.    PELVIC ORGANS: Incidental left ovarian/adnexal cyst that is 2.1 cm  series 4 image 130.    ADDITIONAL FINDINGS: Vascular calcifications.    MUSCULOSKELETAL: No acute abnormality. Spine degenerative changes.  Vertebroplasty at L2. Right proximal femoral surgical change. Subacute  pelvic fractures.      Impression    IMPRESSION:   1.  Prominent inflammatory wall thickening at the colon from the mid  transverse colon, and also involving the splenic flexure and  descending colon. This may relate to an infectious or inflammatory  colitis. Ischemic colitis remains in the differential as well.  2.  No additional acute abnormality.  3.  Left ovarian/adnexal cystic lesion identified.    MD Aparna YADAV PA-C

## 2020-08-13 NOTE — DISCHARGE SUMMARY
[facility]    Observation Unit  Discharge Summary  Hospitalist    Date of Admission:  8/12/2020  Date of Discharge:  8/13/2020  Provider:  Lisbeth Silva PA-C  Date of Service (when I last saw the patient): 08/13/20    Discharge Diagnoses   Colitis, suspect viral, resolved  UTI with hematuria   Leukocytosis     Other medical issues:  Past Medical History:   Diagnosis Date     Anesthesia complication     cornea scratched left eye     Atypical face pain      Bariatric surgery status 2001    initial surgery staples in 1981, revised in 2001     Bell's palsy 5/18/2010    When pt is 20 yrs old rt side of face      Better eye: severe vision impairment; lesser eye: blind, 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     Rt side of face     Bisphosphonate-related jaw necrosis (H)      Carpal tunnel syndrome      Carpal tunnel syndrome 1998    Both MCP jts of thumbs removed     Fibromyalgia      Gastro-oesophageal reflux disease      Generalized osteoarthrosis, unspecified site      Macular degeneration ~January 2014    LEFT     Myalgia and myositis, unspecified      Osteoarthritis      Other and unspecified disc disorder of lumbar region      Other osteoporosis      Pain, arm, right     LIMITED MOBILITY OF RIGHT ARM     Peptic ulcer, unspecified site, unspecified as acute or chronic, without mention of hemorrhage or perforation 2001    pt had ulcer found during bariatric surgery     Pseudogout      Reflex sympathetic dystrophy of the upper limb     BILATERAL- HISTORY     Reflex sympathetic dystrophy, unspecified     bilateral     Sciatica     Both lower etm     Seasonal allergic rhinitis      Torn rotator cuff     LEFT     Trigger finger (acquired)     thumb bilateral     Ulcer of esophagus     As per pt     Unspecified hearing loss     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     Unspecified vitamin D deficiency 5/2007    Pt has low vit d, high PTH, causing osteoporosis, fractures     History of Present Illness   Keiko Kimball is a 69 year old  female with a PMH significant for GERD/HTN, hx of chronic post prandial vomiting (has small amount of bilious vomit after meals for the last 10 yrs), h/o bariatric surgery who presents with c/o vomiting, diarrhea, and hematuria that started on 8/12. Please see the admission history and physical for full details.    Hospital Course   Keiko Kimball was admitted on 8/12/2020.  The following problems were addressed during her hospitalization:    #Colitis, suspect viral, resolved: acute onset of diarrhea prior to admission that lasted <24 hours, no episodes while admitted thus no stool studies performed. Likely viral course. CT of abdomen showed evidence of colitis. Recommend outpatient colonoscopy.     #UTI with hematuria: abnormal UA, reported pressure with urinating and new onset hematuria with clots, increased vomiting, and diaphoresis. Urine clearing on own prior to discharge after receiving IV Rocephin, completed 2 doses while admitted. Suspect cause for leukocytosis that was trending down. Low grade fever of 100 during stay. No current urine culture results prior to discharge and patient is not willing to stay to wait for these results, will contact if antibiotic needs to be changed.   - PO Cefdinir 300 mg BID for 5 days  - Follow urine culture results    #Leukocytosis: initial leukocytosis of 20.9 with improvement to 16.5 prior to discharge. Received intraarticular steroid injection on 8/11 at [facility], may be contributing but unlikely main cause, suspect this is related to UTI and/or stress response from diarrhea prior to admission.   - Recheck CBC on 8/17 to monitor for resolution     Pending Results   Unresulted Labs Ordered in the Past 30 Days of this Admission     No orders found from 7/13/2020 to 8/13/2020.        Code Status   Full Code       Primary Care Physician   Brittnee Sharma    Exam:    /70 (BP Location: Left arm)   Pulse 80   Temp 97  F (36.1  C) (Oral)   Resp 16   Ht 1.441 m (4'  "8.73\")   Wt 56.1 kg (123 lb 9.6 oz)   SpO2 97%   BMI 27.00 kg/m    GENERAL:  Comfortable.  PSYCH: pleasant, oriented, No acute distress.  HEENT:  PERRLA. Normal conjunctiva, normal hearing, nasal mucosa and oropharynx are normal.  NECK:  Supple, no neck vein distention, adenopathy or bruits, normal thyroid.  HEART:  Normal S1, S2 with no murmur, no pericardial rub, gallops or S3 or S4.  LUNGS:  Clear to auscultation, normal respiratory effort. No wheezing, rales or ronchi.  ABDOMEN:  Soft, no hepatosplenomegaly, normal bowel sounds. Non-tender, non distended.   EXTREMITIES:  No pedal edema, +2 radial and posterior tibial pulses bilateral and equal.  SKIN:  Dry to touch, No rash, wound or ulcerations.  NEUROLOGIC:  CN 2-12 intact, BL 5/5 symmetric upper and lower extremity strength, sensation is intact with no focal deficits.     Discharge Disposition   Discharged to home    Consultations This Hospital Stay   None    Time Spent on this Encounter   I, Sandra Silva PA-C, personally saw the patient today and spent greater than 30 minutes discharging this patient.    Discharge Orders      CBC with platelets differential    Last Lab Result: Hemoglobin (g/dL)       Date                     Value                 08/13/2020               11.1 (L)         ----------     Follow-up and recommended labs and tests     Follow up with primary care provider, Brittnee Sharma, within 7 days for hospital follow-up.  The following labs/tests are recommended: CBC.     Reason for your hospital stay    You were admitted for concerns of vomiting, diarrhea, and blood in your urine. Based on your workup it appears your diarrhea was related to a colitis (inflammation of your colon) that was self limiting and resolved since admission. Your vomiting, blood in urine, and pressure when voiding are likely related to a urinary tract infection. You were started on IV antibiotics which we have transitioned to oral antibiotics. It was " recommended you stay in the hospital to wait for urine culture results and follow your labs but you preferred discharging home with outpatient follow up. We will follow your urine culture results and if we need to change antibiotics we will call you.     Activity    Your activity upon discharge: activity as tolerated     When to contact your care team    Call your primary doctor or return to the emergency room if you have any of the following: temperature greater than 101, fevers, worsening hematuria with clots, or unable to urinate.     Discharge Instructions    Based on CT scan findings of colitis, would recommend discussing with primary care provider about having colonoscopy to further assess.     Full Code     Diet    Follow this diet upon discharge: Orders Placed This Encounter      Regular Diet Adult     Discharge Medications   Discharge Medication List as of 8/13/2020  3:42 PM      START taking these medications    Details   cefdinir (OMNICEF) 300 MG capsule Take 1 capsule (300 mg) by mouth 2 times daily for 5 days, Disp-10 capsule,R-0, E-Prescribe         CONTINUE these medications which have NOT CHANGED    Details   acetaminophen (TYLENOL) 650 MG CR tablet Take 650 mg by mouth every 8 hours as needed , Historical      cyanocobalamin (VITAMIN B12) 1000 MCG/ML injection Inject 1 mL (1,000 mcg) into the muscle every 30 days, Disp-1 mL, R-11, Injection      lisinopril (ZESTRIL) 10 MG tablet Take 1 tablet (10 mg) by mouth daily, Disp-90 tablet,R-1, E-Prescribe      medical cannabis (Patient's own supply) See Admin Instructions (The purpose of this order is to document that the patient reports taking medical cannabis.  This is not a prescription, and is not used to certify that the patient has a qualifying medical condition.)   Uses topical cream on knees  and oral liquid., Historical      Multiple Vitamins-Minerals (PRESERVISION AREDS 2) CAPS Take 240 mg by mouth 2 times daily (with meals), Historical       omeprazole (PRILOSEC) 40 MG DR capsule Take 1 capsule (40 mg) by mouth 2 times daily Take 30-60 minutes before a meal., Disp-180 capsule,R-3, E-Prescribe           Allergies   Allergies   Allergen Reactions     Actonel [Risedronate Sodium] Anaphylaxis     Fentanyl Hives     Azithromycin Palpitations     Celebrex [Celecoxib] Rash     Asa [Aspirin]      Pt cannot use ASA, or salicylates because she has clippingsof arteries in her nose sec to bleeding     Boniva [Ibandronate Sodium]      Jaw pain, nausea , vomiting , teeth pain . Pt has rt hip fracture      Codeine Nausea and Vomiting     Dilaudid Cough [Dilaudid Cough]      headaches     Nsaids      Pt had bypass, duodenal ulcer, esophageal ulcer, cannot take NSAIDS     Tramadol      Nausea , vomiting      Vicodin [Hydrocodone-Acetaminophen]      headaches     Vistaril Other (See Comments)     Headaches and dry heaves       Codeine Nausea and Vomiting and Rash     Hydrocodone Nausea and Vomiting and Rash     Sulfa Drugs Hives and Rash     Data   Results for orders placed or performed during the hospital encounter of 08/12/20   Abd/pelvis CT,  IV  contrast only TRAUMA / AAA     Status: None    Narrative    CT ABDOMEN/PELVIS WITH CONTRAST August 12, 2020 12:51 PM    CLINICAL HISTORY: Abdominal pain/diarrhea.    TECHNIQUE: CT scan of the abdomen and pelvis was performed following  injection of IV contrast. Multiplanar reformats were obtained. Dose  reduction techniques were used.  CONTRAST: 60mL Isovue-370    COMPARISON: None.    FINDINGS:   LOWER CHEST: Normal.    HEPATOBILIARY: Normal.    PANCREAS: Normal.    SPLEEN: No acute abnormality. Tiny splenic artery aneurysm suggested  at the splenic hilum measuring 0.7 cm series 4 image 29.    ADRENAL GLANDS: Normal.    KIDNEYS/BLADDER: Normal.    BOWEL: There is prominent inflammatory wall thickening involving the  distal half of the transverse colon extending to the splenic flexure  and also involving the descending  colon. There is no obstruction,  abscess, or free air. Postoperative change at the stomach.    PELVIC ORGANS: Incidental left ovarian/adnexal cyst that is 2.1 cm  series 4 image 130.    ADDITIONAL FINDINGS: Vascular calcifications.    MUSCULOSKELETAL: No acute abnormality. Spine degenerative changes.  Vertebroplasty at L2. Right proximal femoral surgical change. Subacute  pelvic fractures.      Impression    IMPRESSION:   1.  Prominent inflammatory wall thickening at the colon from the mid  transverse colon, and also involving the splenic flexure and  descending colon. This may relate to an infectious or inflammatory  colitis. Ischemic colitis remains in the differential as well.  2.  No additional acute abnormality.  3.  Left ovarian/adnexal cystic lesion identified.    FER ZAMBRANO MD   US Renal Complete     Status: None    Narrative    EXAM: US RENAL COMPLETE  LOCATION: [facility]  DATE/TIME: 8/13/2020 5:24 AM    INDICATION: Hematuria.  COMPARISON: 08/12/2012  TECHNIQUE: Routine Bilateral Renal and Bladder Ultrasound.    FINDINGS:    RIGHT KIDNEY: 8.1 cm. No hydronephrosis.     LEFT KIDNEY: 8.1 cm. No hydronephrosis.     BLADDER: Partially distended. Right ureteral jet visualized.      Impression    IMPRESSION:  1.  No hydronephrosis.  2.  Left ureteral jet not visualized.   CBC with platelets differential     Status: Abnormal   Result Value Ref Range    WBC 20.9 (H) 4.0 - 11.0 10e9/L    RBC Count 4.53 3.8 - 5.2 10e12/L    Hemoglobin 13.8 11.7 - 15.7 g/dL    Hematocrit 42.4 35.0 - 47.0 %    MCV 94 78 - 100 fl    MCH 30.5 26.5 - 33.0 pg    MCHC 32.5 31.5 - 36.5 g/dL    RDW 13.8 10.0 - 15.0 %    Platelet Count 322 150 - 450 10e9/L    Diff Method Automated Method     % Neutrophils 92.3 %    % Lymphocytes 3.2 %    % Monocytes 3.8 %    % Eosinophils 0.0 %    % Basophils 0.1 %    % Immature Granulocytes 0.6 %    Nucleated RBCs 0 0 /100    Absolute Neutrophil 19.3 (H) 1.6 - 8.3 10e9/L    Absolute  Lymphocytes 0.7 (L) 0.8 - 5.3 10e9/L    Absolute Monocytes 0.8 0.0 - 1.3 10e9/L    Absolute Eosinophils 0.0 0.0 - 0.7 10e9/L    Absolute Basophils 0.0 0.0 - 0.2 10e9/L    Abs Immature Granulocytes 0.1 0 - 0.4 10e9/L    Absolute Nucleated RBC 0.0    Comprehensive metabolic panel     Status: Abnormal   Result Value Ref Range    Sodium 138 133 - 144 mmol/L    Potassium 3.6 3.4 - 5.3 mmol/L    Chloride 106 94 - 109 mmol/L    Carbon Dioxide 26 20 - 32 mmol/L    Anion Gap 6 3 - 14 mmol/L    Glucose 127 (H) 70 - 99 mg/dL    Urea Nitrogen 16 7 - 30 mg/dL    Creatinine 0.63 0.52 - 1.04 mg/dL    GFR Estimate >90 >60 mL/min/[1.73_m2]    GFR Estimate If Black >90 >60 mL/min/[1.73_m2]    Calcium 9.1 8.5 - 10.1 mg/dL    Bilirubin Total 1.7 (H) 0.2 - 1.3 mg/dL    Albumin 3.8 3.4 - 5.0 g/dL    Protein Total 7.1 6.8 - 8.8 g/dL    Alkaline Phosphatase 114 40 - 150 U/L    ALT 19 0 - 50 U/L    AST 25 0 - 45 U/L   UA with Microscopic     Status: Abnormal   Result Value Ref Range    Color Urine Red     Appearance Urine Cloudy     Glucose Urine Negative NEG^Negative mg/dL    Bilirubin Urine Negative NEG^Negative    Ketones Urine Negative NEG^Negative mg/dL    Specific Gravity Urine 1.028 1.003 - 1.035    Blood Urine Large (A) NEG^Negative    pH Urine 6.5 5.0 - 7.0 pH    Protein Albumin Urine 300 (A) NEG^Negative mg/dL    Urobilinogen mg/dL 2.0 0.0 - 2.0 mg/dL    Nitrite Urine Negative NEG^Negative    Leukocyte Esterase Urine Moderate (A) NEG^Negative    Source Midstream Urine     WBC Urine >182 (H) 0 - 5 /HPF    RBC Urine >182 (H) 0 - 2 /HPF    Bacteria Urine Few (A) NEG^Negative /HPF    Squamous Epithelial /HPF Urine 4 (H) 0 - 1 /HPF    Mucous Urine Present (A) NEG^Negative /LPF   Symptomatic COVID-19 Virus (Coronavirus) by PCR     Status: None    Specimen: Nasopharyngeal   Result Value Ref Range    COVID-19 Virus PCR to [facility] - Source Nasopharyngeal     COVID-19 Virus PCR to [facility] - Result Not Detected    Basic metabolic panel      Status: Abnormal   Result Value Ref Range    Sodium 138 133 - 144 mmol/L    Potassium 3.6 3.4 - 5.3 mmol/L    Chloride 108 94 - 109 mmol/L    Carbon Dioxide 26 20 - 32 mmol/L    Anion Gap 4 3 - 14 mmol/L    Glucose 98 70 - 99 mg/dL    Urea Nitrogen 14 7 - 30 mg/dL    Creatinine 0.50 (L) 0.52 - 1.04 mg/dL    GFR Estimate >90 >60 mL/min/[1.73_m2]    GFR Estimate If Black >90 >60 mL/min/[1.73_m2]    Calcium 8.0 (L) 8.5 - 10.1 mg/dL   CBC with platelets     Status: Abnormal   Result Value Ref Range    WBC 16.5 (H) 4.0 - 11.0 10e9/L    RBC Count 3.61 (L) 3.8 - 5.2 10e12/L    Hemoglobin 11.1 (L) 11.7 - 15.7 g/dL    Hematocrit 35.0 35.0 - 47.0 %    MCV 97 78 - 100 fl    MCH 30.7 26.5 - 33.0 pg    MCHC 31.7 31.5 - 36.5 g/dL    RDW 14.3 10.0 - 15.0 %    Platelet Count 258 150 - 450 10e9/L   Urine Culture Aerobic Bacterial     Status: None    Specimen: Midstream Urine   Result Value Ref Range    Specimen Description Midstream Urine     Special Requests Specimen received in preservative     Culture Micro       10,000 to 50,000 colonies/mL  mixed urogenital marjan  Susceptibility testing not routinely done       Sandra Silva PA-C

## 2020-08-13 NOTE — PLAN OF CARE
GASTROENTERITIS- UTI with hematuria     OUTPATIENT/OBSERVATION GOALS TO BE MET BEFORE DISCHARGE  1. Orthostatic performed: No     2. Tolerating PO fluid and/or antibiotics (if applicable): Yes     3. Nausea/Vomiting/Diarrhea symptoms improved: Yes, denies N/V/D     4. Pain status: Denies pain     5. Return to near baseline physical activity: Yes-SBA     Discharge Planner Nurse   Safe discharge environment identified: Yes  Barriers to discharge: No        Entered by: Ton Cavazos RN      Please review provider order for any additional goals.   Nurse to notify provider when observation goals have been met and patient is ready for discharge.       Vitals are Temp: 97  F (36.1  C) Temp src: Oral BP: 132/70   Heart Rate: 78 Resp: 16 SpO2: 97 %.  Patient is Alert and Oriented x4. SBA with Gait Belt and Walker. Denies pain. Regular diet.   Patient is Saline locked. Rocephin Q24 for UTI. Will discharge after 4 pm today. Will continue to monitor and provide cares

## 2020-08-13 NOTE — PROVIDER NOTIFICATION
Patient voided 150cc bright red blood-bleeding appears to be only coming from urine-not rectum. Pt. Denies any burning, pain, etc and has no complaints at this time. T-100.0 noted, last one 99.0. Provider notified-abd US ordered.

## 2020-08-13 NOTE — PLAN OF CARE
GASTROENTERITIS- UTI with hematuria     OUTPATIENT/OBSERVATION GOALS TO BE MET BEFORE DISCHARGE  1. Orthostatic performed: No     2. Tolerating PO fluid and/or antibiotics (if applicable): Yes     3. Nausea/Vomiting/Diarrhea symptoms improved: Yes, denies N/V/D     4. Pain status: Pain free.- denies pain other than some light cramping in abd     5. Return to near baseline physical activity: Yes-SBA     Discharge Planner Nurse   Safe discharge environment identified: Yes  Barriers to discharge: Yes-hematuria, x2, with clots        Entered by: Mamadou Goldstein RN      Please review provider order for any additional goals.   Nurse to notify provider when observation goals have been met and patient is ready for discharge.

## 2020-08-14 LAB
SARS-COV-2 RNA SPEC QL NAA+PROBE: NOT DETECTED
SPECIMEN SOURCE: NORMAL

## 2020-08-17 DIAGNOSIS — Z71.89 OTHER SPECIFIED COUNSELING: ICD-10-CM

## 2020-08-19 ENCOUNTER — TELEPHONE (OUTPATIENT)
Dept: FAMILY MEDICINE | Facility: CLINIC | Age: 70
End: 2020-08-19

## 2020-08-19 NOTE — TELEPHONE ENCOUNTER
8/19/2020    Pt is asking to speak to Shruthi RE: hosp stay.    Please call Pt @ [phone] today before 11AM

## 2020-08-19 NOTE — TELEPHONE ENCOUNTER
Discussed patients recent hospital stay.  Follow up lab only appointment scheduled.    Shruthi Russo RN

## 2020-08-20 ENCOUNTER — TELEPHONE (OUTPATIENT)
Dept: FAMILY MEDICINE | Facility: CLINIC | Age: 70
End: 2020-08-20

## 2020-08-20 DIAGNOSIS — M24.411 CHRONIC DISLOCATION OF SHOULDER, RIGHT: Primary | ICD-10-CM

## 2020-08-20 NOTE — TELEPHONE ENCOUNTER
[facility] Calls ([place])    Routed to Dr. Sharma, please see pended order and sign for right arm brace for chronic shoulder dislocation.    Jason Medellin RN

## 2020-08-21 ENCOUNTER — TELEPHONE (OUTPATIENT)
Dept: FAMILY MEDICINE | Facility: CLINIC | Age: 70
End: 2020-08-21

## 2020-08-24 ENCOUNTER — THERAPY VISIT (OUTPATIENT)
Dept: OCCUPATIONAL THERAPY | Facility: CLINIC | Age: 70
End: 2020-08-24
Payer: MEDICARE

## 2020-08-24 DIAGNOSIS — M79.601 PAIN OF RIGHT UPPER EXTREMITY: Primary | ICD-10-CM

## 2020-08-24 PROCEDURE — 97760 ORTHOTIC MGMT&TRAING 1ST ENC: CPT | Mod: GO | Performed by: OCCUPATIONAL THERAPIST

## 2020-08-24 NOTE — PROGRESS NOTES
Hand Therapy Initial Evaluation  Current Date:  8/24/2020    Diagnosis:   R arm pain  DOI:  8/20/20 (MD order date)    Subjective:  Keiko Kimball is a 69 year old right hand dominant female.    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 Since onset symptoms are unchanged.  Special tests:  x-ray.  Previous treatment: hand therapy and splinting. General health as reported by patient is good.  Pertinent medical history includes: osteoporosis, fibromyalgia. Medical allergies: See EMR. Surgical history: none listed  Medication history: see EMR.    Occupational Profile Information:  Current occupation is retired  Primary home tasks: repetitive tasks  Prior functional level:  no limitations  Barriers include: transportation, live alone  Mobility: Uses wheelchair  Transportation: unable to use normal mode of transportation, due to current injury  Leisure activities/hobbies: none    Upper Extremity Functional Index Score:  SCORE:   Column Totals: /80: 36   (A lower score indicates greater disability.)    Objective:  Pain Level (Scale 0-10):   8/24/2020   At Rest 4/10   With Use 9/10     Pain Description:  Date 8/24/2020   Location R forearm   Pain Quality Aching and Sharp   Frequency constant     Pain is worst daytime or nighttime   Exacerbated by rest, kitchen tasks   Relieved by heat   Progression unchanged     ROM: not assessed, pt reports her right arm is not functional and has no active motion, pt does have full finger AROM    Strength: contraindicated due to lack of ROM    Assessment/Plan:  Patient presents with symptoms consistent with diagnosis of right arm pain, with surgical  intervention.     Rehab Potential:  Good - Return to full activity, some limitations    Patient's limitations or Problem List includes:  Pain and Decreased ROM/motion of the right elbow which interferes  with the patient's ability to perform Self Care Tasks (dressing, eating), Household Chores and Driving  as compared to previous level of function.    Patient will benefit from skilled Occupational Therapy to decrease pain to return to previous activity level and resume normal daily tasks and to reach their rehab potential.    Barriers to Learning:  No barrier    Communication Issues:  Patient appears to be able to clearly communicate and understand verbal and written communication and follow directions correctly.    Assessment of Occupational Performance:  5 or more Performance Deficits  Identified Performance Deficits: bathing/showering, toileting, dressing, feeding, functional mobility, hygiene and grooming, home establishment and management, meal preparation and cleanup, shopping, sleep and leisure activities      Clinical Decision Making (Complexity): Low complexity    Treatment Explanation:  The following has been discussed with the patient:  RX ordered/plan of care  Anticipated outcomes  Possible risks and side effects    P: Frequency:  1 x visit  Duration:  NA; 1 x visit, Pt to continue HEP individually.  Pt to return for splint adjustments.  If not return, D/C [facility]    Treatment Plan and HEP:  Orthotic Fabrication:  Long Arm dorsal resting orthosis    Discharge Plan:  Achieve all LTG.  Independent in home treatment program.  Reach maximal therapeutic benefit.    Please refer to the daily flowsheet for treatment today and total treatment time.

## 2020-08-24 NOTE — LETTER
DEPARTMENT OF HEALTH AND HUMAN SERVICES  CENTERS FOR MEDICARE & MEDICAID SERVICES    PLAN/UPDATED PLAN OF PROGRESS FOR OUTPATIENT REHABILITATION    PATIENTS NAME:  Keiko Kimball   : 1950  PROVIDER NUMBER:  [id number]  [id number]  PROVIDER NAME: [facility]  MEDICAL RECORD NUMBER: [id number]   START OF CARE DATE:    SOC Date: 20   TYPE:  OT  PRIMARY/TREATMENT DIAGNOSIS:Pain of right upper extremity  VISITS FROM START OF CARE:  Rxs Used: 1     Hand Therapy Initial Evaluation  Current Date:  2020    Diagnosis:   R arm pain  DOI:  20 (MD order date)    Subjective:  Keiko Kimball is a 69 year old right hand dominant female.    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 Since onset symptoms are unchanged.  Special tests:  x-ray.  Previous treatment: hand therapy and splinting. General health as reported by patient is good.  Pertinent medical history includes: osteoporosis, fibromyalgia. Medical allergies: See EMR. Surgical history: none listed  Medication history: see EMR.    Occupational Profile Information:  Current occupation is retired  Primary home tasks: repetitive tasks  Prior functional level:  no limitations  Barriers include: transportation, live alone  Mobility: Uses wheelchair  Transportation: unable to use normal mode of transportation, due to current injury  Leisure activities/hobbies: none    Upper Extremity Functional Index Score:  SCORE:   Column Totals: /80: 36   (A lower score indicates greater disability.)    Objective:  Pain Level (Scale 0-10):   2020   At Rest 4/10   With Use 9/10         PATIENTS NAME:  Keiko Kimball   : 1950        Pain Description:  Date 2020   Location R forearm   Pain Quality Aching and Sharp   Frequency constant     Pain is worst daytime or nighttime   Exacerbated by rest, kitchen  tasks   Relieved by heat   Progression unchanged     ROM: not assessed, pt reports her right arm is not functional and has no active motion, pt does have full finger AROM    Strength: contraindicated due to lack of ROM    Assessment/Plan:  Patient presents with symptoms consistent with diagnosis of right arm pain, with surgical  intervention.     Rehab Potential:  Good - Return to full activity, some limitations    Patient's limitations or Problem List includes:  Pain and Decreased ROM/motion of the right elbow which interferes with the patient's ability to perform Self Care Tasks (dressing, eating), Household Chores and Driving  as compared to previous level of function.    Patient will benefit from skilled Occupational Therapy to decrease pain to return to previous activity level and resume normal daily tasks and to reach their rehab potential.    Barriers to Learning:  No barrier    Communication Issues:  Patient appears to be able to clearly communicate and understand verbal and written communication and follow directions correctly.    Assessment of Occupational Performance:  5 or more Performance Deficits  Identified Performance Deficits: bathing/showering, toileting, dressing, feeding, functional mobility, hygiene and grooming, home establishment and management, meal preparation and cleanup, shopping, sleep and leisure activities      Clinical Decision Making (Complexity): Low complexity    Treatment Explanation:  The following has been discussed with the patient:  RX ordered/plan of care  Anticipated outcomes  Possible risks and side effects    P: Frequency:  1 x visit    PATIENTS NAME:  Keiko Kimball   : 1950        Duration:  NA; 1 x visit, Pt to continue HEP individually.  Pt to return for splint adjustments.  If not return, D/C [facility]    Treatment Plan and HEP:  Orthotic Fabrication:  Long Arm dorsal resting orthosis    Discharge Plan:  Achieve all LTG.  Independent in home treatment program.  Reach  "maximal therapeutic benefit.    Please refer to the daily flowsheet for treatment today and total treatment time.        Caregiver Signature/Credentials ______________________________ Date ________       Treating Provider: BRIANNA Ludwig, LORENZO      I have reviewed and certified the need for these services and plan of treatment while under my care.        PHYSICIAN'S SIGNATURE:   _____________________________________Date___________      Brittnee Sahrma MD    Certification period: Beginning of Cert date period: 08/24/20 End of Cert period date: 08/24/20     Functional Level Progress Report: Please see attached \"Goal Flow sheet for Functional level.\"    _______ Continue Services or       ____X____ DC Services                Service dates: SOC Date: 08/24/20  to present                                                                     "

## 2020-08-31 PROBLEM — M79.601 PAIN OF RIGHT UPPER EXTREMITY: Status: RESOLVED | Noted: 2017-03-07 | Resolved: 2020-08-31

## 2020-09-01 ENCOUNTER — ALLIED HEALTH/NURSE VISIT (OUTPATIENT)
Dept: FAMILY MEDICINE | Facility: CLINIC | Age: 70
End: 2020-09-01
Payer: MEDICARE

## 2020-09-01 VITALS — DIASTOLIC BLOOD PRESSURE: 62 MMHG | HEART RATE: 60 BPM | SYSTOLIC BLOOD PRESSURE: 110 MMHG

## 2020-09-01 DIAGNOSIS — R31.9 URINARY TRACT INFECTION WITH HEMATURIA, SITE UNSPECIFIED: ICD-10-CM

## 2020-09-01 DIAGNOSIS — N39.0 URINARY TRACT INFECTION WITH HEMATURIA, SITE UNSPECIFIED: ICD-10-CM

## 2020-09-01 LAB
BASOPHILS # BLD AUTO: 0 10E9/L (ref 0–0.2)
BASOPHILS NFR BLD AUTO: 0.1 %
DIFFERENTIAL METHOD BLD: NORMAL
EOSINOPHIL # BLD AUTO: 0.1 10E9/L (ref 0–0.7)
EOSINOPHIL NFR BLD AUTO: 0.7 %
ERYTHROCYTE [DISTWIDTH] IN BLOOD BY AUTOMATED COUNT: 15 % (ref 10–15)
HCT VFR BLD AUTO: 42.1 % (ref 35–47)
HGB BLD-MCNC: 14 G/DL (ref 11.7–15.7)
LYMPHOCYTES # BLD AUTO: 1.5 10E9/L (ref 0.8–5.3)
LYMPHOCYTES NFR BLD AUTO: 14.6 %
MCH RBC QN AUTO: 31.4 PG (ref 26.5–33)
MCHC RBC AUTO-ENTMCNC: 33.3 G/DL (ref 31.5–36.5)
MCV RBC AUTO: 94 FL (ref 78–100)
MONOCYTES # BLD AUTO: 0.7 10E9/L (ref 0–1.3)
MONOCYTES NFR BLD AUTO: 6.8 %
NEUTROPHILS # BLD AUTO: 7.8 10E9/L (ref 1.6–8.3)
NEUTROPHILS NFR BLD AUTO: 77.8 %
PLATELET # BLD AUTO: 335 10E9/L (ref 150–450)
RBC # BLD AUTO: 4.46 10E12/L (ref 3.8–5.2)
WBC # BLD AUTO: 10 10E9/L (ref 4–11)

## 2020-09-01 PROCEDURE — 99207 ZZC NO CHARGE NURSE ONLY: CPT

## 2020-09-01 PROCEDURE — 36415 COLL VENOUS BLD VENIPUNCTURE: CPT | Performed by: PHYSICIAN ASSISTANT

## 2020-09-01 PROCEDURE — 96372 THER/PROPH/DIAG INJ SC/IM: CPT

## 2020-09-01 PROCEDURE — 85025 COMPLETE CBC W/AUTO DIFF WBC: CPT | Performed by: PHYSICIAN ASSISTANT

## 2020-09-01 RX ADMIN — CYANOCOBALAMIN 1000 MCG: 1000 INJECTION, SOLUTION INTRAMUSCULAR; SUBCUTANEOUS at 13:42

## 2020-09-01 NOTE — PROGRESS NOTES
Keiko Kimball is a 69 year old patient who comes in today for a Blood Pressure check.  Initial BP:  /62 (BP Location: Left arm, Patient Position: Sitting, Cuff Size: Adult Large)   Pulse 60      60  Disposition: results routed to provider

## 2020-10-06 ENCOUNTER — TELEPHONE (OUTPATIENT)
Dept: FAMILY MEDICINE | Facility: CLINIC | Age: 70
End: 2020-10-06

## 2020-10-06 ENCOUNTER — ALLIED HEALTH/NURSE VISIT (OUTPATIENT)
Dept: FAMILY MEDICINE | Facility: CLINIC | Age: 70
End: 2020-10-06
Payer: MEDICARE

## 2020-10-06 VITALS — SYSTOLIC BLOOD PRESSURE: 90 MMHG | DIASTOLIC BLOOD PRESSURE: 66 MMHG

## 2020-10-06 DIAGNOSIS — E53.8 VITAMIN B12 DEFICIENCY WITHOUT ANEMIA: Primary | ICD-10-CM

## 2020-10-06 PROCEDURE — 99207 PR NO CHARGE NURSE ONLY: CPT

## 2020-10-06 PROCEDURE — 96372 THER/PROPH/DIAG INJ SC/IM: CPT

## 2020-10-06 RX ADMIN — CYANOCOBALAMIN 1000 MCG: 1000 INJECTION, SOLUTION INTRAMUSCULAR; SUBCUTANEOUS at 14:02

## 2020-10-06 NOTE — PROGRESS NOTES
Clinic Administered Medication Documentation      Injectable Medication Documentation    Patient was given Cyanocobalamin (B-12). Prior to medication administration, verified patients identity using patient s name and date of birth. Please see MAR and medication order for additional information. Patient instructed to remain in clinic for 15 minutes.      Was entire vial of medication used? Yes  Vial/Syringe: Single dose vial  Expiration Date:  06/2021  Was this medication supplied by the patient? No   Seema Walker, CMA

## 2020-10-06 NOTE — TELEPHONE ENCOUNTER
Called pt, has appointment scheduled, may disregard    Next 5 appointments (look out 90 days)    Oct 14, 2020 10:00 AM  (Arrive by 9:40 AM)  Office Visit with Brittnee Sharma MD  [facility] ([facility]) [address] [place] [id number] [phone]        Afshan Foster RN, BSN  Message handled by CLINIC NURSE.

## 2020-10-06 NOTE — TELEPHONE ENCOUNTER
General Call:     Who is calling:  patient    Reason for Call:  Needs appointment for med check, has other concerns needs to discuss-was in ED in August, Dr. Sharma is currently scheduled out until 11/18 or 12/2. Pt is wondering if there is anyway Dr. Sharma is able to fit her in sooner. Please advise and return call, pt is coming in today at 2 pm for her B12 and said she can talk about then as well.     What are your questions or concerns:  none    Date of last appointment with provider: 4/30/2020    Okay to leave a detailed message:No at Home number on file [phone] (home)       Keke Gould-Patient Rep

## 2020-10-14 ENCOUNTER — TELEPHONE (OUTPATIENT)
Dept: FAMILY MEDICINE | Facility: CLINIC | Age: 70
End: 2020-10-14

## 2020-10-14 ENCOUNTER — OFFICE VISIT (OUTPATIENT)
Dept: FAMILY MEDICINE | Facility: CLINIC | Age: 70
End: 2020-10-14
Payer: MEDICARE

## 2020-10-14 VITALS
SYSTOLIC BLOOD PRESSURE: 120 MMHG | OXYGEN SATURATION: 98 % | HEART RATE: 68 BPM | WEIGHT: 120 LBS | RESPIRATION RATE: 18 BRPM | TEMPERATURE: 97.9 F | DIASTOLIC BLOOD PRESSURE: 80 MMHG | BODY MASS INDEX: 26.21 KG/M2

## 2020-10-14 DIAGNOSIS — K52.9 NON-SPECIFIC COLITIS: Primary | ICD-10-CM

## 2020-10-14 DIAGNOSIS — R63.4 WEIGHT LOSS: ICD-10-CM

## 2020-10-14 DIAGNOSIS — I10 HYPERTENSION, UNSPECIFIED TYPE: ICD-10-CM

## 2020-10-14 DIAGNOSIS — M17.0 PRIMARY OSTEOARTHRITIS OF BOTH KNEES: ICD-10-CM

## 2020-10-14 DIAGNOSIS — Z87.440 H/O URINARY TRACT INFECTION: ICD-10-CM

## 2020-10-14 PROCEDURE — 99214 OFFICE O/P EST MOD 30 MIN: CPT | Performed by: FAMILY MEDICINE

## 2020-10-14 RX ORDER — LISINOPRIL 5 MG/1
5 TABLET ORAL DAILY
Qty: 90 TABLET | Refills: 1 | Status: SHIPPED | OUTPATIENT
Start: 2020-10-14 | End: 2021-04-02

## 2020-10-14 NOTE — PROGRESS NOTES
Subjective     Keiko Kimball is a 70 year old female who presents to clinic today for the following health issues:    HPI           Hospital Follow-up Visit:    Hospital/Nursing Home/IP Rehab Facility: [facility]  Date of Admission: 8/12/2020  Date of Discharge: 8/13/2020  Reason(s) for Admission: Colitis, suspect viral, resolved  UTI with hematuria   Leukocytosis      Was your hospitalization related to COVID-19? No   Problems taking medications regularly:  None  Medication changes since discharge: None  Problems adhering to non-medication therapy:  None    Summary of hospitalization:  [facility] discharge summary reviewed  Diagnostic Tests/Treatments reviewed.  Follow up needed: is needing colonoscopy  She does not want to have that done.   She is stressed and has lost some weight. About 4 # since her admit.  No dairrhea, some vomitting after eating, bile, not food, this is not new and is the same for 10 yrs.  Is back on her MVI for her eyes, she stopped it for awhile, to prevent stomach upset. It did not change, so back on the MVI and her antacid.  Her BP was low last time during her B12 shot, not lightheaded, or dizzy  Other Healthcare Providers Involved in Patient s Care:         None  Update since discharge: improved. Post Discharge Medication Reconciliation: discharge medications reconciled, continue medications without change.  Plan of care communicated with patient              Review of Systems   Constitutional, HEENT, cardiovascular, pulmonary, gi and gu systems are negative, except as otherwise noted.      Objective    /80 (BP Location: Left arm, Patient Position: Chair, Cuff Size: Adult Regular)   Pulse 68   Temp 97.9  F (36.6  C) (Oral)   Resp 18   Wt 54.4 kg (120 lb)   SpO2 98%   BMI 26.21 kg/m    Body mass index is 26.21 kg/m .  Physical Exam   GENERAL: healthy, alert and no distress  EYES: Eyes grossly normal to inspection, PERRL and conjunctivae and sclerae  "normal  HENT: ear canals and TM's normal, nose and mouth without ulcers or lesions  NECK: no adenopathy, no asymmetry, masses, or scars and thyroid normal to palpation  RESP: lungs clear to auscultation - no rales, rhonchi or wheezes  CV: regular rate and rhythm, normal S1 S2, no S3 or S4, no murmur, click or rub, no peripheral edema and peripheral pulses strong  ABDOMEN: soft, nontender, no hepatosplenomegaly, no masses and bowel sounds normal  MS: no gross musculoskeletal defects noted, no edema  Kyphosis noted, right arm in sling  SKIN: no suspicious lesions or rashes  NEURO: Normal strength and tone, mentation intact and speech normal  PSYCH: mentation appears normal, affect normal/bright            Assessment & Plan     Non-specific colitis  Resolved, pt refusing colonoscopy at this time, discussed concerns, pt asking what could have caused this, I emphasized the need for colonoscopy to answer that question. May have been viral.  Labs reviewed and back to normal    Weight loss  Pt understands, this is     Hypertension, unspecified type  Low at times, will lower to 5mg daily  - lisinopril (ZESTRIL) 5 MG tablet; Take 1 tablet (5 mg) by mouth daily    Primary osteoarthritis of both knees  Pt asking for scooter, does not get around well, she is fearfull of falling, will order PT/OT haile to see if qualifies  - Electric Wheelchair Order for DME - ONLY FOR DME; Future     BMI:   Estimated body mass index is 26.21 kg/m  as calculated from the following:    Height as of 8/12/20: 1.441 m (4' 8.73\").    Weight as of this encounter: 54.4 kg (120 lb).            Regular exercise    Return in about 6 months (around 4/14/2021) for med check, High blood pressure med check in person.    Brittnee Sharma MD  [facility]  Pre-Visit Planning     Future Appointments   Date Time Provider Department Center   10/14/2020 10:00 AM Brittnee Sharma MD [facility]     Arrival Time for this Appointment:  " 9:40 AM   Appointment Notes for this encounter:   Hospital follow up/update B12    Questionnaires Reviewed/Assigned  No additional questionnaires are needed    Last OV with provider    Hospital ER Visits  08/12/2020 Hosp visit  Hospital Course  Keiko Kimball was admitted on 8/12/2020.  The following problems were addressed during her hospitalization:  #Colitis, suspect viral, resolved: acute onset of diarrhea prior to admission that lasted <24 hours, no episodes while admitted thus no stool studies performed. Likely viral course. CT of abdomen showed evidence of colitis. Recommend outpatient colonoscopy.   #UTI with hematuria: abnormal UA, reported pressure with urinating and new onset hematuria with clots, increased vomiting, and diaphoresis. Urine clearing on own prior to discharge after receiving IV Rocephin, completed 2 doses while admitted. Suspect cause for leukocytosis that was trending down. Low grade fever of 100 during stay. No current urine culture results prior to discharge and patient is not willing to stay to wait for these results, will contact if antibiotic needs to be changed.   - PO Cefdinir 300 mg BID for 5 days  - Follow urine culture results  #Leukocytosis: initial leukocytosis of 20.9 with improvement to 16.5 prior to discharge. Received intraarticular steroid injection on 8/11 at [facility], may be contributing but unlikely main cause, suspect this is related to UTI and/or stress response from diarrhea prior to admission.   - Recheck CBC on 8/17 to monitor for resolution     Specialty Visits  none    Imaging and Lab Review  See epic from hosp stay    Recent Procedures  none    Health Maintenance Due   Topic Date Due     MAMMO SCREENING  11/01/2018     URINE DRUG SCREEN  08/07/2019     DTAP/TDAP/TD IMMUNIZATION (3 - Td) 01/01/2020     Current Outpatient Medications   Medication     lisinopril (ZESTRIL) 5 MG tablet     acetaminophen (TYLENOL) 650 MG CR tablet     cyanocobalamin (VITAMIN B12) 1000  MCG/ML injection     medical cannabis (Patient's own supply)     Multiple Vitamins-Minerals (PRESERVISION AREDS 2) CAPS     omeprazole (PRILOSEC) 40 MG DR capsule     Current Facility-Administered Medications   Medication     vitamin B-12 (CYANOCOBALAMIN) injection 1,000 mcg     Patient is not active on MyChart. Encouraged MyChart activation.    Patient preferred phone number: [phone]  Did not speak to pt pvp same day as appointment  Julissa COSTA, PAL (Patient Advocate Liaison)

## 2020-10-30 DIAGNOSIS — I10 ESSENTIAL HYPERTENSION, BENIGN: ICD-10-CM

## 2020-10-30 RX ORDER — LISINOPRIL 10 MG/1
TABLET ORAL
Qty: 90 TABLET | Refills: 1 | Status: SHIPPED | OUTPATIENT
Start: 2020-10-30 | End: 2021-04-06

## 2020-10-30 NOTE — TELEPHONE ENCOUNTER
Routing refill request to provider for review/approval because:  Labs out of range:  Cr    Heather Rubio, RN   [facility] -- Triage Nurse

## 2020-11-02 ENCOUNTER — ALLIED HEALTH/NURSE VISIT (OUTPATIENT)
Dept: FAMILY MEDICINE | Facility: CLINIC | Age: 70
End: 2020-11-02
Payer: MEDICARE

## 2020-11-02 VITALS — SYSTOLIC BLOOD PRESSURE: 106 MMHG | HEART RATE: 68 BPM | DIASTOLIC BLOOD PRESSURE: 78 MMHG

## 2020-11-02 DIAGNOSIS — Z00.00 ENCOUNTER FOR MEDICARE ANNUAL WELLNESS EXAM: Primary | ICD-10-CM

## 2020-11-02 DIAGNOSIS — Z87.440 H/O URINARY TRACT INFECTION: ICD-10-CM

## 2020-11-02 LAB
ALBUMIN UR-MCNC: NEGATIVE MG/DL
APPEARANCE UR: CLEAR
BILIRUB UR QL STRIP: NEGATIVE
COLOR UR AUTO: YELLOW
GLUCOSE UR STRIP-MCNC: NEGATIVE MG/DL
HGB UR QL STRIP: NEGATIVE
KETONES UR STRIP-MCNC: NEGATIVE MG/DL
LEUKOCYTE ESTERASE UR QL STRIP: NEGATIVE
NITRATE UR QL: NEGATIVE
PH UR STRIP: 7 PH (ref 5–7)
SOURCE: NORMAL
SP GR UR STRIP: 1.02 (ref 1–1.03)
UROBILINOGEN UR STRIP-ACNC: 1 EU/DL (ref 0.2–1)

## 2020-11-02 PROCEDURE — 99207 PR NO CHARGE NURSE ONLY: CPT

## 2020-11-02 PROCEDURE — 81003 URINALYSIS AUTO W/O SCOPE: CPT | Performed by: FAMILY MEDICINE

## 2020-11-02 PROCEDURE — 96372 THER/PROPH/DIAG INJ SC/IM: CPT

## 2020-11-02 RX ADMIN — CYANOCOBALAMIN 1000 MCG: 1000 INJECTION, SOLUTION INTRAMUSCULAR; SUBCUTANEOUS at 14:10

## 2020-11-02 NOTE — PROGRESS NOTES
Clinic Administered Medication Documentation      Injectable Medication Documentation    Patient was given Cyanocobalamin (B-12). Prior to medication administration, verified patients identity using patient s name and date of birth. Please see MAR and medication order for additional information. Patient instructed to remain in clinic for 15 minutes.      Was entire vial of medication used? Yes  Vial/Syringe: Single dose vial  Expiration Date:  08/2021  Was this medication supplied by the patient? No     Keiko Kimball is a 70 year old patient who comes in today for a Blood Pressure check.  Initial BP:  /78 (BP Location: Right arm, Patient Position: Sitting, Cuff Size: Adult Large)   Pulse 68      68  Disposition: follow-up as previously indicated by provider

## 2020-11-03 ENCOUNTER — TELEPHONE (OUTPATIENT)
Dept: FAMILY MEDICINE | Facility: CLINIC | Age: 70
End: 2020-11-03

## 2020-11-03 NOTE — TELEPHONE ENCOUNTER
Patient wondering if lab results from yesterday can be reviewed today (UA) routed to Dr Sharma, please advise    Jason Medellin RN

## 2020-11-12 ENCOUNTER — TRANSFERRED RECORDS (OUTPATIENT)
Dept: HEALTH INFORMATION MANAGEMENT | Facility: CLINIC | Age: 70
End: 2020-11-12

## 2020-12-02 ENCOUNTER — ALLIED HEALTH/NURSE VISIT (OUTPATIENT)
Dept: FAMILY MEDICINE | Facility: CLINIC | Age: 70
End: 2020-12-02
Payer: MEDICARE

## 2020-12-02 DIAGNOSIS — E53.8 VITAMIN B12 DEFICIENCY WITHOUT ANEMIA: Primary | ICD-10-CM

## 2020-12-02 PROCEDURE — 96372 THER/PROPH/DIAG INJ SC/IM: CPT

## 2020-12-02 PROCEDURE — 99207 PR NO CHARGE NURSE ONLY: CPT

## 2020-12-02 RX ADMIN — CYANOCOBALAMIN 1000 MCG: 1000 INJECTION, SOLUTION INTRAMUSCULAR; SUBCUTANEOUS at 14:30

## 2020-12-02 NOTE — PROGRESS NOTES
Clinic Administered Medication Documentation      Injectable Medication Documentation    Patient was given Cyanocobalamin (B-12). Prior to medication administration, verified patients identity using patient s name and date of birth. Please see MAR and medication order for additional information. Patient instructed to report any adverse reaction to staff immediately .      Was entire vial of medication used? Yes  Vial/Syringe: Single dose vial  Expiration Date:  02/2021  Was this medication supplied by the patient? No

## 2020-12-03 ENCOUNTER — TELEPHONE (OUTPATIENT)
Dept: FAMILY MEDICINE | Facility: CLINIC | Age: 70
End: 2020-12-03

## 2021-01-04 ENCOUNTER — ALLIED HEALTH/NURSE VISIT (OUTPATIENT)
Dept: FAMILY MEDICINE | Facility: CLINIC | Age: 71
End: 2021-01-04
Payer: MEDICARE

## 2021-01-04 DIAGNOSIS — E53.8 VITAMIN B12 DEFICIENCY WITHOUT ANEMIA: Primary | ICD-10-CM

## 2021-01-04 PROCEDURE — 99207 PR NO CHARGE NURSE ONLY: CPT

## 2021-01-04 PROCEDURE — 96372 THER/PROPH/DIAG INJ SC/IM: CPT

## 2021-01-04 RX ADMIN — CYANOCOBALAMIN 1000 MCG: 1000 INJECTION, SOLUTION INTRAMUSCULAR; SUBCUTANEOUS at 11:08

## 2021-01-04 NOTE — PROGRESS NOTES
Clinic Administered Medication Documentation      Injectable Medication Documentation    Patient was given Cyanocobalamin (B-12). Prior to medication administration, verified patients identity using patient s name and date of birth. Please see MAR and medication order for additional information. Patient instructed to remain in clinic for 15 minutes.      Was entire vial of medication used? Yes  Vial/Syringe: Single dose vial  Expiration Date:  02/2021  Was this medication supplied by the patient? No   Seema Walker, CMA

## 2021-01-21 ENCOUNTER — TRANSFERRED RECORDS (OUTPATIENT)
Dept: HEALTH INFORMATION MANAGEMENT | Facility: CLINIC | Age: 71
End: 2021-01-21

## 2021-02-03 ENCOUNTER — ALLIED HEALTH/NURSE VISIT (OUTPATIENT)
Dept: FAMILY MEDICINE | Facility: CLINIC | Age: 71
End: 2021-02-03
Payer: MEDICARE

## 2021-02-03 ENCOUNTER — TELEPHONE (OUTPATIENT)
Dept: FAMILY MEDICINE | Facility: CLINIC | Age: 71
End: 2021-02-03

## 2021-02-03 DIAGNOSIS — E53.8 VITAMIN B12 DEFICIENCY WITHOUT ANEMIA: Primary | ICD-10-CM

## 2021-02-03 PROCEDURE — 99207 PR NO CHARGE NURSE ONLY: CPT

## 2021-02-03 PROCEDURE — 96372 THER/PROPH/DIAG INJ SC/IM: CPT

## 2021-02-03 RX ADMIN — CYANOCOBALAMIN 1000 MCG: 1000 INJECTION, SOLUTION INTRAMUSCULAR; SUBCUTANEOUS at 11:42

## 2021-02-03 NOTE — TELEPHONE ENCOUNTER
Called the pt back.    She feels like her colitis is coming back.  She had liquid diarrhea - it started Monday.  It is not bloody.  She took some kaopectate.  Her stomach didn't feel that good today.  She did have a small amount of regular stool today.  No fever.  She is drinking ok.  She is peeing ok.      Advised she should set up some kind of appt.  She just wants medicine called in.      Advised I will forward this to Dr. Sharma and we will let her know if she needs some kind of appt.  She said she would be able to do a phone visit if needed.

## 2021-02-03 NOTE — PROGRESS NOTES
.Clinic Administered Medication Documentation      Injectable Medication Documentation    Patient was given Cyanocobalamin (B-12). Prior to medication administration, verified patients identity using patient s name and date of birth. Please see MAR and medication order for additional information. Patient instructed to stay in clinic after the injection but patient declined.      Was entire vial of medication used? Yes  Vial/Syringe: Syringe  Expiration Date:  2/28/21  Was this medication supplied by the patient? No

## 2021-02-03 NOTE — TELEPHONE ENCOUNTER
Reason for call:  Call back   Patient called regarding (reason for call): call back  Additional comments: Patient would like a call back to discuss colitis symptoms.    Phone number to reach patient:  Home number on file [phone] (home)    Best Time:  Anytime    Can we leave a detailed message on this number?  YES    Travel screening: Not Applicable

## 2021-02-03 NOTE — TELEPHONE ENCOUNTER
Her last colitis was thought to be viral, but she declined the colonoscopy, so we do not have a dx other wise. Viral colitis is treated with fluids and rest. If she is not able to keep hydrated we can arrange for her to be seen at the [facility] for a full eval and even fluids if needed.  Please notify pt, thank you

## 2021-03-02 ENCOUNTER — ALLIED HEALTH/NURSE VISIT (OUTPATIENT)
Dept: FAMILY MEDICINE | Facility: CLINIC | Age: 71
End: 2021-03-02
Payer: MEDICARE

## 2021-03-02 DIAGNOSIS — E53.8 VITAMIN B12 DEFICIENCY WITHOUT ANEMIA: Primary | ICD-10-CM

## 2021-03-02 PROCEDURE — 99207 PR NO CHARGE NURSE ONLY: CPT

## 2021-03-02 PROCEDURE — 96372 THER/PROPH/DIAG INJ SC/IM: CPT

## 2021-03-02 RX ADMIN — CYANOCOBALAMIN 1000 MCG: 1000 INJECTION, SOLUTION INTRAMUSCULAR; SUBCUTANEOUS at 14:00

## 2021-04-01 DIAGNOSIS — I10 HYPERTENSION, UNSPECIFIED TYPE: ICD-10-CM

## 2021-04-02 RX ORDER — LISINOPRIL 5 MG/1
5 TABLET ORAL DAILY
Qty: 90 TABLET | Refills: 1 | Status: SHIPPED | OUTPATIENT
Start: 2021-04-02 | End: 2021-04-06

## 2021-04-02 NOTE — TELEPHONE ENCOUNTER
Routing refill request to provider for review/approval because:  Labs not current:  Cr  Patient has 2 different dosages and orders for this medication. Please review and discontinue medications not current.     Thank you,  Zoë Yates R.N.

## 2021-04-06 ENCOUNTER — OFFICE VISIT (OUTPATIENT)
Dept: FAMILY MEDICINE | Facility: CLINIC | Age: 71
End: 2021-04-06
Payer: MEDICARE

## 2021-04-06 VITALS
BODY MASS INDEX: 27.5 KG/M2 | HEART RATE: 69 BPM | WEIGHT: 125.9 LBS | TEMPERATURE: 98.7 F | RESPIRATION RATE: 16 BRPM | SYSTOLIC BLOOD PRESSURE: 120 MMHG | OXYGEN SATURATION: 98 % | DIASTOLIC BLOOD PRESSURE: 72 MMHG

## 2021-04-06 DIAGNOSIS — E53.8 VITAMIN B12 DEFICIENCY WITHOUT ANEMIA: ICD-10-CM

## 2021-04-06 DIAGNOSIS — M24.411 CHRONIC DISLOCATION OF SHOULDER, RIGHT: ICD-10-CM

## 2021-04-06 DIAGNOSIS — G90.511 COMPLEX REGIONAL PAIN SYNDROME TYPE 1 OF RIGHT UPPER EXTREMITY: ICD-10-CM

## 2021-04-06 DIAGNOSIS — I10 HYPERTENSION, UNSPECIFIED TYPE: Primary | ICD-10-CM

## 2021-04-06 DIAGNOSIS — K27.9 PEPTIC ULCER: ICD-10-CM

## 2021-04-06 DIAGNOSIS — Z13.6 CARDIOVASCULAR SCREENING; LDL GOAL LESS THAN 160: ICD-10-CM

## 2021-04-06 DIAGNOSIS — M17.0 PRIMARY OSTEOARTHRITIS OF BOTH KNEES: ICD-10-CM

## 2021-04-06 PROCEDURE — 96372 THER/PROPH/DIAG INJ SC/IM: CPT | Performed by: FAMILY MEDICINE

## 2021-04-06 PROCEDURE — 99214 OFFICE O/P EST MOD 30 MIN: CPT | Mod: 25 | Performed by: FAMILY MEDICINE

## 2021-04-06 RX ORDER — LISINOPRIL 5 MG/1
5 TABLET ORAL DAILY
Qty: 90 TABLET | Refills: 1 | Status: SHIPPED | OUTPATIENT
Start: 2021-04-06 | End: 2021-10-21

## 2021-04-06 RX ORDER — DICLOFENAC SODIUM 30 MG/G
1 GEL TOPICAL 2 TIMES DAILY
Qty: 100 G | Refills: 11 | Status: SHIPPED | OUTPATIENT
Start: 2021-04-06 | End: 2022-04-05

## 2021-04-06 RX ORDER — OMEPRAZOLE 40 MG/1
40 CAPSULE, DELAYED RELEASE ORAL
Qty: 180 CAPSULE | Refills: 3 | Status: SHIPPED | OUTPATIENT
Start: 2021-04-06 | End: 2021-10-21

## 2021-04-06 RX ADMIN — CYANOCOBALAMIN 1000 MCG: 1000 INJECTION, SOLUTION INTRAMUSCULAR; SUBCUTANEOUS at 10:44

## 2021-04-06 NOTE — PROGRESS NOTES
"    Assessment & Plan     Hypertension, unspecified type  Controlled, cont same low dose  - lisinopril (ZESTRIL) 5 MG tablet; Take 1 tablet (5 mg) by mouth daily  - **Comprehensive metabolic panel FUTURE anytime; Future  - **CBC with platelets FUTURE anytime; Future    Peptic ulcer  Stable, cont med long term,rarely vomiting now and weight is stable  - omeprazole (PRILOSEC) 40 MG DR capsule; Take 1 capsule (40 mg) by mouth 2 times daily Take 30-60 minutes before a meal.    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    Weight as of this encounter: 57.1 kg (125 lb 14.4 oz).   Weight management plan: Discussed healthy diet and exercise guidelines    Work on weight loss  Regular exercise    Return in about 6 months (around 10/6/2021) for Preventive Visit plus med refils and fasting labs.    Brittnee Sharma MD  [facility] SHANNAN Aden is a 70 year old who presents for the following health issues     HPI     Hypertension Follow-up      Do you check your blood pressure regularly outside of the clinic? No     Are you following a low salt diet? No    Are your blood pressures ever more than 140 on the top number (systolic) OR more   than " 90 on the bottom number (diastolic), for example 140/90? ARE NOT BEING CHECKED       How many servings of fruits and vegetables do you eat daily?  0-1    On average, how many sweetened beverages do you drink each day (Examples: soda, juice, sweet tea, etc.  Do NOT count diet or artificially sweetened beverages)?   1    How many days per week do you exercise enough to make your heart beat faster? 3 or less    How many minutes a day do you exercise enough to make your heart beat faster? 30 - 60    How many days per week do you miss taking your medication? 0    Lisinopril 5mg per day      Arthritis pain, voltaren 1% gel OTC is working but needing it often, and would like to try a strong rx, pharmacist told her about the 3% gel.  Did try the medical cannabis, worked ok but was to spendy, and voltaren is working ok, the medical cannabis     prilosec 40mg BID, working ok, bouts of vomitting still happen, but not as often, every few days it can happen, vomits suddenly and then will try to eat and has gained 1/2 #!        Review of Systems   CONSTITUTIONAL: NEGATIVE for fever, chills, change in weight  ENT/MOUTH: NEGATIVE for ear, mouth and throat problems  RESP: NEGATIVE for significant cough or SOB  CV: NEGATIVE for chest pain, palpitations or peripheral edema      Objective    /72   Pulse 69   Temp 98.7  F (37.1  C) (Oral)   Resp 16   Wt 57.1 kg (125 lb 14.4 oz)   SpO2 98%   BMI 27.50 kg/m    Body mass index is 27.5 kg/m .  Physical Exam   GENERAL: healthy, alert and no distress  NECK: no adenopathy, no asymmetry, masses, or scars and thyroid normal to palpation  RESP: lungs clear to auscultation - no rales, rhonchi or wheezes  CV: regular rate and rhythm, normal S1 S2, no S3 or S4, no murmur, click or rub, no peripheral edema and peripheral pulses strong  MS: kyphosis noted, right arm in sling/brace, ataxic gate

## 2021-04-06 NOTE — NURSING NOTE
Clinic Administered Medication Documentation      Injectable Medication Documentation    Patient was given Cyanocobalamin (B-12). Prior to medication administration, verified patients identity using patient s name and date of birth. Please see MAR and medication order for additional information. Patient instructed to report any adverse reaction to staff immediately .      Was entire vial of medication used? Yes  Vial/Syringe: Single dose vial  Expiration Date:  9/2022  Was this medication supplied by the patient? No

## 2021-04-07 PROBLEM — E21.3 HYPERPARATHYROIDISM (H): Status: RESOLVED | Noted: 2017-04-18 | Resolved: 2021-04-07

## 2021-04-08 ENCOUNTER — TELEPHONE (OUTPATIENT)
Dept: FAMILY MEDICINE | Facility: CLINIC | Age: 71
End: 2021-04-08

## 2021-04-08 NOTE — TELEPHONE ENCOUNTER
We can try for an appeal, a letter of medical necessity would need to be written and placed in the patient's chart.

## 2021-04-08 NOTE — TELEPHONE ENCOUNTER
She cannot take any oral NSAIDS due to peptic ulcer. tyelnol along is not helping her severe OA in knees and shoulders.  1% helps, but not enough, so 3% was ordered.  Can we appeal?

## 2021-04-08 NOTE — LETTER
4/15/2021    INSURER: Payor: MEDICARE / Plan: MEDICARE / Product Type: Medicare /   ATTN: ***  Re: Prior Authorization Request  Patient: Keiko Kimball  Policy ID#:  [id number]  : 1950      To Whom it May Concern:    I am writing to formally request a prior authorization of coverage for my patient,  Keiko Kimball, for treatment using Diclofenac Sodium 3 % GEL.  I am requesting authorization for applicable provider professional and facility services associated with this therapy.      I have treated Keiko Kimball since  and I have determined that it is medically appropriate for  this patient to receive be treated with  Diclofenac Sodium 3 % GEL for the reason(s) stated below:     She cannot take any oral NSAIDS due to peptic ulcer. tyelnol alone is not helping her severe OA in knees and shoulders.  1% helps, but not enough, so 3% was ordered..        I firmly believe that this therapy is clinically appropriate and that Keiko Kimball would benefit from improved {clinical outcomes, quality of life} if allowed the opportunity to receive this treatment.  Please contact me at Dept: [phone] if you require additional information to ensure the prompt approval for coverage.    Please send your written decision to me at this address:  [address] [address] [phone]  Dept: [phone]  E-mail: ***      Sincerely,      Brittnee Sharma MD        \

## 2021-04-08 NOTE — TELEPHONE ENCOUNTER
Central Prior Authorization Team   Phone: [phone]      PA Initiation    Medication: diclofenac-Initiated  Insurance Company: [employer] - Phone [phone] Fax [phone]  [id number] [place], [place] - [address] AT [place] [address]  Filling Pharmacy Phone: [phone]  Filling Pharmacy Fax:    Start Date: 4/8/2021

## 2021-04-08 NOTE — TELEPHONE ENCOUNTER
PRIOR AUTHORIZATION DENIED    Medication: diclofenac-DENIED    Denial Date: 4/8/2021    Denial Rational: Drug is not being used for a medically-accepted indication. (I believe the 1% is covered for osteoarthritis)              Appeal Information:

## 2021-04-08 NOTE — TELEPHONE ENCOUNTER
Prior Authorization Retail Medication Request    Medication/Dose: Diclofenac 3% gel 100 gm  ICD code (if different than what is on RX):  Primary osteoarthritis of both knees [M17.0]   Previously Tried and Failed:  Oral medications - see chart  Rationale:  Topicals work well for her    Insurance Name:  Medicare/[facility]    Insurance ID:  [id number]      Pharmacy Information (if different than what is on RX)  Name:  [facility]  Phone:  [phone]  Fax: [phone]

## 2021-04-15 NOTE — TELEPHONE ENCOUNTER
Medication Appeal Initiation    We have initiated an appeal for the requested medication:  Medication: diclofenac-APPEAL INITIATED  Appeal Start Date:  4/15/2021  Insurance Company: [employer] - Phone [phone] Fax [phone]  Comments:       Manually faxed letter of medical necessity to [phone]

## 2021-04-22 NOTE — TELEPHONE ENCOUNTER
Called and spoke with Genesis at [facility] to check on the status of appeal. It is still under review.  Ref# [id number]

## 2021-04-26 NOTE — TELEPHONE ENCOUNTER
MEDICATION APPEAL DENIED    Medication: diclofenac-APPEAL DENIED    Denial Date: 4/26/2021    Denial Rational: Medicare approved compendia do not support the use of the drug for patient's conditions.        Second Level Appeal Information:     Second level appeals will be managed by the clinic staff and provider. Please contact the [employer] Prior Authorization Team if additional information about the denial is needed.

## 2021-05-05 ENCOUNTER — ALLIED HEALTH/NURSE VISIT (OUTPATIENT)
Dept: FAMILY MEDICINE | Facility: CLINIC | Age: 71
End: 2021-05-05
Payer: MEDICARE

## 2021-05-05 DIAGNOSIS — E53.8 VITAMIN B12 DEFICIENCY WITHOUT ANEMIA: Primary | ICD-10-CM

## 2021-05-05 PROCEDURE — 96372 THER/PROPH/DIAG INJ SC/IM: CPT

## 2021-05-05 PROCEDURE — 99207 PR NO CHARGE NURSE ONLY: CPT

## 2021-05-05 RX ADMIN — CYANOCOBALAMIN 1000 MCG: 1000 INJECTION, SOLUTION INTRAMUSCULAR; SUBCUTANEOUS at 13:54

## 2021-05-05 NOTE — PROGRESS NOTES
Clinic Administered Medication Documentation      Injectable Medication Documentation    Patient was given Cyanocobalamin (B-12). Prior to medication administration, verified patients identity using patient s name and date of birth. Please see MAR and medication order for additional information. Patient instructed to remain in clinic for 15 minutes.      Was entire vial of medication used? Yes  Vial/Syringe: Single dose vial  Expiration Date:  09/2021  Was this medication supplied by the patient? No   Seema Walker, CMA

## 2021-06-01 ENCOUNTER — ALLIED HEALTH/NURSE VISIT (OUTPATIENT)
Dept: FAMILY MEDICINE | Facility: CLINIC | Age: 71
End: 2021-06-01
Payer: MEDICARE

## 2021-06-01 DIAGNOSIS — E53.8 VITAMIN B12 DEFICIENCY WITHOUT ANEMIA: Primary | ICD-10-CM

## 2021-06-01 PROCEDURE — 99207 PR NO CHARGE NURSE ONLY: CPT

## 2021-06-01 PROCEDURE — 96372 THER/PROPH/DIAG INJ SC/IM: CPT

## 2021-06-01 RX ADMIN — CYANOCOBALAMIN 1000 MCG: 1000 INJECTION, SOLUTION INTRAMUSCULAR; SUBCUTANEOUS at 13:17

## 2021-06-01 NOTE — PROGRESS NOTES
The following medication was given:     MEDICATION: Vitamin B12  1000 mcg  ROUTE: SQ  SITE: Arm - Right  DOSE: 1 ml   LOT #: [id number]  :  [employer]  EXPIRATION DATE:  09/30/2021  NDC#: 6240-3500-55

## 2021-06-03 DIAGNOSIS — E53.8 VITAMIN B12 DEFICIENCY (NON ANEMIC): ICD-10-CM

## 2021-06-03 DIAGNOSIS — E53.8 VITAMIN B12 DEFICIENCY WITHOUT ANEMIA: ICD-10-CM

## 2021-06-03 NOTE — TELEPHONE ENCOUNTER
Keiko is calling stating that she needs her vitamin B-12 (CYANOCOBALAMIN) injection 1,000 mcg to renewed. Was told at last nurse only one more shot left for July. Please fill.     Keiko also had questions regarding medical marijuana oil, for patients stomach    [phone], ok to leave detailed message.     Taylor Case-

## 2021-06-04 NOTE — TELEPHONE ENCOUNTER
Please pend refills.  Not sure what her question is about? Please triage. Pt has medical cannabis available to her.

## 2021-06-15 RX ORDER — CYANOCOBALAMIN 1000 UG/ML
1000 INJECTION, SOLUTION INTRAMUSCULAR; SUBCUTANEOUS
Status: ACTIVE | OUTPATIENT
Start: 2021-06-27 | End: 2022-06-22

## 2021-06-15 RX ORDER — CYANOCOBALAMIN 1000 UG/ML
1 INJECTION, SOLUTION INTRAMUSCULAR; SUBCUTANEOUS
Qty: 1 ML | Refills: 11 | Status: CANCELLED | OUTPATIENT
Start: 2021-06-15

## 2021-06-15 NOTE — TELEPHONE ENCOUNTER
The form for the medical marijuana has to be filled out again.     She is looking for a renewal for vitamin B-12 injections at the clinic- pended not sure if something else needs to be done.     Mami HWANG RN

## 2021-07-02 ENCOUNTER — ALLIED HEALTH/NURSE VISIT (OUTPATIENT)
Dept: FAMILY MEDICINE | Facility: CLINIC | Age: 71
End: 2021-07-02
Payer: MEDICARE

## 2021-07-02 DIAGNOSIS — E53.8 VITAMIN B12 DEFICIENCY (NON ANEMIC): Primary | ICD-10-CM

## 2021-07-02 PROCEDURE — 99207 PR NO CHARGE NURSE ONLY: CPT

## 2021-07-02 PROCEDURE — 96372 THER/PROPH/DIAG INJ SC/IM: CPT | Performed by: FAMILY MEDICINE

## 2021-07-02 RX ADMIN — CYANOCOBALAMIN 1000 MCG: 1000 INJECTION, SOLUTION INTRAMUSCULAR; SUBCUTANEOUS at 14:15

## 2021-08-03 ENCOUNTER — ALLIED HEALTH/NURSE VISIT (OUTPATIENT)
Dept: FAMILY MEDICINE | Facility: CLINIC | Age: 71
End: 2021-08-03
Payer: MEDICARE

## 2021-08-03 DIAGNOSIS — E53.8 VITAMIN B12 DEFICIENCY (NON ANEMIC): Primary | ICD-10-CM

## 2021-08-03 PROCEDURE — 96372 THER/PROPH/DIAG INJ SC/IM: CPT | Performed by: FAMILY MEDICINE

## 2021-08-03 PROCEDURE — 99207 PR NO CHARGE NURSE ONLY: CPT

## 2021-08-03 RX ADMIN — CYANOCOBALAMIN 1000 MCG: 1000 INJECTION, SOLUTION INTRAMUSCULAR; SUBCUTANEOUS at 13:24

## 2021-08-03 NOTE — PROGRESS NOTES
The following medication was given:     MEDICATION: Vitamin B12  1,000 mcg  ROUTE: SQ  SITE: Deltoid - Right  DOSE: 1,000 mcg  LOT #: [id number]  :  Mike   EXPIRATION DATE:  11-29-22  NDC#: 25418-761-48

## 2021-08-09 ENCOUNTER — TELEPHONE (OUTPATIENT)
Dept: FAMILY MEDICINE | Facility: CLINIC | Age: 71
End: 2021-08-09

## 2021-08-09 DIAGNOSIS — M24.411 CHRONIC DISLOCATION OF SHOULDER, RIGHT: Primary | ICD-10-CM

## 2021-08-09 NOTE — TELEPHONE ENCOUNTER
Patient needs referral to see [facility] on 8/18/21 for right wrist cast. States that she gets a new one every few years and is need of a new one.   -Olivia Hubbard

## 2021-08-18 ENCOUNTER — THERAPY VISIT (OUTPATIENT)
Dept: OCCUPATIONAL THERAPY | Facility: CLINIC | Age: 71
End: 2021-08-18
Payer: MEDICARE

## 2021-08-18 DIAGNOSIS — M24.411 CHRONIC DISLOCATION OF SHOULDER, RIGHT: ICD-10-CM

## 2021-08-18 DIAGNOSIS — M79.601 PAIN OF RIGHT UPPER EXTREMITY: Primary | ICD-10-CM

## 2021-08-18 PROCEDURE — 97165 OT EVAL LOW COMPLEX 30 MIN: CPT | Mod: GO | Performed by: OCCUPATIONAL THERAPIST

## 2021-08-18 PROCEDURE — 97760 ORTHOTIC MGMT&TRAING 1ST ENC: CPT | Mod: GO | Performed by: OCCUPATIONAL THERAPIST

## 2021-08-18 NOTE — LETTER
DEPARTMENT OF HEALTH AND HUMAN SERVICES  CENTERS FOR MEDICARE & MEDICAID SERVICES    PLAN/UPDATED PLAN OF PROGRESS FOR OUTPATIENT REHABILITATION          PATIENTS NAME:  Keiko Kimball   : 1950  PROVIDER NUMBER:    [id number]  [id number]  PR[facility]  MEDICAL RECORD NUMBER: [id number]   START OF CARE DATE:  SOC Date: 21   TYPE:  OT  PRIMARY/TREATMENT DIAGNOSIS: (Pertinent Medical Diagnosis)  Chronic dislocation of shoulder, right  Pain of right upper extremity  VISITS FROM START OF CARE:  Rxs Used: 1     Hand Therapy Initial Evaluation    Current Date:  21     Diagnosis:   R arm pain  DOI:  Long standing  Order: fabrication long arm resting splint   Subjective:  Keiko Kimball is a 68 year old right hand dominant female.     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 Since onset symptoms are unchanged.  Special tests:  x-ray.  Previous treatment: hand therapy and splinting.   General health as reported by patient is fair.  Pertinent medical history includes: osteoporosis, fibromyalgia, chrones.    Medical allergies: See EMR-Codine.  Surgical history:none listed  Medication history: see EMR.     Occupational Profile Information:  Current occupation is retired  Prior functional level:  no limitations  Barriers include:transportation, live alone apartment  Mobility: walking  Transportation: No driving   Leisure activities/hobbies: baking     Upper Extremity Functional Index Score:  SCORE:   Column Totals: /80: 31   (A lower score indicates greater disability.)       PATIENTS NAME:  Keiko Kimball   : 1950    Objective:  Pain:   VAS (0-10) 21   At Rest: 5/10   With Use: 10      Location: right lateral elbow  Description: aching and sharp  Radiates: both proximally and distally  What  Exacerbates Pain?: elbow/arm movement  Worse (D/N):daytime and nighttime  Frequency:daily  Relieves: rest     ROM: not assessed, pt reports her right arm is not functional and has no active motion, pt does have full finger AROM     Strength: contraindicated due to lack of ROM     Assessment/Plan:  Patient presents with symptoms consistent with diagnosis of right arm pain, with surgical  intervention requiring positional splint for pain reduction.      Rehab Potential:  Good - Return to full activity, some limitations     Patient's limitations or Problem List includes:  Pain and Decreased ROM/motion of the right elbow which interferes with the patient's ability to perform Self Care Tasks (dressing, eating), Household Chores and Driving  as compared to previous level of function.     Patient will benefit from skilled Occupational Therapy to decrease pain to return to previous activity level and resume normal daily tasks and to reach their rehab potential.     Barriers to Learning:  No barrier     Communication Issues:  Patient appears to be able to clearly communicate and understand verbal and written communication and follow directions correctly.     Assessment of Occupational Performance:  5 or more Performance Deficits  Identified Performance Deficits: bathing/showering, toileting, dressing, feeding, functional mobility, hygiene and grooming, home establishment and management, meal preparation and cleanup, shopping, sleep and leisure activities    PATIENTS NAME:  Keiko Kimball   : 1950     Clinical Decision Making (Complexity): Low complexity     Treatment Explanation:  The following has been discussed with the patient:  RX ordered/plan of care  Anticipated outcomes  Possible risks and side effects        P: Frequency:  1 x visit  Duration:  NA; 1 x visit, Pt to continue HEP individually.  Pt to return for splint adjustments.  If not return, D/C [facility]     Treatment Plan and HEP:  Orthotic Fabrication:  Long  "Arm dorsal resting orthosis     Discharge Plan:  Achieve all LTG.  Independent in home treatment program.  Reach maximal therapeutic benefit.     Please refer to the daily flowsheet for treatment today and total treatment time.               Caregiver Signature/Credentials _____________________________ Date ________         Akua Long EdD  I have reviewed and certified the need for these services and plan of treatment while under my care.        PHYSICIAN'S SIGNATURE:   _____________________________________  Date___________   Brittnee Sharma MD    Certification period:  Beginning of Cert date period: 08/18/21 to  End of Cert period date: 11/18/21     Functional Level Progress Report: Please see attached \"Goal Flow sheet for Functional level.\"    ____X____ Continue Services or       ________ DC Services                Service dates: From  SOC Date: 08/18/21 date to present                         "

## 2021-08-18 NOTE — PROGRESS NOTES
Hand Therapy Initial Evaluation    Current Date:  8/18/21     Diagnosis:   R arm pain  DOI:  Long standing  Order: fabrication long arm resting splint   Subjective:  Keiko Kimball is a 68 year old right hand dominant female.     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 Since onset symptoms are unchanged.  Special tests:  x-ray.  Previous treatment: hand therapy and splinting.   General health as reported by patient is fair.  Pertinent medical history includes: osteoporosis, fibromyalgia, chrones.    Medical allergies: See EMR-Codine.  Surgical history:none listed  Medication history: see EMR.     Occupational Profile Information:  Current occupation is retired  Prior functional level:  no limitations  Barriers include:transportation, live alone apartment  Mobility: walking  Transportation: No driving   Leisure activities/hobbies: baking     Upper Extremity Functional Index Score:  SCORE:   Column Totals: /80: 31   (A lower score indicates greater disability.)     Objective:  Pain:   VAS (0-10) 8/16/21   At Rest: 5/10   With Use: 8/10      Location: right lateral elbow  Description: aching and sharp  Radiates: both proximally and distally  What Exacerbates Pain?: elbow/arm movement  Worse (D/N):daytime and nighttime  Frequency:daily  Relieves: rest     ROM: not assessed, pt reports her right arm is not functional and has no active motion, pt does have full finger AROM     Strength: contraindicated due to lack of ROM     Assessment/Plan:  Patient presents with symptoms consistent with diagnosis of right arm pain, with surgical  intervention requiring positional splint for pain reduction.      Rehab Potential:  Good - Return to full activity, some limitations     Patient's limitations or Problem List includes:  Pain and Decreased ROM/motion of the right elbow which interferes  with the patient's ability to perform Self Care Tasks (dressing, eating), Household Chores and Driving  as compared to previous level of function.     Patient will benefit from skilled Occupational Therapy to decrease pain to return to previous activity level and resume normal daily tasks and to reach their rehab potential.     Barriers to Learning:  No barrier     Communication Issues:  Patient appears to be able to clearly communicate and understand verbal and written communication and follow directions correctly.     Assessment of Occupational Performance:  5 or more Performance Deficits  Identified Performance Deficits: bathing/showering, toileting, dressing, feeding, functional mobility, hygiene and grooming, home establishment and management, meal preparation and cleanup, shopping, sleep and leisure activities       Clinical Decision Making (Complexity): Low complexity     Treatment Explanation:  The following has been discussed with the patient:  RX ordered/plan of care  Anticipated outcomes  Possible risks and side effects        P: Frequency:  1 x visit  Duration:  NA; 1 x visit, Pt to continue HEP individually.  Pt to return for splint adjustments.  If not return, D/C [facility]     Treatment Plan and HEP:  Orthotic Fabrication:  Long Arm dorsal resting orthosis     Discharge Plan:  Achieve all LTG.  Independent in home treatment program.  Reach maximal therapeutic benefit.     Please refer to the daily flowsheet for treatment today and total treatment time.

## 2021-08-19 ENCOUNTER — TRANSFERRED RECORDS (OUTPATIENT)
Dept: HEALTH INFORMATION MANAGEMENT | Facility: CLINIC | Age: 71
End: 2021-08-19

## 2021-08-27 ENCOUNTER — TELEPHONE (OUTPATIENT)
Dept: FAMILY MEDICINE | Facility: CLINIC | Age: 71
End: 2021-08-27

## 2021-08-27 NOTE — TELEPHONE ENCOUNTER
Received orders, placed in Dr. Sharma in basket.    Please review, sign and fax back to [phone].

## 2021-09-01 ENCOUNTER — ALLIED HEALTH/NURSE VISIT (OUTPATIENT)
Dept: FAMILY MEDICINE | Facility: CLINIC | Age: 71
End: 2021-09-01
Payer: MEDICARE

## 2021-09-01 DIAGNOSIS — E53.8 VITAMIN B12 DEFICIENCY WITHOUT ANEMIA: Primary | ICD-10-CM

## 2021-09-01 PROCEDURE — 96372 THER/PROPH/DIAG INJ SC/IM: CPT | Performed by: FAMILY MEDICINE

## 2021-09-01 PROCEDURE — 99207 PR NO CHARGE NURSE ONLY: CPT

## 2021-09-01 RX ADMIN — CYANOCOBALAMIN 1000 MCG: 1000 INJECTION, SOLUTION INTRAMUSCULAR; SUBCUTANEOUS at 13:50

## 2021-09-07 ENCOUNTER — TELEPHONE (OUTPATIENT)
Dept: FAMILY MEDICINE | Facility: CLINIC | Age: 71
End: 2021-09-07

## 2021-09-07 DIAGNOSIS — M24.411 CHRONIC DISLOCATION OF SHOULDER, RIGHT: Primary | ICD-10-CM

## 2021-09-07 DIAGNOSIS — M17.0 PRIMARY OSTEOARTHRITIS OF BOTH KNEES: ICD-10-CM

## 2021-09-07 NOTE — TELEPHONE ENCOUNTER
Keiko is calling needing an order for a lift chair. States that Dr. Sharma did an order for a lift chair approx nine years ago. Her current lift has broken and is non functional. Keiko would like rx faxed to herself at [phone].    Keiko, [phone], ok to leave detailed message.     Taylor Case-

## 2021-09-13 ENCOUNTER — TELEPHONE (OUTPATIENT)
Dept: FAMILY MEDICINE | Facility: CLINIC | Age: 71
End: 2021-09-13

## 2021-09-13 NOTE — TELEPHONE ENCOUNTER
Rec'd Certificate of Medical Necessity from [facility]. Placed in PCP basket for review and signature. Fax: [phone]    Taylor Case-

## 2021-09-20 ENCOUNTER — TELEPHONE (OUTPATIENT)
Dept: FAMILY MEDICINE | Facility: CLINIC | Age: 71
End: 2021-09-20

## 2021-09-20 NOTE — TELEPHONE ENCOUNTER
Reason for Call:  Other appointment    Detailed comments: needs a face to face to do a wheel chair assessment and wants to come in earlier than 10/05/21    Phone Number Patient can be reached at: Home number on file [phone] (home)    Best Time: any    Can we leave a detailed message on this number? YES    Call taken on 9/20/2021 at 4:21 PM by Shiresa H. Ormond

## 2021-09-21 NOTE — TELEPHONE ENCOUNTER
Addended the note in April, we can use that note or we can wait until 2 weeks at her next appt, please

## 2021-09-21 NOTE — TELEPHONE ENCOUNTER
Talked to Dr. Sharma.  She wants to know more about what is needed.    Called the pt back.      The chair lift comes from [facility] is where she would get the lift chair.  She said Dr. Sharma ordered a lift chair for her 9 years ago.      See telephone call of 9/7/21.  See also telephone call of 9/13/21.      Now they need a face to face visit with notes saying the pt needs a lift chair - have in chart notes that she needs a lift chair.  She can't get out of chair easily with 1 arm.  She had one and it busted.

## 2021-09-24 ENCOUNTER — TELEPHONE (OUTPATIENT)
Dept: FAMILY MEDICINE | Facility: CLINIC | Age: 71
End: 2021-09-24

## 2021-09-24 NOTE — TELEPHONE ENCOUNTER
Received orders, placed in Dr. Sharma in basket.    Please review, sign and fax back to [phone].

## 2021-09-28 ENCOUNTER — MEDICAL CORRESPONDENCE (OUTPATIENT)
Dept: HEALTH INFORMATION MANAGEMENT | Facility: CLINIC | Age: 71
End: 2021-09-28

## 2021-09-28 ENCOUNTER — LAB (OUTPATIENT)
Dept: LAB | Facility: CLINIC | Age: 71
End: 2021-09-28
Payer: MEDICARE

## 2021-09-28 DIAGNOSIS — I10 HYPERTENSION, UNSPECIFIED TYPE: ICD-10-CM

## 2021-09-28 DIAGNOSIS — Z13.6 CARDIOVASCULAR SCREENING; LDL GOAL LESS THAN 160: ICD-10-CM

## 2021-09-28 DIAGNOSIS — E53.8 VITAMIN B12 DEFICIENCY WITHOUT ANEMIA: ICD-10-CM

## 2021-09-28 LAB
ERYTHROCYTE [DISTWIDTH] IN BLOOD BY AUTOMATED COUNT: 14.1 % (ref 10–15)
HCT VFR BLD AUTO: 38.3 % (ref 35–47)
HGB BLD-MCNC: 12.7 G/DL (ref 11.7–15.7)
MCH RBC QN AUTO: 30.8 PG (ref 26.5–33)
MCHC RBC AUTO-ENTMCNC: 33.2 G/DL (ref 31.5–36.5)
MCV RBC AUTO: 93 FL (ref 78–100)
PLATELET # BLD AUTO: 382 10E3/UL (ref 150–450)
RBC # BLD AUTO: 4.12 10E6/UL (ref 3.8–5.2)
VIT B12 SERPL-MCNC: 526 PG/ML (ref 193–986)
WBC # BLD AUTO: 5.9 10E3/UL (ref 4–11)

## 2021-09-28 PROCEDURE — 80053 COMPREHEN METABOLIC PANEL: CPT

## 2021-09-28 PROCEDURE — 80061 LIPID PANEL: CPT

## 2021-09-28 PROCEDURE — 82607 VITAMIN B-12: CPT

## 2021-09-28 PROCEDURE — 85027 COMPLETE CBC AUTOMATED: CPT

## 2021-09-28 PROCEDURE — 36415 COLL VENOUS BLD VENIPUNCTURE: CPT

## 2021-09-29 LAB
ALBUMIN SERPL-MCNC: 3.6 G/DL (ref 3.4–5)
ALP SERPL-CCNC: 111 U/L (ref 40–150)
ALT SERPL W P-5'-P-CCNC: 19 U/L (ref 0–50)
ANION GAP SERPL CALCULATED.3IONS-SCNC: 5 MMOL/L (ref 3–14)
AST SERPL W P-5'-P-CCNC: 21 U/L (ref 0–45)
BILIRUB SERPL-MCNC: 0.7 MG/DL (ref 0.2–1.3)
BUN SERPL-MCNC: 9 MG/DL (ref 7–30)
CALCIUM SERPL-MCNC: 9.3 MG/DL (ref 8.5–10.1)
CHLORIDE BLD-SCNC: 105 MMOL/L (ref 94–109)
CHOLEST SERPL-MCNC: 210 MG/DL
CO2 SERPL-SCNC: 28 MMOL/L (ref 20–32)
CREAT SERPL-MCNC: 0.65 MG/DL (ref 0.52–1.04)
FASTING STATUS PATIENT QL REPORTED: ABNORMAL
GFR SERPL CREATININE-BSD FRML MDRD: 89 ML/MIN/1.73M2
GLUCOSE BLD-MCNC: 91 MG/DL (ref 70–99)
HDLC SERPL-MCNC: 65 MG/DL
LDLC SERPL CALC-MCNC: 124 MG/DL
NONHDLC SERPL-MCNC: 145 MG/DL
POTASSIUM BLD-SCNC: 4.5 MMOL/L (ref 3.4–5.3)
PROT SERPL-MCNC: 6.8 G/DL (ref 6.8–8.8)
SODIUM SERPL-SCNC: 138 MMOL/L (ref 133–144)
TRIGL SERPL-MCNC: 104 MG/DL

## 2021-10-01 ENCOUNTER — ALLIED HEALTH/NURSE VISIT (OUTPATIENT)
Dept: FAMILY MEDICINE | Facility: CLINIC | Age: 71
End: 2021-10-01
Payer: MEDICARE

## 2021-10-01 DIAGNOSIS — E53.8 VITAMIN B12 DEFICIENCY (NON ANEMIC): Primary | ICD-10-CM

## 2021-10-01 PROCEDURE — 99207 PR NO CHARGE NURSE ONLY: CPT

## 2021-10-01 PROCEDURE — 96372 THER/PROPH/DIAG INJ SC/IM: CPT | Performed by: FAMILY MEDICINE

## 2021-10-01 RX ADMIN — CYANOCOBALAMIN 1000 MCG: 1000 INJECTION, SOLUTION INTRAMUSCULAR; SUBCUTANEOUS at 14:17

## 2021-10-01 NOTE — PROGRESS NOTES
Clinic Administered Medication Documentation    Administrations This Visit     cyanocobalamin injection 1,000 mcg     Admin Date  10/01/2021 Action  Given Dose  1,000 mcg Route  Intramuscular Site  Left Deltoid Administered By  Izzy Vines MA    Ordering Provider: Brittnee Sharma MD    Patient Supplied?: No                  Injectable Medication Documentation    Patient was given Cyanocobalamin (B-12). Prior to medication administration, verified patients identity using patient s name and date of birth. Please see MAR and medication order for additional information. Patient instructed to remain in clinic for 15 minutes and report any adverse reaction to staff immediately .      Was entire vial of medication used? Yes  Vial/Syringe: Single dose vial  Expiration Date:  March 2023  Was this medication supplied by the patient? No     Izzy Vines MA on 10/1/2021 at 2:25 PM

## 2021-10-21 ENCOUNTER — OFFICE VISIT (OUTPATIENT)
Dept: FAMILY MEDICINE | Facility: CLINIC | Age: 71
End: 2021-10-21
Payer: MEDICARE

## 2021-10-21 VITALS
TEMPERATURE: 98.7 F | RESPIRATION RATE: 16 BRPM | BODY MASS INDEX: 27.96 KG/M2 | SYSTOLIC BLOOD PRESSURE: 100 MMHG | WEIGHT: 124.3 LBS | OXYGEN SATURATION: 100 % | HEART RATE: 79 BPM | DIASTOLIC BLOOD PRESSURE: 70 MMHG | HEIGHT: 56 IN

## 2021-10-21 DIAGNOSIS — J30.1 SEASONAL ALLERGIC RHINITIS DUE TO POLLEN: ICD-10-CM

## 2021-10-21 DIAGNOSIS — I10 HYPERTENSION, UNSPECIFIED TYPE: Primary | ICD-10-CM

## 2021-10-21 DIAGNOSIS — E53.8 VITAMIN B12 DEFICIENCY (NON ANEMIC): ICD-10-CM

## 2021-10-21 DIAGNOSIS — K27.9 PEPTIC ULCER: ICD-10-CM

## 2021-10-21 DIAGNOSIS — Z12.11 SPECIAL SCREENING FOR MALIGNANT NEOPLASMS, COLON: ICD-10-CM

## 2021-10-21 PROCEDURE — 99214 OFFICE O/P EST MOD 30 MIN: CPT | Performed by: FAMILY MEDICINE

## 2021-10-21 RX ORDER — LISINOPRIL 5 MG/1
5 TABLET ORAL DAILY
Qty: 90 TABLET | Refills: 1 | Status: SHIPPED | OUTPATIENT
Start: 2021-10-21 | End: 2022-03-01

## 2021-10-21 RX ORDER — IPRATROPIUM BROMIDE 21 UG/1
2 SPRAY, METERED NASAL EVERY 12 HOURS
Qty: 30 ML | Refills: 1 | Status: SHIPPED | OUTPATIENT
Start: 2021-10-21 | End: 2021-12-03

## 2021-10-21 RX ORDER — OMEPRAZOLE 40 MG/1
40 CAPSULE, DELAYED RELEASE ORAL
Qty: 180 CAPSULE | Refills: 3 | Status: SHIPPED | OUTPATIENT
Start: 2021-10-21 | End: 2022-10-31

## 2021-10-21 RX ORDER — AZELASTINE HYDROCHLORIDE 0.5 MG/ML
1 SOLUTION/ DROPS OPHTHALMIC 2 TIMES DAILY
Qty: 6 ML | Refills: 1 | Status: SHIPPED | OUTPATIENT
Start: 2021-10-21 | End: 2022-04-05

## 2021-10-21 RX ORDER — CYANOCOBALAMIN 1000 UG/ML
1 INJECTION, SOLUTION INTRAMUSCULAR; SUBCUTANEOUS
Qty: 1 ML | Refills: 11 | Status: CANCELLED | OUTPATIENT
Start: 2021-10-21

## 2021-10-21 ASSESSMENT — MIFFLIN-ST. JEOR: SCORE: 931.82

## 2021-10-21 ASSESSMENT — PAIN SCALES - GENERAL: PAINLEVEL: NO PAIN (0)

## 2021-10-21 NOTE — LETTER
January 20, 2022      Keiko L Jigar  [address] [facility]   [place] [id number]        Dear ,    We are writing to inform you of your test results.    Your stool card was Normal! Thank you for getting this done.   We can plan on doing this yearly.       Resulted Orders   Fecal colorectal cancer screen (FIT)   Result Value Ref Range    Occult Blood Screen FIT Negative Negative       If you have any questions or concerns, please call the clinic at the number listed above.       Sincerely,            Brittnee Sharma MD

## 2021-10-21 NOTE — PROGRESS NOTES
"  Assessment & Plan     Hypertension, unspecified type  Controlled, cont same  - lisinopril (ZESTRIL) 5 MG tablet; Take 1 tablet (5 mg) by mouth daily    Peptic ulcer  Working well, maintaining weight  - omeprazole (PRILOSEC) 40 MG DR capsule; Take 1 capsule (40 mg) by mouth 2 times daily Take 30-60 minutes before a meal.    Seasonal allergic rhinitis due to pollen  Refilled, controlled  - ipratropium (ATROVENT) 0.03 % nasal spray; Spray 2 sprays into both nostrils every 12 hours  - azelastine (OPTIVAR) 0.05 % ophthalmic solution; Place 1 drop into both eyes 2 times daily    Vitamin B12 deficiency (non anemic)  Taking b12 shots monthly    Special screening for malignant neoplasms, colon  agrees  - Fecal colorectal cancer screen (FIT); Future  - Fecal colorectal cancer screen (FIT)    Prescription drug management         BMI:   Estimated body mass index is 27.87 kg/m  as calculated from the following:    Height as of this encounter: 1.422 m (4' 8\").    Weight as of this encounter: 56.4 kg (124 lb 4.8 oz).   Weight management plan: Discussed healthy diet and exercise guidelines    Work on weight loss  Regular exercise    Return in about 6 months (around 4/21/2022) for Preventive Visit.    Brittnee Sharma MD  [facility] SHANNAN Aden is a 71 year old who presents for the following health issues     HPI     Hypertension Follow-up      Do you check your blood pressure regularly outside of the clinic? No     Are you following a low salt diet? Yes    Are your blood pressures ever more than 140 on the top number (systolic) OR more   than 90 on the bottom number (diastolic), for example 140/90? No      How many servings of fruits and vegetables do you eat daily?  0-1    On average, how many sweetened beverages do you drink each day (Examples: soda, juice, sweet tea, etc.  Do NOT count diet or artificially sweetened beverages)?   1    How many days per week do you exercise enough to make " "your heart beat faster? 2    How many minutes a day do you exercise enough to make your heart beat faster? 30 - 60    How many days per week do you miss taking your medication? 0    Medication Followup of omeprazole and b-12    Taking Medication as prescribed: yes    Side Effects:  None    Medication Helping Symptoms:  yes     Using the lift chair to get out of a soft chair at home, can only use the left arm and to much pressure on that rotator cuff, cannot use the right one.   Chronic pain and dislocation, of the right arm. No longer using cannibis anymore, to costly. takng tylenol for pain, voltaren gel on her knees    Lives in a senior living and some have not been vacinated, stays away from negative folks and those that are not vaccinated.    Is out and about often and has had all 3 covid vaccines    Allergies been worse lately    cholesterol went up a bit, was drinking milk and keeping weight on, the HDL is a bit higher, but so is the LDL    Takes the scooter to the store now, [employer]    Review of Systems   CONSTITUTIONAL: NEGATIVE for fever, chills, change in weight  ENT/MOUTH: NEGATIVE for ear, mouth and throat problems  RESP: NEGATIVE for significant cough or SOB  CV: NEGATIVE for chest pain, palpitations or peripheral edema      Objective    /70 (BP Location: Left arm, Patient Position: Sitting, Cuff Size: Adult Regular)   Pulse 79   Temp 98.7  F (37.1  C) (Oral)   Resp 16   Ht 1.422 m (4' 8\")   Wt 56.4 kg (124 lb 4.8 oz)   SpO2 100%   BMI 27.87 kg/m    Body mass index is 27.87 kg/m .  Physical Exam   GENERAL: healthy, alert and no distress  EYES: Eyes grossly normal to inspection, PERRL and conjunctivae and sclerae normal  HENT: ear canals and TM's normal, nose and mouth without ulcers or lesions  NECK: no adenopathy, no asymmetry, masses, or scars and thyroid normal to palpation  RESP: lungs clear to auscultation - no rales, rhonchi or wheezes  CV: regular rate and rhythm, normal S1 " S2, no S3 or S4, no murmur, click or rub, no peripheral edema and peripheral pulses strong  ABDOMEN: soft, nontender, no hepatosplenomegaly, no masses and bowel sounds normal  MS: right arm in sling  Severe kyphosis  SKIN: no suspicious lesions or rashes  PSYCH: mentation appears normal, affect normal/bright

## 2021-11-09 ENCOUNTER — ALLIED HEALTH/NURSE VISIT (OUTPATIENT)
Dept: FAMILY MEDICINE | Facility: CLINIC | Age: 71
End: 2021-11-09
Payer: MEDICARE

## 2021-11-09 DIAGNOSIS — E53.8 VITAMIN B12 DEFICIENCY (NON ANEMIC): Primary | ICD-10-CM

## 2021-11-09 PROCEDURE — 96372 THER/PROPH/DIAG INJ SC/IM: CPT | Performed by: FAMILY MEDICINE

## 2021-11-09 PROCEDURE — 99207 PR NO CHARGE NURSE ONLY: CPT

## 2021-11-09 RX ADMIN — CYANOCOBALAMIN 1000 MCG: 1000 INJECTION, SOLUTION INTRAMUSCULAR; SUBCUTANEOUS at 13:07

## 2021-11-09 NOTE — PROGRESS NOTES
The following medication was given:     MEDICATION: Vitamin B12  1,000mcg  ROUTE: SQ  SITE: Right arm   DOSE: 1,000mcp   LOT #:   :  [employer]   EXPIRATION DATE:  03/31/2023  NDC#: 37344-802-51

## 2021-11-15 ENCOUNTER — TELEPHONE (OUTPATIENT)
Dept: FAMILY MEDICINE | Facility: CLINIC | Age: 71
End: 2021-11-15
Payer: MEDICARE

## 2021-11-15 DIAGNOSIS — E53.8 VITAMIN B12 DEFICIENCY (NON ANEMIC): Primary | ICD-10-CM

## 2021-11-15 NOTE — TELEPHONE ENCOUNTER
Patient called to schedule a B12 injection, previous future order has since . Looking for new order, routing to provider.     JALEN LopesN, RN  [facility]

## 2021-11-16 RX ORDER — CYANOCOBALAMIN 1000 UG/ML
1000 INJECTION, SOLUTION INTRAMUSCULAR; SUBCUTANEOUS
Status: DISCONTINUED | OUTPATIENT
Start: 2021-11-16 | End: 2024-04-03

## 2021-12-02 DIAGNOSIS — J30.1 SEASONAL ALLERGIC RHINITIS DUE TO POLLEN: ICD-10-CM

## 2021-12-03 RX ORDER — IPRATROPIUM BROMIDE 21 UG/1
SPRAY, METERED NASAL
Qty: 60 ML | Refills: 0 | Status: SHIPPED | OUTPATIENT
Start: 2021-12-03 | End: 2022-04-05

## 2021-12-07 ENCOUNTER — ALLIED HEALTH/NURSE VISIT (OUTPATIENT)
Dept: FAMILY MEDICINE | Facility: CLINIC | Age: 71
End: 2021-12-07
Payer: MEDICARE

## 2021-12-07 DIAGNOSIS — E53.8 VITAMIN B12 DEFICIENCY (NON ANEMIC): Primary | ICD-10-CM

## 2021-12-07 PROCEDURE — 99207 PR NO CHARGE NURSE ONLY: CPT

## 2021-12-07 PROCEDURE — 96372 THER/PROPH/DIAG INJ SC/IM: CPT | Performed by: FAMILY MEDICINE

## 2021-12-07 RX ADMIN — CYANOCOBALAMIN 1000 MCG: 1000 INJECTION, SOLUTION INTRAMUSCULAR; SUBCUTANEOUS at 14:00

## 2021-12-07 NOTE — PROGRESS NOTES
The following medication was given:     MEDICATION: Vitamin B12  1000mcg  ROUTE: IM  SITE: Deltoid - Right  DOSE: 1ml  LOT #: [id number]  :  Mike patricia  EXPIRATION DATE:  11/30/2022   NDC#: 66974-364-53

## 2021-12-23 ENCOUNTER — TELEPHONE (OUTPATIENT)
Dept: FAMILY MEDICINE | Facility: CLINIC | Age: 71
End: 2021-12-23
Payer: MEDICARE

## 2021-12-23 NOTE — TELEPHONE ENCOUNTER
Patient needs renewal of her disability parking certificate by Feb 2022. Please mail form to patient when complete.  -Olivia Hubbard

## 2022-01-04 ENCOUNTER — ALLIED HEALTH/NURSE VISIT (OUTPATIENT)
Dept: FAMILY MEDICINE | Facility: CLINIC | Age: 72
End: 2022-01-04
Payer: MEDICARE

## 2022-01-04 DIAGNOSIS — E53.8 VITAMIN B12 DEFICIENCY WITHOUT ANEMIA: Primary | ICD-10-CM

## 2022-01-04 PROCEDURE — 99207 PR NO CHARGE NURSE ONLY: CPT

## 2022-01-04 PROCEDURE — 96372 THER/PROPH/DIAG INJ SC/IM: CPT | Performed by: FAMILY MEDICINE

## 2022-01-04 RX ADMIN — CYANOCOBALAMIN 1000 MCG: 1000 INJECTION, SOLUTION INTRAMUSCULAR; SUBCUTANEOUS at 13:37

## 2022-01-05 NOTE — TELEPHONE ENCOUNTER
Unable to locate original. I filled out new form and brought back to Dr. Sharma to complete bottom half. I will call patient when form is done to . Patient has been notified that this is in the process and that we will call her when it is ready to be picked up.    Lisa Lam  Patient Access Representative

## 2022-01-13 PROCEDURE — 82274 ASSAY TEST FOR BLOOD FECAL: CPT | Performed by: FAMILY MEDICINE

## 2022-02-01 ENCOUNTER — ALLIED HEALTH/NURSE VISIT (OUTPATIENT)
Dept: FAMILY MEDICINE | Facility: CLINIC | Age: 72
End: 2022-02-01
Payer: MEDICARE

## 2022-02-01 DIAGNOSIS — E53.8 VITAMIN B12 DEFICIENCY (NON ANEMIC): Primary | ICD-10-CM

## 2022-02-01 PROCEDURE — 96372 THER/PROPH/DIAG INJ SC/IM: CPT | Performed by: FAMILY MEDICINE

## 2022-02-01 PROCEDURE — 99207 PR NO CHARGE NURSE ONLY: CPT

## 2022-02-01 RX ADMIN — CYANOCOBALAMIN 1000 MCG: 1000 INJECTION, SOLUTION INTRAMUSCULAR; SUBCUTANEOUS at 13:48

## 2022-02-01 NOTE — PROGRESS NOTES
Clinic Administered Medication Documentation    Administrations This Visit     cyanocobalamin injection 1,000 mcg     Admin Date  02/01/2022 Action  Given Dose  1,000 mcg Route  Intramuscular Site  Right Deltoid Administered By  Seema Walker CMA    Ordering Provider: Brittnee Sharma MD    Patient Supplied?: No                  Injectable Medication Documentation    Patient was given Cyanocobalamin (B-12). Prior to medication administration, verified patients identity using patient s name and date of birth. Please see MAR and medication order for additional information. Patient instructed to remain in clinic for 15 minutes.      Was entire vial of medication used? Yes  Vial/Syringe: Single dose vial  Expiration Date:  11/2022  Was this medication supplied by the patient? No   Seema Walker CMA

## 2022-02-27 DIAGNOSIS — I10 HYPERTENSION, UNSPECIFIED TYPE: ICD-10-CM

## 2022-03-01 ENCOUNTER — ALLIED HEALTH/NURSE VISIT (OUTPATIENT)
Dept: FAMILY MEDICINE | Facility: CLINIC | Age: 72
End: 2022-03-01
Payer: MEDICARE

## 2022-03-01 DIAGNOSIS — E53.8 VITAMIN B12 DEFICIENCY (NON ANEMIC): Primary | ICD-10-CM

## 2022-03-01 PROCEDURE — 96372 THER/PROPH/DIAG INJ SC/IM: CPT | Performed by: FAMILY MEDICINE

## 2022-03-01 PROCEDURE — 99207 PR NO CHARGE NURSE ONLY: CPT

## 2022-03-01 RX ORDER — LISINOPRIL 5 MG/1
TABLET ORAL
Qty: 90 TABLET | Refills: 0 | Status: SHIPPED | OUTPATIENT
Start: 2022-03-01 | End: 2022-04-05

## 2022-03-01 RX ADMIN — CYANOCOBALAMIN 1000 MCG: 1000 INJECTION, SOLUTION INTRAMUSCULAR; SUBCUTANEOUS at 13:40

## 2022-03-01 NOTE — PROGRESS NOTES
The following medication was given:     MEDICATION: Vitamin B12  1000mcg  ROUTE: IM  SITE: Deltoid - Right  DOSE: 1000mcg  LOT #:   :  [employer]  EXPIRATION DATE:  11/30/2022  NDC#: 83424-510-24

## 2022-03-01 NOTE — TELEPHONE ENCOUNTER
Prescription approved per [facility] Refill Protocol.  Theodore TINAJERO RN

## 2022-04-05 ENCOUNTER — OFFICE VISIT (OUTPATIENT)
Dept: FAMILY MEDICINE | Facility: CLINIC | Age: 72
End: 2022-04-05
Payer: MEDICARE

## 2022-04-05 VITALS
TEMPERATURE: 97.9 F | DIASTOLIC BLOOD PRESSURE: 68 MMHG | WEIGHT: 130 LBS | HEART RATE: 65 BPM | OXYGEN SATURATION: 100 % | RESPIRATION RATE: 16 BRPM | SYSTOLIC BLOOD PRESSURE: 92 MMHG | BODY MASS INDEX: 29.15 KG/M2

## 2022-04-05 DIAGNOSIS — G47.00 INSOMNIA, UNSPECIFIED TYPE: ICD-10-CM

## 2022-04-05 DIAGNOSIS — F43.9 STRESS: ICD-10-CM

## 2022-04-05 DIAGNOSIS — G90.511 COMPLEX REGIONAL PAIN SYNDROME TYPE 1 OF RIGHT UPPER EXTREMITY: ICD-10-CM

## 2022-04-05 DIAGNOSIS — I10 HYPERTENSION, UNSPECIFIED TYPE: ICD-10-CM

## 2022-04-05 DIAGNOSIS — Z00.00 MEDICARE ANNUAL WELLNESS VISIT, SUBSEQUENT: Primary | ICD-10-CM

## 2022-04-05 DIAGNOSIS — E53.8 VITAMIN B12 DEFICIENCY (NON ANEMIC): ICD-10-CM

## 2022-04-05 DIAGNOSIS — E53.8 VITAMIN B12 DEFICIENCY WITHOUT ANEMIA: ICD-10-CM

## 2022-04-05 LAB
ERYTHROCYTE [DISTWIDTH] IN BLOOD BY AUTOMATED COUNT: 12.8 % (ref 10–15)
HCT VFR BLD AUTO: 37.4 % (ref 35–47)
HGB BLD-MCNC: 12.1 G/DL (ref 11.7–15.7)
MCH RBC QN AUTO: 30.1 PG (ref 26.5–33)
MCHC RBC AUTO-ENTMCNC: 32.4 G/DL (ref 31.5–36.5)
MCV RBC AUTO: 93 FL (ref 78–100)
PLATELET # BLD AUTO: 365 10E3/UL (ref 150–450)
RBC # BLD AUTO: 4.02 10E6/UL (ref 3.8–5.2)
VIT B12 SERPL-MCNC: 553 PG/ML (ref 193–986)
WBC # BLD AUTO: 4.6 10E3/UL (ref 4–11)

## 2022-04-05 PROCEDURE — 36415 COLL VENOUS BLD VENIPUNCTURE: CPT | Performed by: FAMILY MEDICINE

## 2022-04-05 PROCEDURE — 80061 LIPID PANEL: CPT | Performed by: FAMILY MEDICINE

## 2022-04-05 PROCEDURE — G0439 PPPS, SUBSEQ VISIT: HCPCS | Performed by: FAMILY MEDICINE

## 2022-04-05 PROCEDURE — 85027 COMPLETE CBC AUTOMATED: CPT | Performed by: FAMILY MEDICINE

## 2022-04-05 PROCEDURE — 96372 THER/PROPH/DIAG INJ SC/IM: CPT | Performed by: FAMILY MEDICINE

## 2022-04-05 PROCEDURE — 99214 OFFICE O/P EST MOD 30 MIN: CPT | Mod: 25 | Performed by: FAMILY MEDICINE

## 2022-04-05 PROCEDURE — 84443 ASSAY THYROID STIM HORMONE: CPT | Performed by: FAMILY MEDICINE

## 2022-04-05 PROCEDURE — 82607 VITAMIN B-12: CPT | Performed by: FAMILY MEDICINE

## 2022-04-05 PROCEDURE — 80053 COMPREHEN METABOLIC PANEL: CPT | Performed by: FAMILY MEDICINE

## 2022-04-05 RX ORDER — CYANOCOBALAMIN 1000 UG/ML
1 INJECTION, SOLUTION INTRAMUSCULAR; SUBCUTANEOUS
Qty: 1 ML | Refills: 11 | Status: SHIPPED | OUTPATIENT
Start: 2022-04-05 | End: 2024-04-03

## 2022-04-05 RX ORDER — LANOLIN ALCOHOL/MO/W.PET/CERES
3 CREAM (GRAM) TOPICAL
Qty: 90 TABLET | Refills: 3 | Status: SHIPPED | OUTPATIENT
Start: 2022-04-05 | End: 2024-09-16

## 2022-04-05 RX ORDER — LISINOPRIL 5 MG/1
5 TABLET ORAL DAILY
Qty: 90 TABLET | Refills: 3 | Status: SHIPPED | OUTPATIENT
Start: 2022-04-05 | End: 2023-04-21

## 2022-04-05 RX ADMIN — CYANOCOBALAMIN 1000 MCG: 1000 INJECTION, SOLUTION INTRAMUSCULAR; SUBCUTANEOUS at 11:12

## 2022-04-05 ASSESSMENT — PATIENT HEALTH QUESTIONNAIRE - PHQ9: SUM OF ALL RESPONSES TO PHQ QUESTIONS 1-9: 3

## 2022-04-05 ASSESSMENT — PAIN SCALES - GENERAL: PAINLEVEL: SEVERE PAIN (6)

## 2022-04-05 NOTE — LETTER
April 7, 2022      Keiko Kimball  [address] [facility]   [place] [address]        Dear ,    We are writing to inform you of your test results.    Your Thyroid test, or TSH , is normal.   Your cholesterol tests are normal.   Normal electrolyte panel. The sodium, potassium, liver,sugar and kidneys are all normal       Resulted Orders   Vitamin B12   Result Value Ref Range    Vitamin B12 553 193 - 986 pg/mL   Lipid panel reflex to direct LDL Fasting   Result Value Ref Range    Cholesterol 188 <200 mg/dL    Triglycerides 104 <150 mg/dL    Direct Measure HDL 53 >=50 mg/dL    LDL Cholesterol Calculated 114 (H) <=100 mg/dL    Non HDL Cholesterol 135 (H) <130 mg/dL    Patient Fasting > 8hrs? Yes     Narrative    Cholesterol  Desirable:  <200 mg/dL    Triglycerides  Normal:  Less than 150 mg/dL  Borderline High:  150-199 mg/dL  High:  200-499 mg/dL  Very High:  Greater than or equal to 500 mg/dL    Direct Measure HDL  Female:  Greater than or equal to 50 mg/dL   Male:  Greater than or equal to 40 mg/dL    LDL Cholesterol  Desirable:  <100mg/dL  Above Desirable:  100-129 mg/dL   Borderline High:  130-159 mg/dL   High:  160-189 mg/dL   Very High:  >= 190 mg/dL    Non HDL Cholesterol  Desirable:  130 mg/dL  Above Desirable:  130-159 mg/dL  Borderline High:  160-189 mg/dL  High:  190-219 mg/dL  Very High:  Greater than or equal to 220 mg/dL   Comprehensive metabolic panel (BMP + Alb, Alk Phos, ALT, AST, Total. Bili, TP)   Result Value Ref Range    Sodium 138 133 - 144 mmol/L    Potassium 4.0 3.4 - 5.3 mmol/L    Chloride 106 94 - 109 mmol/L    Carbon Dioxide (CO2) 24 20 - 32 mmol/L    Anion Gap 8 3 - 14 mmol/L    Urea Nitrogen 13 7 - 30 mg/dL    Creatinine 0.63 0.52 - 1.04 mg/dL    Calcium 9.1 8.5 - 10.1 mg/dL    Glucose 95 70 - 99 mg/dL    Alkaline Phosphatase 101 40 - 150 U/L    AST 21 0 - 45 U/L    ALT 17 0 - 50 U/L    Protein Total 6.7 (L) 6.8 - 8.8 g/dL    Albumin 3.5 3.4 - 5.0 g/dL    Bilirubin Total  0.7 0.2 - 1.3 mg/dL    GFR Estimate >90 >60 mL/min/1.73m2      Comment:      Effective December 21, 2021 eGFRcr in adults is calculated using the 2021 CKD-EPI creatinine equation which includes age and gender (Brian et al., NEJ, DOI: 10.1056/BFNApt3790165)   CBC with platelets   Result Value Ref Range    WBC Count 4.6 4.0 - 11.0 10e3/uL    RBC Count 4.02 3.80 - 5.20 10e6/uL    Hemoglobin 12.1 11.7 - 15.7 g/dL    Hematocrit 37.4 35.0 - 47.0 %    MCV 93 78 - 100 fL    MCH 30.1 26.5 - 33.0 pg    MCHC 32.4 31.5 - 36.5 g/dL    RDW 12.8 10.0 - 15.0 %    Platelet Count 365 150 - 450 10e3/uL   TSH with free T4 reflex   Result Value Ref Range    TSH 2.01 0.40 - 4.00 mU/L       If you have any questions or concerns, please call the clinic at the number listed above.       Sincerely,      Brittnee Sharma MD

## 2022-04-05 NOTE — PROGRESS NOTES
Assessment & Plan     Medicare annual wellness visit, subsequent  Due for labs  - Lipid panel reflex to direct LDL Fasting; Future  - Comprehensive metabolic panel (BMP + Alb, Alk Phos, ALT, AST, Total. Bili, TP); Future  - CBC with platelets; Future  - TSH with free T4 reflex; Future  - Lipid panel reflex to direct LDL Fasting  - Comprehensive metabolic panel (BMP + Alb, Alk Phos, ALT, AST, Total. Bili, TP)  - CBC with platelets  - TSH with free T4 reflex    Complex regional pain syndrome type 1 of right upper extremity  Will renew medical cannabis. Cont ES tylenol and topical voltaren gel. Avoid tylenol pm and muscle relaxants    Vitamin B12 deficiency without anemia  Recheck, cont same  - Vitamin B12; Future  - Vitamin B12    Hypertension, unspecified type  Controlled, BP on the low side, pt wants to cont low dose, no sx of low BP  - lisinopril (ZESTRIL) 5 MG tablet; Take 1 tablet (5 mg) by mouth daily    Insomnia, unspecified type  Will trial melatonin  - melatonin 3 MG tablet; Take 1 tablet (3 mg) by mouth nightly as needed for sleep    Stress  Due to health and living situation. declines medications and referral    Vitamin B12 deficiency (non anemic)  Cont same, will renew, checking labs  - cyanocobalamin (CYANOCOBALAMIN) 1000 MCG/ML injection; Inject 1 mL (1,000 mcg) into the muscle every 30 days    Ordering of each unique test  Prescription drug management         Regular exercise    Return in about 6 months (around 10/5/2022) for High blood pressure med check.    Brittnee Sharma MD  [facility] NANCYMOMELINDA Aden is a 71 year old who presents for the following health issues     HPI     Hypertension Follow-up      Do you check your blood pressure regularly outside of the clinic? No     Are you following a low salt diet? No    Are your blood pressures ever more than 140 on the top number (systolic) OR more   than 90 on the bottom number (diastolic), for example 140/90?        How many servings of fruits and vegetables do you eat daily?  2-3    On average, how many sweetened beverages do you drink each day (Examples: soda, juice, sweet tea, etc.  Do NOT count diet or artificially sweetened beverages)?   1    How many days per week do you exercise enough to make your heart beat faster? 5    How many minutes a day do you exercise enough to make your heart beat faster? 60 or more    How many days per week do you miss taking your medication? 0    Taking lisinopril 5mg     Medication Followup of B12     Taking Medication as prescribed: yes    Side Effects:  None    Medication Helping Symptoms:  yes   Patient has been experiencing bilious reflux, fullness after meals,belching and eructation,abdominal bloating,bilious reflux,symptoms primarily relate to meals, and lying down after meals for many minutes, sudden over time. ROS: patient denies abdominal pain, chest pain, weight loss or weight loss,dysphagia,black stools. Social history: no or minimal alcohol, nonsmoker, no or mild caffeine use, no ASA or NSAID's.  Taking prilosec 40mg, eating smaller meals, snacking on crackers and grazes, was able to gain a bit of weight this time.    Pt just got her toilet replaced, they put in the wrong size, used to have handicap size, so had to put in a raised seat.   He key to the apartment complex is not work    Chronic pain, hx of narcotics in the past, now using medical cannabis, but rarely, due to cost  She is not sleeping well, feels body pain, tense, asking for a mild muscle relaxant.   Toss and turn, not comfortable.  Orthopedics gave her something and it was terrible.  She takes 2 ES tylenol at bedtime.   Using voltaren gel on her knees and uses every evening.      Annual Wellness Visit    Patient has been advised of split billing requirements and indicates understanding: Yes     Are you in the first 12 months of your Medicare Part B coverage?  No    Physical Health:    In general, how would  "you rate your overall physical health? fair    Outside of work, how many days during the week do you exercise?6-7 days/week    Outside of work, approximately how many minutes a day do you exercise?30-45 minutes    If you drink alcohol do you typically have >3 drinks per day or >7 drinks per week? Not Applicable    Do you usually eat at least 4 servings of fruit and vegetables a day, include whole grains & fiber and avoid regularly eating high fat or \"junk\" foods? Yes    Do you have any problems taking medications regularly? No    Do you have any side effects from medications? none    Needs assistance for the following daily activities: no assistance needed    Which of the following safety concerns are present in your home?  none identified     Hearing impairment: Yes, right ear deafness    In the past 6 months, have you been bothered by leaking of urine? no    Mental Health:    In general, how would you rate your overall mental or emotional health? fair  PHQ-2 Score:      Do you feel safe in your environment? Yes    Have you ever done Advance Care Planning? (For example, a Health Directive, POLST, or a discussion with a medical provider or your loved ones about your wishes)? Yes, advance care planning is on file.    Fall risk:  Fallen 2 or more times in the past year?: No  Any fall with injury in the past year?: No    Cognitive Screenin) Repeat 3 items (Leader, Season, Table)    2) Clock draw: NORMAL  3) 3 item recall: Recalls 3 objects  Results: 3 items recalled: COGNITIVE IMPAIRMENT LESS LIKELY    Mini-CogTM Copyright BREANNE Carbajal. Licensed by the author for use in [facility]; reprinted with permission (raquel@.[facility]). All rights reserved.      Do you have sleep apnea, excessive snoring or daytime drowsiness?: no    Current providers sharing in care for this patient include:   Patient Care Team:  Brittnee Sharma MD as PCP - General (Family Practice)  Brittnee Sharma MD as Assigned " PCP    Patient has been advised of split billing requirements and indicates understanding: Yes    Review of Systems   Constitutional, HEENT, cardiovascular, pulmonary, gi and gu systems are negative, except as otherwise noted.  gets down sometimes, all her friends have  and lives alone. Vision is getting worse. Body pains on and off, not wanting any medications. Willing to see therapist.      Objective    BP 92/68 (BP Location: Right arm, Patient Position: Sitting, Cuff Size: Adult Regular)   Pulse 65   Temp 97.9  F (36.6  C) (Oral)   Resp 16   Wt 59 kg (130 lb)   SpO2 100%   BMI 29.15 kg/m    Body mass index is 29.15 kg/m .  Physical Exam   GENERAL: healthy, alert and no distress  EYES: Eyes grossly normal to inspection, PERRL and conjunctivae and sclerae normal  HENT: ear canals and TM's normal, nose and mouth without ulcers or lesions  NECK: no adenopathy, no asymmetry, masses, or scars and thyroid normal to palpation  RESP: lungs clear to auscultation - no rales, rhonchi or wheezes  CV: regular rate and rhythm, normal S1 S2, no S3 or S4, no murmur, click or rub, no peripheral edema and peripheral pulses strong  MS: right arm is in sling  NEURO: Normal strength and tone, sensory exam grossly normal, mentation intact and severe kyphosis noted

## 2022-04-05 NOTE — LETTER
April 5, 2022      Keiko Kimball  [address] [facility]   [place] [address]        Dear ,    We are writing to inform you of your test results.    The b12 is normal and in the good range, lets not change a thing.   Your cbc is normal, showing no anemia or signs of infection.     Brittnee Sharma Family [facility], MD       Resulted Orders   Vitamin B12   Result Value Ref Range    Vitamin B12 553 193 - 986 pg/mL   CBC with platelets   Result Value Ref Range    WBC Count 4.6 4.0 - 11.0 10e3/uL    RBC Count 4.02 3.80 - 5.20 10e6/uL    Hemoglobin 12.1 11.7 - 15.7 g/dL    Hematocrit 37.4 35.0 - 47.0 %    MCV 93 78 - 100 fL    MCH 30.1 26.5 - 33.0 pg    MCHC 32.4 31.5 - 36.5 g/dL    RDW 12.8 10.0 - 15.0 %    Platelet Count 365 150 - 450 10e3/uL       If you have any questions or concerns, please call the clinic at the number listed above.

## 2022-04-05 NOTE — PROGRESS NOTES
Clinic Administered Medication Documentation    Administrations This Visit     cyanocobalamin injection 1,000 mcg     Admin Date  04/05/2022 Action  Given Dose  1,000 mcg Route  Intramuscular Site  Other (see comments) Administered By  Belen Root MA    Ordering Provider: Brittnee Sharma MD    Patient Supplied?: No                  Injectable Medication Documentation    Patient was given Cyanocobalamin (B-12). Prior to medication administration, verified patients identity using patient s name and date of birth. Please see MAR and medication order for additional information. Patient instructed to remain in clinic for 15 minutes.      Was entire vial of medication used? Yes  Vial/Syringe: Single dose vial  Expiration Date:  11/30/2023  Was this medication supplied by the patient? No     Given in right lower abdomen due to pain in left upper arm, and not being able to use right arm at all per patient.     Belen Root MA

## 2022-04-06 LAB
ALBUMIN SERPL-MCNC: 3.5 G/DL (ref 3.4–5)
ALP SERPL-CCNC: 101 U/L (ref 40–150)
ALT SERPL W P-5'-P-CCNC: 17 U/L (ref 0–50)
ANION GAP SERPL CALCULATED.3IONS-SCNC: 8 MMOL/L (ref 3–14)
AST SERPL W P-5'-P-CCNC: 21 U/L (ref 0–45)
BILIRUB SERPL-MCNC: 0.7 MG/DL (ref 0.2–1.3)
BUN SERPL-MCNC: 13 MG/DL (ref 7–30)
CALCIUM SERPL-MCNC: 9.1 MG/DL (ref 8.5–10.1)
CHLORIDE BLD-SCNC: 106 MMOL/L (ref 94–109)
CHOLEST SERPL-MCNC: 188 MG/DL
CO2 SERPL-SCNC: 24 MMOL/L (ref 20–32)
CREAT SERPL-MCNC: 0.63 MG/DL (ref 0.52–1.04)
FASTING STATUS PATIENT QL REPORTED: YES
GFR SERPL CREATININE-BSD FRML MDRD: >90 ML/MIN/1.73M2
GLUCOSE BLD-MCNC: 95 MG/DL (ref 70–99)
HDLC SERPL-MCNC: 53 MG/DL
LDLC SERPL CALC-MCNC: 114 MG/DL
NONHDLC SERPL-MCNC: 135 MG/DL
POTASSIUM BLD-SCNC: 4 MMOL/L (ref 3.4–5.3)
PROT SERPL-MCNC: 6.7 G/DL (ref 6.8–8.8)
SODIUM SERPL-SCNC: 138 MMOL/L (ref 133–144)
TRIGL SERPL-MCNC: 104 MG/DL
TSH SERPL DL<=0.005 MIU/L-ACNC: 2.01 MU/L (ref 0.4–4)

## 2022-04-06 RX ORDER — CYANOCOBALAMIN 1000 UG/ML
1000 INJECTION, SOLUTION INTRAMUSCULAR; SUBCUTANEOUS
Status: ACTIVE | OUTPATIENT
Start: 2022-04-06 | End: 2023-04-01

## 2022-04-06 RX ORDER — CYANOCOBALAMIN 1000 UG/ML
INJECTION, SOLUTION INTRAMUSCULAR; SUBCUTANEOUS
Qty: 3 ML | OUTPATIENT
Start: 2022-04-06

## 2022-05-02 ENCOUNTER — ALLIED HEALTH/NURSE VISIT (OUTPATIENT)
Dept: FAMILY MEDICINE | Facility: CLINIC | Age: 72
End: 2022-05-02
Payer: MEDICARE

## 2022-05-02 DIAGNOSIS — E53.8 VITAMIN B12 DEFICIENCY WITHOUT ANEMIA: Primary | ICD-10-CM

## 2022-05-02 PROCEDURE — 99207 PR NO CHARGE NURSE ONLY: CPT

## 2022-05-02 PROCEDURE — 96372 THER/PROPH/DIAG INJ SC/IM: CPT | Performed by: FAMILY MEDICINE

## 2022-05-02 RX ADMIN — CYANOCOBALAMIN 1000 MCG: 1000 INJECTION, SOLUTION INTRAMUSCULAR; SUBCUTANEOUS at 13:33

## 2022-06-01 ENCOUNTER — ALLIED HEALTH/NURSE VISIT (OUTPATIENT)
Dept: FAMILY MEDICINE | Facility: CLINIC | Age: 72
End: 2022-06-01
Payer: MEDICARE

## 2022-06-01 DIAGNOSIS — E53.8 VITAMIN B12 DEFICIENCY WITHOUT ANEMIA: Primary | ICD-10-CM

## 2022-06-01 PROCEDURE — 99207 PR NO CHARGE NURSE ONLY: CPT

## 2022-06-01 PROCEDURE — 96372 THER/PROPH/DIAG INJ SC/IM: CPT | Performed by: FAMILY MEDICINE

## 2022-06-01 RX ADMIN — CYANOCOBALAMIN 1000 MCG: 1000 INJECTION, SOLUTION INTRAMUSCULAR; SUBCUTANEOUS at 13:55

## 2022-07-01 ENCOUNTER — ALLIED HEALTH/NURSE VISIT (OUTPATIENT)
Dept: FAMILY MEDICINE | Facility: CLINIC | Age: 72
End: 2022-07-01
Payer: MEDICARE

## 2022-07-01 DIAGNOSIS — E53.8 VITAMIN B12 DEFICIENCY WITHOUT ANEMIA: Primary | ICD-10-CM

## 2022-07-01 PROCEDURE — 99207 PR NO CHARGE NURSE ONLY: CPT

## 2022-07-01 PROCEDURE — 96372 THER/PROPH/DIAG INJ SC/IM: CPT | Performed by: FAMILY MEDICINE

## 2022-07-01 RX ADMIN — CYANOCOBALAMIN 1000 MCG: 1000 INJECTION, SOLUTION INTRAMUSCULAR; SUBCUTANEOUS at 14:07

## 2022-07-01 NOTE — PROGRESS NOTES
The following medication was given:     MEDICATION: Vitamin B12  1000mcg  ROUTE: IM  SITE: Deltoid - Right  DOSE: 1000mcg  LOT #: [id number]  :  [employer]  EXPIRATION DATE:  01/31/2024  NDC#: 29222-530-94      unk

## 2022-08-01 ENCOUNTER — ALLIED HEALTH/NURSE VISIT (OUTPATIENT)
Dept: FAMILY MEDICINE | Facility: CLINIC | Age: 72
End: 2022-08-01
Payer: MEDICARE

## 2022-08-01 DIAGNOSIS — E53.8 VITAMIN B12 DEFICIENCY WITHOUT ANEMIA: Primary | ICD-10-CM

## 2022-08-01 PROCEDURE — 96372 THER/PROPH/DIAG INJ SC/IM: CPT | Performed by: FAMILY MEDICINE

## 2022-08-01 PROCEDURE — 99207 PR NO CHARGE NURSE ONLY: CPT

## 2022-08-01 RX ORDER — CYANOCOBALAMIN 1000 UG/ML
1000 INJECTION, SOLUTION INTRAMUSCULAR; SUBCUTANEOUS
Status: CANCELLED | OUTPATIENT
Start: 2022-08-01 | End: 2023-07-27

## 2022-08-01 RX ADMIN — CYANOCOBALAMIN 1000 MCG: 1000 INJECTION, SOLUTION INTRAMUSCULAR; SUBCUTANEOUS at 15:10

## 2022-08-01 NOTE — PROGRESS NOTES
The following medication was given:     MEDICATION: Vitamin B12  1000mcg  ROUTE: IM  SITE: Deltoid - Right  DOSE: 1000mcg  LOT #: [id number]  :  [employer]  EXPIRATION DATE:  01/31/2024  NDC#: 20778-948-71      Omar

## 2022-08-16 ENCOUNTER — TELEPHONE (OUTPATIENT)
Dept: FAMILY MEDICINE | Facility: CLINIC | Age: 72
End: 2022-08-16

## 2022-08-16 NOTE — TELEPHONE ENCOUNTER
Rec'd ICD-10 Verification Code from [place]. Placed in PCP basket for review and signature. Fax [phone]    Taylor Case-

## 2022-09-01 ENCOUNTER — ALLIED HEALTH/NURSE VISIT (OUTPATIENT)
Dept: FAMILY MEDICINE | Facility: CLINIC | Age: 72
End: 2022-09-01
Payer: MEDICARE

## 2022-09-01 DIAGNOSIS — E53.8 VITAMIN B12 DEFICIENCY WITHOUT ANEMIA: Primary | ICD-10-CM

## 2022-09-01 PROCEDURE — 96372 THER/PROPH/DIAG INJ SC/IM: CPT | Performed by: FAMILY MEDICINE

## 2022-09-01 PROCEDURE — 99207 PR NO CHARGE NURSE ONLY: CPT

## 2022-09-01 RX ADMIN — CYANOCOBALAMIN 1000 MCG: 1000 INJECTION, SOLUTION INTRAMUSCULAR; SUBCUTANEOUS at 14:00

## 2022-09-01 NOTE — PROGRESS NOTES
.The following medication was given:      MEDICATION: Vitamin B12  1000mcg  ROUTE: IM  SITE: Deltoid - Right  DOSE: 1000mcg  LOT #: [id number]  :  [employer]  EXPIRATION DATE:  01/31/2024  NDC#: 52584-865-46

## 2022-09-28 NOTE — TELEPHONE ENCOUNTER
I received a call from [facility] stating the information that was received was not sufficient.  They sent over a fax outlining the information that is needed.    Patient will need a face to face visit (or video and audio visit) in order to qualify.      Medicare will also require the completion of the 7 element order.  See attached.  This form must me completed and signed within 45 days of the Face to face evaluation.    Please clarify what the patient needs.  Power wheel chair or Scooter?    Form at my desk. (Shruthi)       Nephrology Progress Note  Neto Amble  Date of Admission : 9/9/2022    CC:  Follow up for JEMIMA       Assessment and Plan     JEMIMA on HD:  - 2/2 ATN  - HD dependent --> switching to TTS schedule  - PC placed 9/16  - next HD tomorrow     Anemia:  - MICHAEL MWF    PAD s/p b/l BKA    Recent sepsis    Empyema    ABD wall abscess:  - J-tube removal, colostomy, I&D of abd wound  - TPN per primary team     Sacral decub       Interval History:  Seen and examined. Remains on TPN. Seen eating breakgfast. Denies any N/V/abdo poain/ CP/SOB today      Current Medications: all current  Medications have been eviewed in EPIC  Review of Systems: A comprehensive review of systems was negative except for that written in the HPI. Objective:  Vitals:    Vitals:    09/28/22 0440 09/28/22 0600 09/28/22 0826 09/28/22 1000   BP: (!) 117/58  112/67    Pulse: 73 67 66 68   Resp: 17  16    Temp: 97.6 °F (36.4 °C)  98.1 °F (36.7 °C)    TempSrc:       SpO2: 100%  100%    Weight:       Height:         Intake and Output:  No intake/output data recorded. 09/26 1901 - 09/28 0700  In: -   Out: 1200     Physical Examination:  General: Confused   Neck:  Supple, no mass  Resp:  Lungs CTA B/L, no wheezing , normal respiratory effort  CV:  RRR,  no murmur or rub, no LE edema  GI:  Soft, NT, + Bowel sounds, + ostomy  Neurologic:  Confused   Access:           [facility]    []    High complexity decision making was performed  []    Patient is at high-risk of decompensation with multiple organ involvement    Lab Data Personally Reviewed: I have reviewed all the pertinent labs, microbiology data and radiology studies during assessment.     Recent Labs     09/28/22  0434 09/27/22  0527 09/26/22  0457   * 136 136   K 4.7 4.1 3.0*    104 108   CO2 25 26 22   GLU 89 66 83   BUN 59* 31* 43*   CREA 2.38* 1.67* 2.98*   CA 7.7* 7.5* 7.4*   MG 1.8 1.7 1.6   PHOS 3.7 2.4* 2.9   ALB 1.2* 1.1*  --    ALT <6* <6*  --        Recent Labs     09/28/22  1031 09/27/22  0527 09/26/22  1848 09/26/22  0457   WBC 14.2* 15.5*  --  14.9*   HGB 7.6* 8.1* 8.3* 6.7*   HCT 24.1* 25.1* 26.3* 22.1*    345  --  410*       No results found for: SDES  Lab Results   Component Value Date/Time    Culture result: PENDING 09/27/2022 12:18 PM    Culture result: NO GROWTH 6 DAYS 09/16/2022 08:45 PM    Culture result: NO GROWTH 6 DAYS 09/09/2022 08:52 PM     Recent Results (from the past 24 hour(s))   CULTURE, WOUND W GRAM STAIN    Collection Time: 09/27/22 12:18 PM    Specimen: Leg; Wound   Result Value Ref Range    Special Requests: NO SPECIAL REQUESTS      GRAM STAIN NO WBC'S SEEN      GRAM STAIN RARE APPARENT GRAM NEGATIVE RODS      Culture result: PENDING    GLUCOSE, POC    Collection Time: 09/27/22 12:40 PM   Result Value Ref Range    Glucose (POC) 80 65 - 117 mg/dL    Performed by Tri Ambrocio (RN)    GLUCOSE, POC    Collection Time: 09/27/22  5:25 PM   Result Value Ref Range    Glucose (POC) 76 65 - 117 mg/dL    Performed by Lazaro Lemus [address], POC    Collection Time: 09/27/22 11:17 PM   Result Value Ref Range    Glucose (POC) 95 65 - 117 mg/dL    Performed by Idalia León    TRIGLYCERIDE    Collection Time: 09/28/22  4:34 AM   Result Value Ref Range    Triglyceride 98 <150 MG/DL   CBC WITH AUTOMATED DIFF    Collection Time: 09/28/22  4:34 AM   Result Value Ref Range    WBC 14.2 (H) 4.1 - 11.1 K/uL    RBC 2.61 (L) 4.10 - 5.70 M/uL    HGB 7.6 (L) 12.1 - 17.0 g/dL    HCT 24.1 (L) 36.6 - 50.3 %    MCV 92.3 80.0 - 99.0 FL    MCH 29.1 26.0 - 34.0 PG    MCHC 31.5 30.0 - 36.5 g/dL    RDW 20.7 (H) 11.5 - 14.5 %    PLATELET 846 269 - 141 K/uL    MPV 9.7 8.9 - 12.9 FL    NRBC 0.0 0  WBC    ABSOLUTE NRBC 0.00 0.00 - 0.01 K/uL    NEUTROPHILS 72 32 - 75 %    LYMPHOCYTES 14 12 - 49 %    MONOCYTES 7 5 - 13 %    EOSINOPHILS 4 0 - 7 %    BASOPHILS 1 0 - 1 %    IMMATURE GRANULOCYTES 2 (H) 0.0 - 0.5 %    ABS. NEUTROPHILS 10.2 (H) 1.8 - 8.0 K/UL    ABS.  LYMPHOCYTES 2.0 0.8 - 3.5 K/UL    ABS. MONOCYTES 1.0 0.0 - 1.0 K/UL    ABS. EOSINOPHILS 0.6 (H) 0.0 - 0.4 K/UL    ABS. BASOPHILS 0.1 0.0 - 0.1 K/UL    ABS. IMM. GRANS. 0.3 (H) 0.00 - 0.04 K/UL    DF SMEAR SCANNED      RBC COMMENTS ANISOCYTOSIS  2+        RBC COMMENTS JOSE CELLS  PRESENT        RBC COMMENTS POLYCHROMASIA  PRESENT       MAGNESIUM    Collection Time: 09/28/22  4:34 AM   Result Value Ref Range    Magnesium 1.8 1.6 - 2.4 mg/dL   PHOSPHORUS    Collection Time: 09/28/22  4:34 AM   Result Value Ref Range    Phosphorus 3.7 2.6 - 4.7 MG/DL   METABOLIC PANEL, COMPREHENSIVE    Collection Time: 09/28/22  4:34 AM   Result Value Ref Range    Sodium 135 (L) 136 - 145 mmol/L    Potassium 4.7 3.5 - 5.1 mmol/L    Chloride 104 97 - 108 mmol/L    CO2 25 21 - 32 mmol/L    Anion gap 6 5 - 15 mmol/L    Glucose 89 65 - 100 mg/dL    BUN 59 (H) 6 - 20 MG/DL    Creatinine 2.38 (H) 0.70 - 1.30 MG/DL    BUN/Creatinine ratio 25 (H) 12 - 20      GFR est AA 35 (L) >60 ml/min/1.73m2    GFR est non-AA 29 (L) >60 ml/min/1.73m2    Calcium 7.7 (L) 8.5 - 10.1 MG/DL    Bilirubin, total 0.3 0.2 - 1.0 MG/DL    ALT (SGPT) <6 (L) 12 - 78 U/L    AST (SGOT) 14 (L) 15 - 37 U/L    Alk. phosphatase 110 45 - 117 U/L    Protein, total 5.0 (L) 6.4 - 8.2 g/dL    Albumin 1.2 (L) 3.5 - 5.0 g/dL    Globulin 3.8 2.0 - 4.0 g/dL    A-G Ratio 0.3 (L) 1.1 - 2.2     GLUCOSE, POC    Collection Time: 09/28/22  6:36 AM   Result Value Ref Range    Glucose (POC) 122 (H) 65 - 117 mg/dL    Performed by Madison Barillas MD  [address] [place] [address] [place] [address]  Phone - [phone]   Fax - [phone]  [email or web address]

## 2022-10-03 ENCOUNTER — ALLIED HEALTH/NURSE VISIT (OUTPATIENT)
Dept: FAMILY MEDICINE | Facility: CLINIC | Age: 72
End: 2022-10-03
Payer: MEDICARE

## 2022-10-03 DIAGNOSIS — E53.8 VITAMIN B12 DEFICIENCY WITHOUT ANEMIA: Primary | ICD-10-CM

## 2022-10-03 PROCEDURE — 96372 THER/PROPH/DIAG INJ SC/IM: CPT | Performed by: FAMILY MEDICINE

## 2022-10-03 PROCEDURE — 99207 PR NO CHARGE NURSE ONLY: CPT

## 2022-10-03 RX ADMIN — CYANOCOBALAMIN 1000 MCG: 1000 INJECTION, SOLUTION INTRAMUSCULAR; SUBCUTANEOUS at 13:49

## 2022-10-11 ENCOUNTER — OFFICE VISIT (OUTPATIENT)
Dept: FAMILY MEDICINE | Facility: CLINIC | Age: 72
End: 2022-10-11
Payer: MEDICARE

## 2022-10-11 VITALS
HEART RATE: 65 BPM | WEIGHT: 134 LBS | DIASTOLIC BLOOD PRESSURE: 70 MMHG | BODY MASS INDEX: 30.04 KG/M2 | OXYGEN SATURATION: 97 % | TEMPERATURE: 98 F | SYSTOLIC BLOOD PRESSURE: 108 MMHG | RESPIRATION RATE: 16 BRPM

## 2022-10-11 DIAGNOSIS — I10 ESSENTIAL HYPERTENSION, BENIGN: Primary | ICD-10-CM

## 2022-10-11 DIAGNOSIS — E53.8 VITAMIN B12 DEFICIENCY WITHOUT ANEMIA: ICD-10-CM

## 2022-10-11 DIAGNOSIS — G90.511 COMPLEX REGIONAL PAIN SYNDROME TYPE 1 OF RIGHT UPPER EXTREMITY: ICD-10-CM

## 2022-10-11 PROCEDURE — 99214 OFFICE O/P EST MOD 30 MIN: CPT | Performed by: FAMILY MEDICINE

## 2022-10-11 ASSESSMENT — PAIN SCALES - GENERAL: PAINLEVEL: NO PAIN (0)

## 2022-10-11 NOTE — PROGRESS NOTES
"  Assessment & Plan     Essential hypertension, benign  controlled    Complex regional pain syndrome type 1 of right upper extremity  Taking tylenol for pain and rarely medical cannabis    Vitamin B12 deficiency without anemia  Gets shots monthly, labs are UTD        Ordering of each unique test  Prescription drug management         BMI:   Estimated body mass index is 30.04 kg/m  as calculated from the following:    Height as of 10/21/21: 1.422 m (4' 8\").    Weight as of this encounter: 60.8 kg (134 lb).   Weight management plan: Discussed healthy diet and exercise guidelines        Return in about 6 months (around 4/11/2023) for High blood pressure med check.    Brittnee Sharma MD  [facility] SHANNAN Aden is a 72 year old accompanied by her    , presenting for the following health issues:  Hypertension      HPI     Hypertension Follow-up      Do you check your blood pressure regularly outside of the clinic? No     Are you following a low salt diet? No    Are your blood pressures ever more than 140 on the top number (systolic) OR more   than 90 on the bottom number (diastolic), for example 140/90?       How many servings of fruits and vegetables do you eat daily?  0-1    On average, how many sweetened beverages do you drink each day (Examples: soda, juice, sweet tea, etc.  Do NOT count diet or artificially sweetened beverages)?   1    How many days per week do you exercise enough to make your heart beat faster? 5    How many minutes a day do you exercise enough to make your heart beat faster? 20 - 29    How many days per week do you miss taking your medication? 0      Has been able to gain some weight, has been working on this. Peanut butter puffs, mashed potatoes, turkey and stuffing. Has to be very careful or will vomit.    Insomnia, 2 hrs per night, but does not nap and unable to sleep well due to fibromyalgia and her arm hurts, rotator cuff tear on the left side also " hurts.  Medical cannabis-has a spray, but scared, since tried THC once.  THC picked up as a chewable and took this for sleep and work up with a hangover headache and dizzy and dry heaves, and will never do that again.  Not taking anything for pain. Just tylenol now. Tries not to swell on it.      Using a cart with walking at store, no cane or walker.    Osteopenia, not taking vitamin D, sits in the sun in summer. Declines dexa    Declining mammogram      Review of Systems   Constitutional, HEENT, cardiovascular, pulmonary, gi and gu systems are negative, except as otherwise noted.      Objective    /70 (BP Location: Right arm, Patient Position: Sitting, Cuff Size: Adult Regular)   Pulse 65   Temp 98  F (36.7  C) (Oral)   Resp 16   Wt 60.8 kg (134 lb)   SpO2 97%   BMI 30.04 kg/m    Body mass index is 30.04 kg/m .  Physical Exam   GENERAL: healthy, alert and no distress, tan  EYES: Eyes grossly normal to inspection, and conjunctivae and sclerae normal  HENT: ear canals and TM's normal, nose and mouth without ulcers or lesions  NECK: no adenopathy, no asymmetry, masses, or scars and thyroid normal to palpation  RESP: lungs clear to auscultation - no rales, rhonchi or wheezes  CV: regular rate and rhythm, normal S1 S2, no S3 or S4, no murmur, click or rub, no peripheral edema and peripheral pulses strong  ABDOMEN: soft, nontender, bowel sounds normal, did not get up from chair  MS:right arm is not usable, not in shoulder socket, no brace today, no edema  Kyphosis noted with standing  Not needing cane or walker, gait is good  SKIN: no suspicious lesions or rashes  NEURO: Normal strength and tone, mentation intact and speech normal  PSYCH: mentation appears normal, affect normal/bright

## 2022-11-01 ENCOUNTER — ALLIED HEALTH/NURSE VISIT (OUTPATIENT)
Dept: FAMILY MEDICINE | Facility: CLINIC | Age: 72
End: 2022-11-01
Payer: MEDICARE

## 2022-11-01 DIAGNOSIS — E53.8 VITAMIN B12 DEFICIENCY WITHOUT ANEMIA: Primary | ICD-10-CM

## 2022-11-01 PROCEDURE — 99207 PR NO CHARGE NURSE ONLY: CPT

## 2022-11-01 PROCEDURE — 96372 THER/PROPH/DIAG INJ SC/IM: CPT | Performed by: FAMILY MEDICINE

## 2022-11-01 RX ADMIN — CYANOCOBALAMIN 1000 MCG: 1000 INJECTION, SOLUTION INTRAMUSCULAR; SUBCUTANEOUS at 13:58

## 2022-11-11 ENCOUNTER — TELEPHONE (OUTPATIENT)
Dept: FAMILY MEDICINE | Facility: CLINIC | Age: 72
End: 2022-11-11

## 2022-11-11 NOTE — TELEPHONE ENCOUNTER
Patient calling with concern of yeast infection.  States she is having discharge and burning x 1 1/2 days ago.  States she can not come in.  Wants home remedy.  Discussed OTC treatment options.  Patient will do OTC treatment.  Penny Oliver RN

## 2022-11-13 DIAGNOSIS — I10 HYPERTENSION, UNSPECIFIED TYPE: ICD-10-CM

## 2022-11-14 RX ORDER — LISINOPRIL 5 MG/1
TABLET ORAL
Qty: 90 TABLET | Refills: 3 | OUTPATIENT
Start: 2022-11-14

## 2022-12-02 ENCOUNTER — ALLIED HEALTH/NURSE VISIT (OUTPATIENT)
Dept: FAMILY MEDICINE | Facility: CLINIC | Age: 72
End: 2022-12-02
Payer: MEDICARE

## 2022-12-02 DIAGNOSIS — E53.8 VITAMIN B12 DEFICIENCY WITHOUT ANEMIA: Primary | ICD-10-CM

## 2022-12-02 PROCEDURE — 99207 PR NO CHARGE NURSE ONLY: CPT

## 2022-12-02 PROCEDURE — 96372 THER/PROPH/DIAG INJ SC/IM: CPT | Performed by: FAMILY MEDICINE

## 2022-12-02 RX ADMIN — CYANOCOBALAMIN 1000 MCG: 1000 INJECTION, SOLUTION INTRAMUSCULAR; SUBCUTANEOUS at 14:07

## 2023-01-02 ENCOUNTER — TELEPHONE (OUTPATIENT)
Dept: FAMILY MEDICINE | Facility: CLINIC | Age: 73
End: 2023-01-02

## 2023-01-02 ENCOUNTER — ALLIED HEALTH/NURSE VISIT (OUTPATIENT)
Dept: FAMILY MEDICINE | Facility: CLINIC | Age: 73
End: 2023-01-02
Payer: MEDICARE

## 2023-01-02 DIAGNOSIS — E53.8 VITAMIN B12 DEFICIENCY WITHOUT ANEMIA: Primary | ICD-10-CM

## 2023-01-02 PROCEDURE — 99207 PR NO CHARGE NURSE ONLY: CPT

## 2023-01-02 PROCEDURE — 96372 THER/PROPH/DIAG INJ SC/IM: CPT | Performed by: FAMILY MEDICINE

## 2023-01-02 RX ADMIN — CYANOCOBALAMIN 1000 MCG: 1000 INJECTION, SOLUTION INTRAMUSCULAR; SUBCUTANEOUS at 11:12

## 2023-01-02 NOTE — TELEPHONE ENCOUNTER
Patient calling and wants to be worked in for B12 shot today.  Will not be able to walk over tomorrow in the snow.  Please call her back regarding being worked in today.  Penny Oliver RN

## 2023-02-06 ENCOUNTER — ALLIED HEALTH/NURSE VISIT (OUTPATIENT)
Dept: FAMILY MEDICINE | Facility: CLINIC | Age: 73
End: 2023-02-06
Payer: MEDICARE

## 2023-02-06 DIAGNOSIS — E53.8 VITAMIN B12 DEFICIENCY (NON ANEMIC): Primary | ICD-10-CM

## 2023-02-06 PROCEDURE — 96372 THER/PROPH/DIAG INJ SC/IM: CPT | Performed by: FAMILY MEDICINE

## 2023-02-06 PROCEDURE — 99207 PR NO CHARGE NURSE ONLY: CPT

## 2023-02-06 RX ADMIN — CYANOCOBALAMIN 1000 MCG: 1000 INJECTION, SOLUTION INTRAMUSCULAR; SUBCUTANEOUS at 11:00

## 2023-02-06 NOTE — PROGRESS NOTES
Clinic Administered Medication Documentation    Administrations This Visit     cyanocobalamin injection 1,000 mcg     Admin Date  02/06/2023 Action  Given Dose  1,000 mcg Route  Intramuscular Site  Right Deltoid Administered By  Seema Walker CMA    Ordering Provider: Brtitnee Sharma MD    Patient Supplied?: No                  Injectable Medication Documentation    Patient was given Cyanocobalamin (B-12). Prior to medication administration, verified patients identity using patient s name and date of birth. Please see MAR and medication order for additional information. Patient instructed to remain in clinic for 15 minutes.      Was entire vial of medication used? Yes  Vial/Syringe: Single dose vial  Expiration Date:  05/24  Was this medication supplied by the patient? No   Seema Walker CMA

## 2023-03-03 ENCOUNTER — ALLIED HEALTH/NURSE VISIT (OUTPATIENT)
Dept: FAMILY MEDICINE | Facility: CLINIC | Age: 73
End: 2023-03-03
Payer: MEDICARE

## 2023-03-03 DIAGNOSIS — E53.8 VITAMIN B12 DEFICIENCY WITHOUT ANEMIA: Primary | ICD-10-CM

## 2023-03-03 PROCEDURE — 96372 THER/PROPH/DIAG INJ SC/IM: CPT | Performed by: FAMILY MEDICINE

## 2023-03-03 PROCEDURE — 99207 PR NO CHARGE NURSE ONLY: CPT

## 2023-03-03 RX ADMIN — CYANOCOBALAMIN 1000 MCG: 1000 INJECTION, SOLUTION INTRAMUSCULAR; SUBCUTANEOUS at 13:43

## 2023-03-03 NOTE — PROGRESS NOTES
The following medication was given:     MEDICATION: Vitamin B12  1000mcg  ROUTE: IM  SITE: Deltoid - Right  DOSE: 1mL  LOT #: [id number]  :  [facility]  EXPIRATION DATE:  6/2024  NDC#: 09254-635-97   Luly Murillo CMA

## 2023-03-17 ENCOUNTER — TELEPHONE (OUTPATIENT)
Dept: FAMILY MEDICINE | Facility: CLINIC | Age: 73
End: 2023-03-17
Payer: MEDICARE

## 2023-03-17 NOTE — TELEPHONE ENCOUNTER
Received call from pt   She is wanting to know if  has any appts available, pt needs her medical marijuana recert by the end of the month  Pt has an appt in April with another provider    Offered to look for a sooner appt  Pt became frustrated and upset about the appt process and how busy the  Clinic is  Pt disconnected the call    Elias Luna RN on 3/17/2023 at 12:30 PM

## 2023-03-23 ENCOUNTER — TELEPHONE (OUTPATIENT)
Dept: FAMILY MEDICINE | Facility: CLINIC | Age: 73
End: 2023-03-23

## 2023-03-23 ENCOUNTER — TELEPHONE (OUTPATIENT)
Dept: FAMILY MEDICINE | Facility: CLINIC | Age: 73
End: 2023-03-23
Payer: MEDICARE

## 2023-03-23 DIAGNOSIS — E53.8 VITAMIN B12 DEFICIENCY WITHOUT ANEMIA: Primary | ICD-10-CM

## 2023-03-23 NOTE — TELEPHONE ENCOUNTER
Pt requesting if Dr. Sharma will write for her Cannabis prior to her scheduled appointment on 4/26 as the cert expires 4/30 and pt is concerned there wouldn't be enough time.     Pt call back: [phone] Detailed Vm OK    Nadia Balbuena

## 2023-03-23 NOTE — TELEPHONE ENCOUNTER
Order/Referral Request    Who is requesting: Pt    Orders being requested: b12 standing orders    Reason service is needed/diagnosis: b12 injection requires orders for scheduling    When are orders needed by: ASAP (next appt 4/5)    Has this been discussed with Provider: No    Does patient have a preference on a Group/Provider/Facility? -    Does patient have an appointment scheduled?: Yes: 4/5    Where to send orders: Place orders within Epic    Okay to leave a detailed message?: No at Home number on file [phone] (home)    **pt hung up before  could get detailed info**

## 2023-03-24 RX ORDER — CYANOCOBALAMIN 1000 UG/ML
1000 INJECTION, SOLUTION INTRAMUSCULAR; SUBCUTANEOUS
Status: ACTIVE | OUTPATIENT
Start: 2023-03-24 | End: 2024-03-18

## 2023-03-24 NOTE — TELEPHONE ENCOUNTER
Pt reports using one spray under the tongue, about twice a day for pain.  Once in afternoon and once before bed.  Says it is helping her.  Tries to only take if needed.      Rates pain 7/10 before using the cannabis and 5/10 after using it.    Janessa Swartz RN, BSN  [facility]

## 2023-03-24 NOTE — TELEPHONE ENCOUNTER
I am happy to recertify her. I would like to know how well it is working and how often she is using it(just for documentation).  Thank you

## 2023-03-30 ENCOUNTER — TELEPHONE (OUTPATIENT)
Dept: FAMILY MEDICINE | Facility: CLINIC | Age: 73
End: 2023-03-30
Payer: MEDICARE

## 2023-03-30 NOTE — TELEPHONE ENCOUNTER
Patient calling and wanting B12 appt Monday afternoon.  Only openings are in am at 9, 10 or 11 am.  States she has a dental appt in tne am and can not reschedule that appt.  Patient  will be a good news story if she falls as she could not come when she wanted.   lives across the street and walks over and Monday to be a nice day.   will take this up again and has to do what she needs to do.  States if she did not only have one arm she would give herself her shot and we would not get the $200 we get.  discussed nurse visit is a no charge visit.  Patient states she gets charged $200 for the B12.  Advised I could route message to see if able to be worked in.  She wants at 2 pm on Monday.  Patient stated to leave appt as is and if she shows she shows and patient hung-up.  Penny Oliver RN     Constitutional: +fever, sweats, chills  Eyes: no pain, no redness, and no visual changes.  ENMT: no ear pain and no hearing problems, no nasal congestion/drainage, no dysphagia, and no throat pain, no neck pain, no stiffness  CV: no chest pain, no edema.  Resp: no cough, no dyspnea  GI: no abdominal pain, no bloating, no constipation, no diarrhea, no nausea and no vomiting.  : no dysuria, no hematuria  MSK: +back pain, no weakness  Skin: no lesions, and no rashes.  Neuro: no LOC, no headache, no sensory deficits, and no weakness.

## 2023-04-07 ENCOUNTER — ALLIED HEALTH/NURSE VISIT (OUTPATIENT)
Dept: FAMILY MEDICINE | Facility: CLINIC | Age: 73
End: 2023-04-07
Payer: MEDICARE

## 2023-04-07 DIAGNOSIS — E53.8 VITAMIN B12 DEFICIENCY WITHOUT ANEMIA: Primary | ICD-10-CM

## 2023-04-07 PROCEDURE — 96372 THER/PROPH/DIAG INJ SC/IM: CPT | Performed by: FAMILY MEDICINE

## 2023-04-07 PROCEDURE — 99207 PR NO CHARGE NURSE ONLY: CPT

## 2023-04-07 RX ADMIN — CYANOCOBALAMIN 1000 MCG: 1000 INJECTION, SOLUTION INTRAMUSCULAR; SUBCUTANEOUS at 14:55

## 2023-04-20 DIAGNOSIS — I10 HYPERTENSION, UNSPECIFIED TYPE: ICD-10-CM

## 2023-04-20 DIAGNOSIS — K27.9 PEPTIC ULCER: ICD-10-CM

## 2023-04-21 NOTE — TELEPHONE ENCOUNTER
Routing refill request to provider for review/approval because:    Normal serum creatinine on file in past 12 months   Normal serum potassium on file in past 12 months     No diagnosis of osteoporosis on record    Creatinine   Date Value Ref Range Status   04/05/2022 0.63 0.52 - 1.04 mg/dL Final   08/13/2020 0.50 (L) 0.52 - 1.04 mg/dL Final     Potassium   Date Value Ref Range Status   04/05/2022 4.0 3.4 - 5.3 mmol/L Final   08/13/2020 3.6 3.4 - 5.3 mmol/L Final     Ada Esposito Registered Nurse  [facility]

## 2023-04-24 RX ORDER — LISINOPRIL 5 MG/1
TABLET ORAL
Qty: 90 TABLET | Refills: 0 | Status: SHIPPED | OUTPATIENT
Start: 2023-04-24 | End: 2023-04-26

## 2023-04-24 RX ORDER — OMEPRAZOLE 40 MG/1
CAPSULE, DELAYED RELEASE ORAL
Qty: 180 CAPSULE | Refills: 0 | Status: SHIPPED | OUTPATIENT
Start: 2023-04-24 | End: 2023-04-26

## 2023-04-26 ENCOUNTER — OFFICE VISIT (OUTPATIENT)
Dept: FAMILY MEDICINE | Facility: CLINIC | Age: 73
End: 2023-04-26
Payer: MEDICARE

## 2023-04-26 VITALS
BODY MASS INDEX: 30.75 KG/M2 | WEIGHT: 136.7 LBS | SYSTOLIC BLOOD PRESSURE: 129 MMHG | RESPIRATION RATE: 16 BRPM | DIASTOLIC BLOOD PRESSURE: 85 MMHG | TEMPERATURE: 97.7 F | HEART RATE: 80 BPM | HEIGHT: 56 IN | OXYGEN SATURATION: 97 %

## 2023-04-26 DIAGNOSIS — Z12.11 SCREEN FOR COLON CANCER: ICD-10-CM

## 2023-04-26 DIAGNOSIS — Z00.00 ENCOUNTER FOR MEDICARE ANNUAL WELLNESS EXAM: Primary | ICD-10-CM

## 2023-04-26 DIAGNOSIS — K27.9 PEPTIC ULCER: ICD-10-CM

## 2023-04-26 DIAGNOSIS — G90.511 COMPLEX REGIONAL PAIN SYNDROME TYPE 1 OF RIGHT UPPER EXTREMITY: ICD-10-CM

## 2023-04-26 DIAGNOSIS — E53.8 VITAMIN B12 DEFICIENCY WITHOUT ANEMIA: ICD-10-CM

## 2023-04-26 DIAGNOSIS — I10 HYPERTENSION, UNSPECIFIED TYPE: ICD-10-CM

## 2023-04-26 LAB
ALBUMIN SERPL BCG-MCNC: 4.3 G/DL (ref 3.5–5.2)
ALBUMIN UR-MCNC: NEGATIVE MG/DL
ALP SERPL-CCNC: 112 U/L (ref 35–104)
ALT SERPL W P-5'-P-CCNC: 11 U/L (ref 10–35)
ANION GAP SERPL CALCULATED.3IONS-SCNC: 12 MMOL/L (ref 7–15)
APPEARANCE UR: CLEAR
AST SERPL W P-5'-P-CCNC: 29 U/L (ref 10–35)
BACTERIA #/AREA URNS HPF: ABNORMAL /HPF
BILIRUB SERPL-MCNC: 1 MG/DL
BILIRUB UR QL STRIP: NEGATIVE
BUN SERPL-MCNC: 14.6 MG/DL (ref 8–23)
CALCIUM SERPL-MCNC: 9.6 MG/DL (ref 8.8–10.2)
CHLORIDE SERPL-SCNC: 101 MMOL/L (ref 98–107)
CHOLEST SERPL-MCNC: 233 MG/DL
COLOR UR AUTO: YELLOW
CREAT SERPL-MCNC: 0.71 MG/DL (ref 0.51–0.95)
DEPRECATED HCO3 PLAS-SCNC: 25 MMOL/L (ref 22–29)
ERYTHROCYTE [DISTWIDTH] IN BLOOD BY AUTOMATED COUNT: 13.4 % (ref 10–15)
GFR SERPL CREATININE-BSD FRML MDRD: 89 ML/MIN/1.73M2
GLUCOSE SERPL-MCNC: 95 MG/DL (ref 70–99)
GLUCOSE UR STRIP-MCNC: NEGATIVE MG/DL
HCT VFR BLD AUTO: 39.7 % (ref 35–47)
HDLC SERPL-MCNC: 55 MG/DL
HGB BLD-MCNC: 12.9 G/DL (ref 11.7–15.7)
HGB UR QL STRIP: ABNORMAL
KETONES UR STRIP-MCNC: NEGATIVE MG/DL
LDLC SERPL CALC-MCNC: 153 MG/DL
LEUKOCYTE ESTERASE UR QL STRIP: NEGATIVE
MCH RBC QN AUTO: 30.4 PG (ref 26.5–33)
MCHC RBC AUTO-ENTMCNC: 32.5 G/DL (ref 31.5–36.5)
MCV RBC AUTO: 94 FL (ref 78–100)
MUCOUS THREADS #/AREA URNS LPF: PRESENT /LPF
NITRATE UR QL: NEGATIVE
NONHDLC SERPL-MCNC: 178 MG/DL
PH UR STRIP: 5.5 [PH] (ref 5–7)
PLATELET # BLD AUTO: 339 10E3/UL (ref 150–450)
POTASSIUM SERPL-SCNC: 4.1 MMOL/L (ref 3.4–5.3)
PROT SERPL-MCNC: 7 G/DL (ref 6.4–8.3)
RBC # BLD AUTO: 4.24 10E6/UL (ref 3.8–5.2)
RBC #/AREA URNS AUTO: ABNORMAL /HPF
SODIUM SERPL-SCNC: 138 MMOL/L (ref 136–145)
SP GR UR STRIP: 1.02 (ref 1–1.03)
SQUAMOUS #/AREA URNS AUTO: ABNORMAL /LPF
TRANS CELLS #/AREA URNS HPF: ABNORMAL /HPF
TRIGL SERPL-MCNC: 124 MG/DL
TSH SERPL DL<=0.005 MIU/L-ACNC: 3.29 UIU/ML (ref 0.3–4.2)
UROBILINOGEN UR STRIP-ACNC: 0.2 E.U./DL
VIT B12 SERPL-MCNC: 539 PG/ML (ref 232–1245)
WBC # BLD AUTO: 4.9 10E3/UL (ref 4–11)
WBC #/AREA URNS AUTO: ABNORMAL /HPF

## 2023-04-26 PROCEDURE — 80061 LIPID PANEL: CPT | Performed by: FAMILY MEDICINE

## 2023-04-26 PROCEDURE — G0439 PPPS, SUBSEQ VISIT: HCPCS | Performed by: FAMILY MEDICINE

## 2023-04-26 PROCEDURE — 85027 COMPLETE CBC AUTOMATED: CPT | Performed by: FAMILY MEDICINE

## 2023-04-26 PROCEDURE — 80053 COMPREHEN METABOLIC PANEL: CPT | Performed by: FAMILY MEDICINE

## 2023-04-26 PROCEDURE — 0124A PR ADMIN COVID VAC PFIZER 12+ BIVAL ADDITIONAL: CPT | Performed by: FAMILY MEDICINE

## 2023-04-26 PROCEDURE — 84443 ASSAY THYROID STIM HORMONE: CPT | Performed by: FAMILY MEDICINE

## 2023-04-26 PROCEDURE — 91312 COVID-19 VACCINE BIVALENT BOOSTER 12+ (PFIZER): CPT | Performed by: FAMILY MEDICINE

## 2023-04-26 PROCEDURE — 82607 VITAMIN B-12: CPT | Performed by: FAMILY MEDICINE

## 2023-04-26 PROCEDURE — 81001 URINALYSIS AUTO W/SCOPE: CPT | Performed by: FAMILY MEDICINE

## 2023-04-26 PROCEDURE — 36415 COLL VENOUS BLD VENIPUNCTURE: CPT | Performed by: FAMILY MEDICINE

## 2023-04-26 PROCEDURE — 99214 OFFICE O/P EST MOD 30 MIN: CPT | Mod: 25 | Performed by: FAMILY MEDICINE

## 2023-04-26 RX ORDER — OMEPRAZOLE 40 MG/1
CAPSULE, DELAYED RELEASE ORAL
Qty: 180 CAPSULE | Refills: 3 | Status: SHIPPED | OUTPATIENT
Start: 2023-04-26 | End: 2024-03-29

## 2023-04-26 RX ORDER — LISINOPRIL 5 MG/1
5 TABLET ORAL DAILY
Qty: 90 TABLET | Refills: 3 | Status: SHIPPED | OUTPATIENT
Start: 2023-04-26 | End: 2024-03-29

## 2023-04-26 RX ORDER — CYANOCOBALAMIN 1000 UG/ML
1000 INJECTION, SOLUTION INTRAMUSCULAR; SUBCUTANEOUS
Status: DISCONTINUED | OUTPATIENT
Start: 2023-05-01 | End: 2024-04-03

## 2023-04-26 SDOH — HEALTH STABILITY: PHYSICAL HEALTH: ON AVERAGE, HOW MANY MINUTES DO YOU ENGAGE IN EXERCISE AT THIS LEVEL?: 150+ MIN

## 2023-04-26 SDOH — ECONOMIC STABILITY: TRANSPORTATION INSECURITY
IN THE PAST 12 MONTHS, HAS THE LACK OF TRANSPORTATION KEPT YOU FROM MEDICAL APPOINTMENTS OR FROM GETTING MEDICATIONS?: NO

## 2023-04-26 SDOH — ECONOMIC STABILITY: FOOD INSECURITY: WITHIN THE PAST 12 MONTHS, YOU WORRIED THAT YOUR FOOD WOULD RUN OUT BEFORE YOU GOT MONEY TO BUY MORE.: OFTEN TRUE

## 2023-04-26 SDOH — ECONOMIC STABILITY: INCOME INSECURITY: IN THE LAST 12 MONTHS, WAS THERE A TIME WHEN YOU WERE NOT ABLE TO PAY THE MORTGAGE OR RENT ON TIME?: NO

## 2023-04-26 SDOH — HEALTH STABILITY: PHYSICAL HEALTH: ON AVERAGE, HOW MANY DAYS PER WEEK DO YOU ENGAGE IN MODERATE TO STRENUOUS EXERCISE (LIKE A BRISK WALK)?: 3 DAYS

## 2023-04-26 SDOH — ECONOMIC STABILITY: INCOME INSECURITY: HOW HARD IS IT FOR YOU TO PAY FOR THE VERY BASICS LIKE FOOD, HOUSING, MEDICAL CARE, AND HEATING?: SOMEWHAT HARD

## 2023-04-26 SDOH — ECONOMIC STABILITY: TRANSPORTATION INSECURITY
IN THE PAST 12 MONTHS, HAS LACK OF TRANSPORTATION KEPT YOU FROM MEETINGS, WORK, OR FROM GETTING THINGS NEEDED FOR DAILY LIVING?: NO

## 2023-04-26 SDOH — ECONOMIC STABILITY: FOOD INSECURITY: WITHIN THE PAST 12 MONTHS, THE FOOD YOU BOUGHT JUST DIDN'T LAST AND YOU DIDN'T HAVE MONEY TO GET MORE.: OFTEN TRUE

## 2023-04-26 ASSESSMENT — ENCOUNTER SYMPTOMS
EYE PAIN: 0
NAUSEA: 1
HEARTBURN: 1
PALPITATIONS: 0
HEMATURIA: 0
HEADACHES: 0
CHILLS: 0
HEMATOCHEZIA: 0
DYSURIA: 0
CONSTIPATION: 0
JOINT SWELLING: 1
FEVER: 0
WEAKNESS: 0
BREAST MASS: 0
SHORTNESS OF BREATH: 0
FREQUENCY: 0
PARESTHESIAS: 0
ARTHRALGIAS: 1
SORE THROAT: 0
COUGH: 0
NERVOUS/ANXIOUS: 0
DIARRHEA: 0
ABDOMINAL PAIN: 0
MYALGIAS: 0
DIZZINESS: 0

## 2023-04-26 ASSESSMENT — ACTIVITIES OF DAILY LIVING (ADL): CURRENT_FUNCTION: NO ASSISTANCE NEEDED

## 2023-04-26 ASSESSMENT — LIFESTYLE VARIABLES
HOW OFTEN DO YOU HAVE SIX OR MORE DRINKS ON ONE OCCASION: NEVER
SKIP TO QUESTIONS 9-10: 1
HOW OFTEN DO YOU HAVE A DRINK CONTAINING ALCOHOL: NEVER
AUDIT-C TOTAL SCORE: 0
HOW MANY STANDARD DRINKS CONTAINING ALCOHOL DO YOU HAVE ON A TYPICAL DAY: PATIENT DOES NOT DRINK

## 2023-04-26 ASSESSMENT — PAIN SCALES - GENERAL: PAINLEVEL: SEVERE PAIN (6)

## 2023-04-26 ASSESSMENT — SOCIAL DETERMINANTS OF HEALTH (SDOH)
HOW OFTEN DO YOU GET TOGETHER WITH FRIENDS OR RELATIVES?: TWICE A WEEK
HOW OFTEN DO YOU ATTEND CHURCH OR RELIGIOUS SERVICES?: NEVER
DO YOU BELONG TO ANY CLUBS OR ORGANIZATIONS SUCH AS CHURCH GROUPS UNIONS, FRATERNAL OR ATHLETIC GROUPS, OR SCHOOL GROUPS?: NO
IN A TYPICAL WEEK, HOW MANY TIMES DO YOU TALK ON THE PHONE WITH FAMILY, FRIENDS, OR NEIGHBORS?: MORE THAN THREE TIMES A WEEK

## 2023-04-26 NOTE — PATIENT INSTRUCTIONS
Patient Education   Personalized Prevention Plan  You are due for the preventive services outlined below.  Your care team is available to assist you in scheduling these services.  If you have already completed any of these items, please share that information with your care team to update in your medical record.  Health Maintenance Due   Topic Date Due     Mammogram  11/01/2018     Diptheria Tetanus Pertussis (DTAP/TDAP/TD) Vaccine (2 - Td or Tdap) 01/01/2020     Colorectal Cancer Screening  01/13/2023     Your Health Risk Assessment indicates you feel you are not in good health    A healthy lifestyle helps keep the body fit and the mind alert. It helps protect you from disease, helps you fight disease, and helps prevent chronic disease (disease that doesn't go away) from getting worse. This is important as you get older and begin to notice twinges in muscles and joints and a decline in the strength and stamina you once took for granted. A healthy lifestyle includes good healthcare, good nutrition, weight control, recreation, and regular exercise. Avoid harmful substances and do what you can to keep safe. Another part of a healthy lifestyle is stay mentally active and socially involved.    Good healthcare     Have a wellness visit every year.     If you have new symptoms, let us know right away. Don't wait until the next checkup.     Take medicines exactly as prescribed and keep your medicines in a safe place. Tell us if your medicine causes problems.   Healthy diet and weight control     Eat 3 or 4 small, nutritious, low-fat, high-fiber meals a day. Include a variety of fruits, vegetables, and whole-grain foods.     Make sure you get enough calcium in your diet. Calcium, vitamin D, and exercise help prevent osteoporosis (bone thinning).     If you live alone, try eating with others when you can. That way you get a good meal and have company while you eat it.     Try to keep a healthy weight. If you eat more  calories than your body uses for energy, it will be stored as fat and you will gain weight.     Recreation   Recreation is not limited to sports and team events. It includes any activity that provides relaxation, interest, enjoyment, and exercise. Recreation provides an outlet for physical, mental, and social energy. It can give a sense of worth and achievement. It can help you stay healthy.    Mental Exercise and Social Involvement  Mental and emotional health is as important as physical health. Keep in touch with friends and family. Stay as active as possible. Continue to learn and challenge yourself.   Things you can do to stay mentally active are:    Learn something new, like a foreign language or musical instrument.     Play SCRABBLE or do crossword puzzles. If you cannot find people to play these games with you at home, you can play them with others on your computer through the Internet.     Join a games club--anything from card games to chess or checkers or lawn bowling.     Start a new hobby.     Go back to school.     Volunteer.     Read.   Keep up with world events.    Understanding USDA MyPlate  The USDA has guidelines to help you make healthy food choices. These are called MyPlate. MyPlate shows the food groups that make up healthy meals using the image of a place setting. Before you eat, think about the healthiest choices for what to put on your plate or in your cup or bowl. To learn more about building a healthy plate, visit www.choosemyplate.gov.     The food groups    Fruits. Any fruit or 100% fruit juice counts as part of the Fruit Group. Fruits may be fresh, canned, frozen, or dried, and may be whole, cut-up, or pureed. Make 1/2 of your plate fruits and vegetables.    Vegetables. Any vegetable or 100% vegetable juice counts as a member of the Vegetable Group. Vegetables may be fresh, frozen, canned, or dried. They can be served raw or cooked and may be whole, cut-up, or mashed. Make 1/2 of your  plate fruits and vegetables.    Grains. All foods made from grains are part of the Grains Group. These include wheat, rice, oats, cornmeal, and barley. Grains are often used to make foods such as bread, pasta, oatmeal, cereal, tortillas, and grits. Grains should be no more than 1/4 of your plate. At least half of your grains should be whole grains.    Protein. This group includes meat, poultry, seafood, beans and peas, eggs, processed soy products (such as tofu), nuts (including nut butters), and seeds. Make protein choices no more than 1/4 of your plate. Meat and poultry choices should be lean or low fat.    Dairy. The Dairy Group includes all fluid milk products and foods made from milk that contain calcium, such as yogurt and cheese. (Foods that have little calcium, such as cream, butter, and cream cheese, are not part of this group.) Most dairy choices should be low-fat or fat-free.    Oils. Oils aren't a food group, but they do contain essential nutrients. However it's important to watch your intake of oils. These are fats that are liquid at room temperature. They include canola, corn, olive, soybean, vegetable, and sunflower oil. Foods that are mainly oil include mayonnaise, certain salad dressings, and soft margarines. You likely already get your daily oil allowance from the foods you eat.  Things to limit  Eating healthy also means limiting these things in your diet:    Salt (sodium). Many processed foods have a lot of sodium. To keep sodium intake down, eat fresh vegetables, meats, poultry, and seafood when possible. Purchase low-sodium, reduced-sodium, or no-salt-added food products at the store. And don't add salt to your meals at home. Instead, season them with herbs and spices such as dill, oregano, cumin, and paprika. Or try adding flavor with lemon or lime zest and juice.    Saturated fat. Saturated fats are most often found in animal products such as beef, pork, and chicken. They are often solid at  room temperature, such as butter. To reduce your saturated fat intake, choose leaner cuts of meat and poultry. And try healthier cooking methods such as grilling, broiling, roasting, or baking. For a simple lower-fat swap, use plain nonfat yogurt instead of mayonnaise when making potato salad or macaroni salad.    Added sugars. These are sugars added to foods. They are in foods such as ice cream, candy, soda, fruit drinks, sports drinks, energy drinks, cookies, pastries, jams, and syrups. Cut down on added sugars by sharing sweet treats with a family member or friend. You can also choose fruit for dessert, and drink water or other unsweetened beverages.  [employer] last reviewed this educational content on 6/1/2020 2000-2022 [employer]. All rights reserved. This information is not intended as a substitute for professional medical care. Always follow your healthcare professional's instructions.        Your Health Risk Assessment indicates you feel you are not in good emotional health.    Recreation   Recreation is not limited to sports and team events. It includes any activity that provides relaxation, interest, enjoyment, and exercise. Recreation provides an outlet for physical, mental, and social energy. It can give a sense of worth and achievement. It can help you stay healthy.    Mental Exercise and Social Involvement  Mental and emotional health is as important as physical health. Keep in touch with friends and family. Stay as active as possible. Continue to learn and challenge yourself.   Things you can do to stay mentally active are:    Learn something new, like a foreign language or musical instrument.     Play SCRABBLE or do crossword puzzles. If you cannot find people to play these games with you at home, you can play them with others on your computer through the Internet.     Join a games club--anything from card games to chess or checkers or lawn bowling.     Start a new hobby.     Go back  to school.     Volunteer.     Read.   Keep up with world events.
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
## 2023-04-28 ENCOUNTER — NURSE TRIAGE (OUTPATIENT)
Dept: NURSING | Facility: CLINIC | Age: 73
End: 2023-04-28
Payer: MEDICARE

## 2023-04-28 NOTE — TELEPHONE ENCOUNTER
Call from patient re: lab results    Patient informed of message by Dr. Sharma and that a lab letter was sent out.  Patient verbalized understanding.      Dakota Chase RN, BSN  Triage Nurse Advisor      Reason for Disposition    [1] Other NON-URGENT information for PCP AND [2] does not require PCP response    Protocols used: PCP CALL - NO TRIAGE-A-

## 2023-05-02 ENCOUNTER — ALLIED HEALTH/NURSE VISIT (OUTPATIENT)
Dept: FAMILY MEDICINE | Facility: CLINIC | Age: 73
End: 2023-05-02
Payer: MEDICARE

## 2023-05-02 DIAGNOSIS — E53.8 VITAMIN B12 DEFICIENCY WITHOUT ANEMIA: Primary | ICD-10-CM

## 2023-05-02 PROCEDURE — 99207 PR NO CHARGE NURSE ONLY: CPT

## 2023-05-02 PROCEDURE — 96372 THER/PROPH/DIAG INJ SC/IM: CPT | Performed by: FAMILY MEDICINE

## 2023-05-02 RX ADMIN — CYANOCOBALAMIN 1000 MCG: 1000 INJECTION, SOLUTION INTRAMUSCULAR; SUBCUTANEOUS at 13:40

## 2023-05-02 NOTE — PROGRESS NOTES
Clinic Administered Medication Documentation      Injectable Medication Documentation    Is there an active order (written within the past 365 days, with administrations remaining, not ) in the chart? Yes.     Patient was given Cyanocobalamin (B-12). Prior to medication administration, verified patient's identity using patient s name and date of birth. Please see MAR and medication order for additional information. Patient instructed to remain in clinic for 15 minutes and report any adverse reaction to staff immediately.    Vial/Syringe: Single dose vial. Was entire vial of medication used? Yes  Was this medication supplied by the patient? No  Is this a medication the patient will need to receive again? Yes. Verified that the patient has refills remaining in their prescription.  Seema Walker, CMA

## 2023-05-03 ENCOUNTER — LAB (OUTPATIENT)
Dept: FAMILY MEDICINE | Facility: CLINIC | Age: 73
End: 2023-05-03

## 2023-05-03 DIAGNOSIS — Z12.11 SCREEN FOR COLON CANCER: ICD-10-CM

## 2023-05-14 LAB — NONINV COLON CA DNA+OCC BLD SCRN STL QL: NORMAL

## 2023-05-22 ENCOUNTER — TELEPHONE (OUTPATIENT)
Dept: FAMILY MEDICINE | Facility: CLINIC | Age: 73
End: 2023-05-22
Payer: MEDICARE

## 2023-05-22 NOTE — TELEPHONE ENCOUNTER
Rec'd Standard Written Order (Rib Belt and Heel & Elbow Protector) from [email or web address]. Placed in PCP basket for review and signature. Fax [phone]    Nadia Balbuena

## 2023-05-23 ENCOUNTER — MEDICAL CORRESPONDENCE (OUTPATIENT)
Dept: HEALTH INFORMATION MANAGEMENT | Facility: CLINIC | Age: 73
End: 2023-05-23

## 2023-05-24 ENCOUNTER — TELEPHONE (OUTPATIENT)
Dept: FAMILY MEDICINE | Facility: CLINIC | Age: 73
End: 2023-05-24
Payer: MEDICARE

## 2023-05-24 NOTE — TELEPHONE ENCOUNTER
"Received form back stating \"Please add diagnosis for both items, ICD-10 codes sign and date, thank you\" New forms placed in provider bin.    Nadia Balbuena     "

## 2023-05-24 NOTE — TELEPHONE ENCOUNTER
Forms/Letter Request    Type of form/letter: Received a 7 page fax from [employer]. Request for diagnosis codes.     Have you been seen for this request:     Do we have the form/letter: yes    Who is the form from? [facility]    Where did/will the form come from? form was faxed in    When is form/letter needed by:     How would you like the form/letter returned: fax    Patient Notified form requests are processed in 3-5 business days:    Okay to leave a detailed message?:

## 2023-06-06 ENCOUNTER — ALLIED HEALTH/NURSE VISIT (OUTPATIENT)
Dept: FAMILY MEDICINE | Facility: CLINIC | Age: 73
End: 2023-06-06
Payer: MEDICARE

## 2023-06-06 DIAGNOSIS — E53.8 VITAMIN B12 DEFICIENCY (NON ANEMIC): Primary | ICD-10-CM

## 2023-06-06 PROCEDURE — 99207 PR NO CHARGE NURSE ONLY: CPT

## 2023-06-06 PROCEDURE — 96372 THER/PROPH/DIAG INJ SC/IM: CPT | Performed by: FAMILY MEDICINE

## 2023-06-06 RX ADMIN — CYANOCOBALAMIN 1000 MCG: 1000 INJECTION, SOLUTION INTRAMUSCULAR; SUBCUTANEOUS at 13:43

## 2023-06-06 NOTE — PROGRESS NOTES
Clinic Administered Medication Documentation      Injectable Medication Documentation    Is there an active order (written within the past 365 days, with administrations remaining, not ) in the chart? Yes.     Patient was given Cyanocobalamin (B-12). Prior to medication administration, verified patient's identity using patient s name and date of birth. Please see MAR and medication order for additional information. Patient instructed to remain in clinic for 15 minutes and report any adverse reaction to staff immediately.    Vial/Syringe: Single dose vial. Was entire vial of medication used? Yes  Was this medication supplied by the patient? No  Is this a medication the patient will need to receive again? Yes. Verified that the patient has refills remaining in their prescription.    Bill Mckoy, JESSICA on 2023 at 1:45 PM

## 2023-06-16 ENCOUNTER — TELEPHONE (OUTPATIENT)
Dept: FAMILY MEDICINE | Facility: CLINIC | Age: 73
End: 2023-06-16
Payer: MEDICARE

## 2023-06-16 NOTE — TELEPHONE ENCOUNTER
Patient Quality Outreach    Patient is due for the following:   Colon Cancer Screening      Topic Date Due     Diptheria Tetanus Pertussis (DTAP/TDAP/TD) Vaccine (2 - Td or Tdap) 01/01/2020       Next Steps:   No follow up needed at this time. Cologuard was completed: The specimen received by the laboratory was not acceptable for testing. The patient will be contacted to initiate a new sample collection.    Type of outreach:    Chart review performed, no outreach needed.    Next Steps:  Reach out within 90 days via Letter.    Max number of attempts reached: No. Will try again in 90 days if patient still on fail list.    Questions for provider review:    None           Luly Murillo, CMA

## 2023-07-03 ENCOUNTER — ALLIED HEALTH/NURSE VISIT (OUTPATIENT)
Dept: FAMILY MEDICINE | Facility: CLINIC | Age: 73
End: 2023-07-03
Payer: MEDICARE

## 2023-07-03 DIAGNOSIS — E53.8 VITAMIN B12 DEFICIENCY (NON ANEMIC): Primary | ICD-10-CM

## 2023-07-03 PROCEDURE — 99207 PR NO CHARGE NURSE ONLY: CPT

## 2023-07-03 PROCEDURE — 96372 THER/PROPH/DIAG INJ SC/IM: CPT | Performed by: FAMILY MEDICINE

## 2023-07-03 RX ADMIN — CYANOCOBALAMIN 1000 MCG: 1000 INJECTION, SOLUTION INTRAMUSCULAR; SUBCUTANEOUS at 13:58

## 2023-08-01 ENCOUNTER — ALLIED HEALTH/NURSE VISIT (OUTPATIENT)
Dept: FAMILY MEDICINE | Facility: CLINIC | Age: 73
End: 2023-08-01
Payer: MEDICARE

## 2023-08-01 DIAGNOSIS — E53.8 VITAMIN B12 DEFICIENCY (NON ANEMIC): Primary | ICD-10-CM

## 2023-08-01 PROCEDURE — 96372 THER/PROPH/DIAG INJ SC/IM: CPT | Performed by: FAMILY MEDICINE

## 2023-08-01 PROCEDURE — 99207 PR NO CHARGE NURSE ONLY: CPT

## 2023-08-01 RX ADMIN — CYANOCOBALAMIN 1000 MCG: 1000 INJECTION, SOLUTION INTRAMUSCULAR; SUBCUTANEOUS at 10:55

## 2023-08-01 NOTE — PROGRESS NOTES
Clinic Administered Medication Documentation      Injectable Medication Documentation    Is there an active order (written within the past 365 days, with administrations remaining, not ) in the chart? Yes.     Patient was given Cyanocobalamin (B-12). Prior to medication administration, verified patient's identity using patient s name and date of birth. Please see MAR and medication order for additional information. Patient instructed to remain in clinic for 15 minutes and report any adverse reaction to staff immediately.    Vial/Syringe: Single dose vial. Was entire vial of medication used? Yes  Was this medication supplied by the patient? No  Is this a medication the patient will need to receive again? Yes. Verified that the patient has refills remaining in their prescription.  Luly Murillo, CMA

## 2023-08-28 ENCOUNTER — TELEPHONE (OUTPATIENT)
Dept: FAMILY MEDICINE | Facility: CLINIC | Age: 73
End: 2023-08-28
Payer: MEDICARE

## 2023-08-28 NOTE — TELEPHONE ENCOUNTER
Patient calling to report that she was given the flu shot at [facility] in [place]. Added to immunization record.    Anna Townsend RN on 8/28/2023 at 12:53 PM

## 2023-08-31 ENCOUNTER — TELEPHONE (OUTPATIENT)
Dept: FAMILY MEDICINE | Facility: CLINIC | Age: 73
End: 2023-08-31

## 2023-08-31 NOTE — LETTER
[facility]  [address] [address] [phone]       August 31, 2023    Keiko Kimball  [address] [address]    Dear Keiko,    We care about your health and have reviewed your health plan and are making recommendations based on this review, to optimize your health.    You are in particular need of attention regarding:  -Breast Cancer Screening  -Colon Cancer Screening    We are recommending that you:  -schedule a MAMMOGRAM which is due.    1 in 8 women will develop invasive breast cancer during her lifetime and it is the most common non-skin cancer in American women.  EARLY detection, new treatments, and a better understanding of the disease have increased survival rates - the 5 year survival rate in the 1960s was 63% and today it is close to 90%.    If you are under/uninsured, we recommend you contact the [facility]. They offer mammograms at no charge or on a sliding fee charge. You can schedule with them at [phone]. Please have them send us the results.      Please disregard this reminder if you have had this exam elsewhere within the last year.  It would be helpful for us to have a copy of your mammogram report in your file so that we can best coordinate your care - please contact us with when your test was done so we can update your record.             -schedule a COLONOSCOPY to look for colon cancer (due every 10 years or 5 years in higher risk situations.)        Colon cancer is now the second leading cause of cancer-related deaths in the [place] for both men and women and there are over 130,000 new cases and 50,000 deaths per year from colon cancer.  Colonoscopies can prevent 90-95% of these deaths.  Problem lesions can be removed before they ever become cancer.  This test is not only looking for cancer, but also getting rid of precancerious lesions.    If you are under/uninsured, we recommend you contact the  [facility] program. [facility] is a free colorectal cancer screening program that provides colonoscopies for eligible under/uninsured [place] men and women. If you are interested in receiving a free colonoscopy, please call [employer] at [phone] (mention code [facility]) to see if you re eligible.      If you do not wish to do a colonoscopy or cannot afford to do one, at this time, there is another option. It is called a FIT test or Fecal Immunochemical Occult Blood Test (take home stool sample kit).  It does not replace the colonoscopy for colorectal cancer screening, but it can detect hidden bleeding in the lower colon.  It does need to be repeated every year and if a positive result is obtained, you would be referred for a colonoscopy.          If you have completed either one of these tests at another facility, please call with the details of when and where the tests were done and if they were normal or not. Or have the records sent to our clinic so that we can best coordinate your care.    In addition, here is a list of due or overdue Health Maintenance reminders.    Health Maintenance Due   Topic Date Due    Diptheria Tetanus Pertussis (DTAP/TDAP/TD) Vaccine (2 - Td or Tdap) 01/01/2020    Colorectal Cancer Screening  01/13/2023       To address the above recommendations, we encourage you to contact us at [phone], via [employer] or by contacting Central Scheduling toll free at [phone] 24 hours a day. They will assist you with finding the most convenient time and location.    Thank you for trusting [facility] and we appreciate the opportunity to serve you.  We look forward to supporting your healthcare needs in the future.    Healthy Regards,    Your [facility] Team

## 2023-08-31 NOTE — LETTER
[facility]  [address] [address] [phone]       September 20, 2023    Keiko Kimball  [address] [address]    Dear Keiko,    We care about your health and have reviewed your health plan and are making recommendations based on this review, to optimize your health.    You are in particular need of attention regarding:  -Breast Cancer Screening  -Colon Cancer Screening    We are recommending that you:  -schedule a MAMMOGRAM which is due.    1 in 8 women will develop invasive breast cancer during her lifetime and it is the most common non-skin cancer in American women.  EARLY detection, new treatments, and a better understanding of the disease have increased survival rates - the 5 year survival rate in the 1960s was 63% and today it is close to 90%.    If you are under/uninsured, we recommend you contact the [facility]. They offer mammograms at no charge or on a sliding fee charge. You can schedule with them at [phone]. Please have them send us the results.      Please disregard this reminder if you have had this exam elsewhere within the last year.  It would be helpful for us to have a copy of your mammogram report in your file so that we can best coordinate your care - please contact us with when your test was done so we can update your record.             -schedule a COLONOSCOPY to look for colon cancer (due every 10 years or 5 years in higher risk situations.)        Colon cancer is now the second leading cause of cancer-related deaths in the [place] for both men and women and there are over 130,000 new cases and 50,000 deaths per year from colon cancer.  Colonoscopies can prevent 90-95% of these deaths.  Problem lesions can be removed before they ever become cancer.  This test is not only looking for cancer, but also getting rid of precancerious lesions.    If you are under/uninsured, we recommend you contact the  [facility] program. [facility] is a free colorectal cancer screening program that provides colonoscopies for eligible under/uninsured [place] men and women. If you are interested in receiving a free colonoscopy, please call [employer] at [phone] (mention code [facility]) to see if you re eligible.      If you do not wish to do a colonoscopy or cannot afford to do one, at this time, there is another option. It is called a FIT test or Fecal Immunochemical Occult Blood Test (take home stool sample kit).  It does not replace the colonoscopy for colorectal cancer screening, but it can detect hidden bleeding in the lower colon.  It does need to be repeated every year and if a positive result is obtained, you would be referred for a colonoscopy.          If you have completed either one of these tests at another facility, please call with the details of when and where the tests were done and if they were normal or not. Or have the records sent to our clinic so that we can best coordinate your care.    In addition, here is a list of due or overdue Health Maintenance reminders.    Health Maintenance Due   Topic Date Due    Diptheria Tetanus Pertussis (DTAP/TDAP/TD) Vaccine (2 - Td or Tdap) 01/01/2020    Colorectal Cancer Screening  01/13/2023    ANNUAL REVIEW OF HM ORDERS  10/11/2023       To address the above recommendations, we encourage you to contact us at [phone], via [employer] or by contacting Central Scheduling toll free at [phone] 24 hours a day. They will assist you with finding the most convenient time and location.    Thank you for trusting [facility] and we appreciate the opportunity to serve you.  We look forward to supporting your healthcare needs in the future.    Healthy Regards,    Your [facility] Team

## 2023-08-31 NOTE — LETTER
[facility]  [address] [address] [phone]       December 27, 2023    Keiko Kimball  [address] [address]    Dear Keiko,    We care about your health and have reviewed your health plan and are making recommendations based on this review, to optimize your health.    You are in particular need of attention regarding:  -Breast Cancer Screening  -Colon Cancer Screening    We are recommending that you:  -schedule a MAMMOGRAM which is due.    1 in 8 women will develop invasive breast cancer during her lifetime and it is the most common non-skin cancer in American women.  EARLY detection, new treatments, and a better understanding of the disease have increased survival rates - the 5 year survival rate in the 1960s was 63% and today it is close to 90%.    If you are under/uninsured, we recommend you contact the [facility]. They offer mammograms at no charge or on a sliding fee charge. You can schedule with them at [phone]. Please have them send us the results.      Please disregard this reminder if you have had this exam elsewhere within the last year.  It would be helpful for us to have a copy of your mammogram report in your file so that we can best coordinate your care - please contact us with when your test was done so we can update your record.             -schedule a COLONOSCOPY to look for colon cancer (due every 10 years or 5 years in higher risk situations.)        Colon cancer is now the second leading cause of cancer-related deaths in the [place] for both men and women and there are over 130,000 new cases and 50,000 deaths per year from colon cancer.  Colonoscopies can prevent 90-95% of these deaths.  Problem lesions can be removed before they ever become cancer.  This test is not only looking for cancer, but also getting rid of precancerious lesions.    If you are under/uninsured, we recommend you contact the  [facility] program. [facility] is a free colorectal cancer screening program that provides colonoscopies for eligible under/uninsured [place] men and women. If you are interested in receiving a free colonoscopy, please call [employer] at [phone] (mention code [facility]Web) to see if you re eligible.      If you do not wish to do a colonoscopy or cannot afford to do one, at this time, there is another option. It is called a FIT test or Fecal Immunochemical Occult Blood Test (take home stool sample kit).  It does not replace the colonoscopy for colorectal cancer screening, but it can detect hidden bleeding in the lower colon.  It does need to be repeated every year and if a positive result is obtained, you would be referred for a colonoscopy.          If you have completed either one of these tests at another facility, please call with the details of when and where the tests were done and if they were normal or not. Or have the records sent to our clinic so that we can best coordinate your care.    In addition, here is a list of due or overdue Health Maintenance reminders.    Health Maintenance Due   Topic Date Due    Diptheria Tetanus Pertussis (DTAP/TDAP/TD) Vaccine (2 - Td or Tdap) 01/01/2020       To address the above recommendations, we encourage you to contact us at [phone], via [employer] or by contacting Central Scheduling toll free at [phone] 24 hours a day. They will assist you with finding the most convenient time and location.    Thank you for trusting [facility] and we appreciate the opportunity to serve you.  We look forward to supporting your healthcare needs in the future.    Healthy Regards,    Your [facility] Team

## 2023-08-31 NOTE — TELEPHONE ENCOUNTER
Patient Quality Outreach    Patient is due for the following:   Colon Cancer Screening  Breast Cancer Screening - Mammogram    Next Steps:   No follow up needed at this time.    Type of outreach:    Sent letter.      Questions for provider review:    None           Patsy Solitario MA

## 2023-09-01 ENCOUNTER — ALLIED HEALTH/NURSE VISIT (OUTPATIENT)
Dept: FAMILY MEDICINE | Facility: CLINIC | Age: 73
End: 2023-09-01
Payer: MEDICARE

## 2023-09-01 DIAGNOSIS — E53.8 VITAMIN B12 DEFICIENCY (NON ANEMIC): Primary | ICD-10-CM

## 2023-09-01 PROCEDURE — 99207 PR NO CHARGE NURSE ONLY: CPT

## 2023-09-01 PROCEDURE — 96372 THER/PROPH/DIAG INJ SC/IM: CPT | Performed by: FAMILY MEDICINE

## 2023-09-01 RX ADMIN — CYANOCOBALAMIN 1000 MCG: 1000 INJECTION, SOLUTION INTRAMUSCULAR; SUBCUTANEOUS at 13:52

## 2023-09-20 NOTE — TELEPHONE ENCOUNTER
Patient Quality Outreach    Patient is due for the following:   Colon Cancer Screening  Breast Cancer Screening - Mammogram    Next Steps:   No follow up needed at this time.    Type of outreach:    Sent letter.    Next Steps:  Reach out within 90 days via Letter.    Max number of attempts reached: Yes. Will try again in 90 days if patient still on fail list.    Questions for provider review:    None           Patsy Solitario MA

## 2023-10-03 ENCOUNTER — ALLIED HEALTH/NURSE VISIT (OUTPATIENT)
Dept: FAMILY MEDICINE | Facility: CLINIC | Age: 73
End: 2023-10-03
Payer: MEDICARE

## 2023-10-03 DIAGNOSIS — E53.8 VITAMIN B12 DEFICIENCY WITHOUT ANEMIA: Primary | ICD-10-CM

## 2023-10-03 PROCEDURE — 99207 PR NO CHARGE NURSE ONLY: CPT

## 2023-10-03 PROCEDURE — 96372 THER/PROPH/DIAG INJ SC/IM: CPT | Performed by: FAMILY MEDICINE

## 2023-10-03 RX ADMIN — CYANOCOBALAMIN 1000 MCG: 1000 INJECTION, SOLUTION INTRAMUSCULAR; SUBCUTANEOUS at 13:45

## 2023-10-18 ENCOUNTER — OFFICE VISIT (OUTPATIENT)
Dept: FAMILY MEDICINE | Facility: CLINIC | Age: 73
End: 2023-10-18
Payer: MEDICARE

## 2023-10-18 VITALS
RESPIRATION RATE: 14 BRPM | HEIGHT: 56 IN | HEART RATE: 76 BPM | OXYGEN SATURATION: 98 % | WEIGHT: 141 LBS | TEMPERATURE: 97.5 F | BODY MASS INDEX: 31.72 KG/M2 | DIASTOLIC BLOOD PRESSURE: 84 MMHG | SYSTOLIC BLOOD PRESSURE: 135 MMHG

## 2023-10-18 DIAGNOSIS — G47.00 PERSISTENT INSOMNIA: ICD-10-CM

## 2023-10-18 DIAGNOSIS — G90.511 COMPLEX REGIONAL PAIN SYNDROME TYPE 1 OF RIGHT UPPER EXTREMITY: Primary | ICD-10-CM

## 2023-10-18 DIAGNOSIS — I10 ESSENTIAL HYPERTENSION, BENIGN: ICD-10-CM

## 2023-10-18 DIAGNOSIS — E53.8 VITAMIN B12 DEFICIENCY WITHOUT ANEMIA: ICD-10-CM

## 2023-10-18 PROCEDURE — 99214 OFFICE O/P EST MOD 30 MIN: CPT | Performed by: FAMILY MEDICINE

## 2023-10-18 RX ORDER — TRAZODONE HYDROCHLORIDE 50 MG/1
50 TABLET, FILM COATED ORAL AT BEDTIME
Qty: 90 TABLET | Refills: 3 | Status: SHIPPED | OUTPATIENT
Start: 2023-10-18 | End: 2024-04-03

## 2023-10-18 RX ORDER — RESPIRATORY SYNCYTIAL VIRUS VACCINE 120MCG/0.5
0.5 KIT INTRAMUSCULAR ONCE
Qty: 1 EACH | Refills: 0 | Status: CANCELLED | OUTPATIENT
Start: 2023-10-18 | End: 2023-10-18

## 2023-10-18 NOTE — PROGRESS NOTES
"  Assessment & Plan     Complex regional pain syndrome type 1 of right upper extremity  Refilled, pt using tylenol and medical cannabis, working ok    Essential hypertension, benign  Controlled, cont same    Vitamin B12 deficiency without anemia  Cont monthly shots    Persistent insomnia  Discussed concerns, irritable mood, does not like her living situation, not sleeping well, will add low dose trazodone, ok to start with 1/2 tab every night, may help with mood next day  - traZODone (DESYREL) 50 MG tablet; Take 1 tablet (50 mg) by mouth at bedtime             BMI:   Estimated body mass index is 31.61 kg/m  as calculated from the following:    Height as of this encounter: 1.422 m (4' 8\").    Weight as of this encounter: 64 kg (141 lb).   Weight management plan: Discussed healthy diet and exercise guidelines    Work on weight loss  Regular exercise    Brittnee Sharma MD  [facility] HIEU Aden is a 73 year old, presenting for the following health issues:  Recheck Medication (Talk to her about meds)        10/18/2023     9:59 AM   Additional Questions   Roomed by nola hedrick       History of Present Illness       Hypertension: She presents for follow up of hypertension.  She does check blood pressure  regularly outside of the clinic. Outpatient blood pressures have not been over 140/90. She does not follow a low salt diet.     She eats 2-3 servings of fruits and vegetables daily.She consumes 1 sweetened beverage(s) daily.She exercises with enough effort to increase her heart rate 9 or less minutes per day.  She exercises with enough effort to increase her heart rate 3 or less days per week.   She is taking medications regularly.     Starting to get migraines, electrical feeling on the side and saw a [place] with all different colors for about 20 min, and the headache at the same time, this was new    Not sleeping well, does not want to try meds.    [facility], [facility] " "homes, 2 bedroom, [place], by vivian,  wait list, and she can't wait. She drives and goes to [employer] and stays busy.    Does not want to do the fit again, is done with that.    Thc/cbd- they are 50-50, goes to [facility] for medical cannabis    Pt wants to work on weight loss, but hard due to nausea and limited diet          Review of Systems   CONSTITUTIONAL: NEGATIVE for fever, chills, change in weight  ENT/MOUTH: NEGATIVE for ear, mouth and throat problems  RESP: NEGATIVE for significant cough or SOB  CV: NEGATIVE for chest pain, palpitations or peripheral edema      Objective    /84 (BP Location: Left arm, Patient Position: Sitting, Cuff Size: Adult Large)   Pulse 76   Temp 97.5  F (36.4  C) (Oral)   Resp 14   Ht 1.422 m (4' 8\")   Wt 64 kg (141 lb)   SpO2 98%   BMI 31.61 kg/m    Body mass index is 31.61 kg/m .  Physical Exam   GENERAL: healthy, alert and no distress, pushing, leaning on personal shopping cart to be mobile  NECK: no adenopathy, no asymmetry, masses, or scars and thyroid normal to palpation  RESP: lungs clear to auscultation - no rales, rhonchi or wheezes  CV: regular rate and rhythm, normal S1 S2, no peripheral edema and peripheral pulses strong  ABDOMEN: soft, nontender, no hepatosplenomegaly, no masses and bowel sounds normal    Reviewed last labs, UTD                "

## 2023-11-03 ENCOUNTER — ALLIED HEALTH/NURSE VISIT (OUTPATIENT)
Dept: FAMILY MEDICINE | Facility: CLINIC | Age: 73
End: 2023-11-03
Payer: MEDICARE

## 2023-11-03 DIAGNOSIS — E53.8 VITAMIN B12 DEFICIENCY WITHOUT ANEMIA: Primary | ICD-10-CM

## 2023-11-03 PROCEDURE — 96372 THER/PROPH/DIAG INJ SC/IM: CPT | Performed by: FAMILY MEDICINE

## 2023-11-03 PROCEDURE — 99207 PR NO CHARGE NURSE ONLY: CPT

## 2023-11-03 RX ADMIN — CYANOCOBALAMIN 1000 MCG: 1000 INJECTION, SOLUTION INTRAMUSCULAR; SUBCUTANEOUS at 13:25

## 2023-12-05 ENCOUNTER — ALLIED HEALTH/NURSE VISIT (OUTPATIENT)
Dept: FAMILY MEDICINE | Facility: CLINIC | Age: 73
End: 2023-12-05
Payer: MEDICARE

## 2023-12-05 DIAGNOSIS — E53.8 VITAMIN B12 DEFICIENCY (NON ANEMIC): Primary | ICD-10-CM

## 2023-12-05 PROCEDURE — 99207 PR NO CHARGE NURSE ONLY: CPT

## 2023-12-05 PROCEDURE — 96372 THER/PROPH/DIAG INJ SC/IM: CPT | Performed by: FAMILY MEDICINE

## 2023-12-05 RX ADMIN — CYANOCOBALAMIN 1000 MCG: 1000 INJECTION, SOLUTION INTRAMUSCULAR; SUBCUTANEOUS at 13:25

## 2023-12-05 NOTE — PROGRESS NOTES
Clinic Administered Medication Documentation      Injectable Medication Documentation    Is there an active order (written within the past 365 days, with administrations remaining, not ) in the chart? Yes.     Patient was given Cyanocobalamin (B-12). Prior to medication administration, verified patient's identity using patient s name and date of birth. Please see MAR and medication order for additional information. Patient instructed to remain in clinic for 15 minutes and report any adverse reaction to staff immediately but patient declined and leaves .    Vial/Syringe: Single dose vial. Was entire vial of medication used? Yes  Was this medication supplied by the patient? No  Is this a medication the patient will need to receive again? Yes. Verified that the patient has refills remaining in their prescription.       Violeta Gould MA

## 2024-01-02 ENCOUNTER — ALLIED HEALTH/NURSE VISIT (OUTPATIENT)
Dept: FAMILY MEDICINE | Facility: CLINIC | Age: 74
End: 2024-01-02
Payer: MEDICARE

## 2024-01-02 DIAGNOSIS — E53.8 VITAMIN B12 DEFICIENCY WITHOUT ANEMIA: Primary | ICD-10-CM

## 2024-01-02 PROCEDURE — 96372 THER/PROPH/DIAG INJ SC/IM: CPT | Performed by: FAMILY MEDICINE

## 2024-01-02 PROCEDURE — 99207 PR NO CHARGE NURSE ONLY: CPT

## 2024-01-02 RX ADMIN — CYANOCOBALAMIN 1000 MCG: 1000 INJECTION, SOLUTION INTRAMUSCULAR; SUBCUTANEOUS at 13:25

## 2024-01-02 NOTE — PROGRESS NOTES
Clinic Administered Medication Documentation      Injectable Medication Documentation    Is there an active order (written within the past 365 days, with administrations remaining, not ) in the chart? Yes.     Patient was given Cyanocobalamin (B-12). Prior to medication administration, verified patient's identity using patient s name and date of birth. Please see MAR and medication order for additional information. Patient instructed to remain in clinic for 15 minutes and report any adverse reaction to staff immediately.    Vial/Syringe: Single dose vial. Was entire vial of medication used? Yes  Was this medication supplied by the patient? No  Is this a medication the patient will need to receive again? Yes. Verified that the patient has refills remaining in their prescription.      Angelita Terrazas [facility]

## 2024-01-03 ENCOUNTER — TELEPHONE (OUTPATIENT)
Dept: FAMILY MEDICINE | Facility: CLINIC | Age: 74
End: 2024-01-03

## 2024-01-03 NOTE — LETTER
[facility]  [address] [facility] [address] [phone]       April 3, 2024    Keiko Kimball  [address] [place] [id number]    Dear Keiko,    We care about your health and have reviewed your health plan and are making recommendations based on this review, to optimize your health.    You are in particular need of attention regarding:  -Breast Cancer Screening  -Colon Cancer Screening    We are recommending that you:  -schedule a MAMMOGRAM which is due.    1 in 8 women will develop invasive breast cancer during her lifetime and it is the most common non-skin cancer in American women.  EARLY detection, new treatments, and a better understanding of the disease have increased survival rates - the 5 year survival rate in the 1960s was 63% and today it is close to 90%.    If you are under/uninsured, we recommend you contact the [facility]. They offer mammograms at no charge or on a sliding fee charge. You can schedule with them at [phone]. Please have them send us the results.      Please disregard this reminder if you have had this exam elsewhere within the last year.  It would be helpful for us to have a copy of your mammogram report in your file so that we can best coordinate your care - please contact us with when your test was done so we can update your record.             -schedule a COLONOSCOPY to look for colon cancer (due every 10 years or 5 years in higher risk situations.)        Colon cancer is now the second leading cause of cancer-related deaths in the [place] for both men and women and there are over 130,000 new cases and 50,000 deaths per year from colon cancer.  Colonoscopies can prevent 90-95% of these deaths.  Problem lesions can be removed before they ever become cancer.  This test is not only looking for cancer, but also getting rid of precancerious lesions.    If you are under/uninsured, we recommend you contact the [facility]. [facility] is a free colorectal cancer screening program that provides colonoscopies for eligible under/uninsured [place] men and women. If you are interested in receiving a free colonoscopy, please call [employer] at [phone] (mention code [facility]) to see if you re eligible.      If you do not wish to do a colonoscopy or cannot afford to do one, at this time, there is another option. It is called a FIT test or Fecal Immunochemical Occult Blood Test (take home stool sample kit).  It does not replace the colonoscopy for colorectal cancer screening, but it can detect hidden bleeding in the lower colon.  It does need to be repeated every year and if a positive result is obtained, you would be referred for a colonoscopy.          If you have completed either one of these tests at another facility, please call with the details of when and where the tests were done and if they were normal or not. Or have the records sent to our clinic so that we can best coordinate your care.    In addition, here is a list of due or overdue Health Maintenance reminders.    Health Maintenance Due   Topic Date Due    Diptheria Tetanus Pertussis (DTAP/TDAP/TD) Vaccine (2 - Td or Tdap) 01/01/2020    Annual Wellness Visit  04/26/2024       To address the above recommendations, we encourage you to contact us at [phone], via [employer] or by contacting Central Scheduling toll free at [phone] 24 hours a day. They will assist you with finding the most convenient time and location.    Thank you for trusting [facility] and we appreciate the opportunity to serve you.  We look forward to supporting your healthcare needs in the future.    Healthy Regards,    Your [facility] Team

## 2024-02-01 ENCOUNTER — ALLIED HEALTH/NURSE VISIT (OUTPATIENT)
Dept: FAMILY MEDICINE | Facility: CLINIC | Age: 74
End: 2024-02-01
Payer: MEDICARE

## 2024-02-01 DIAGNOSIS — E53.8 VITAMIN B12 DEFICIENCY (NON ANEMIC): Primary | ICD-10-CM

## 2024-02-01 PROCEDURE — 99207 PR NO CHARGE NURSE ONLY: CPT

## 2024-02-01 PROCEDURE — 96372 THER/PROPH/DIAG INJ SC/IM: CPT | Performed by: FAMILY MEDICINE

## 2024-02-01 RX ADMIN — CYANOCOBALAMIN 1000 MCG: 1000 INJECTION, SOLUTION INTRAMUSCULAR; SUBCUTANEOUS at 14:31

## 2024-03-01 ENCOUNTER — ALLIED HEALTH/NURSE VISIT (OUTPATIENT)
Dept: FAMILY MEDICINE | Facility: CLINIC | Age: 74
End: 2024-03-01
Payer: MEDICARE

## 2024-03-01 DIAGNOSIS — E53.8 VITAMIN B12 DEFICIENCY (NON ANEMIC): Primary | ICD-10-CM

## 2024-03-01 PROCEDURE — 99207 PR NO CHARGE NURSE ONLY: CPT

## 2024-03-01 PROCEDURE — 96372 THER/PROPH/DIAG INJ SC/IM: CPT | Performed by: FAMILY MEDICINE

## 2024-03-01 RX ADMIN — CYANOCOBALAMIN 1000 MCG: 1000 INJECTION, SOLUTION INTRAMUSCULAR; SUBCUTANEOUS at 13:38

## 2024-03-01 NOTE — PROGRESS NOTES
Clinic Administered Medication Documentation        Patient was given Cyanocobalamin 1,000 mcg (B12). Prior to medication administration, verified patient's identity using patient s name and date of birth. Please see MAR and medication order for additional information. Patient instructed to report any adverse reaction to staff immediately.    Vial/Syringe: Single dose vial. Was entire vial of medication used? Yes     Violeta Gould MA

## 2024-03-04 ENCOUNTER — TELEPHONE (OUTPATIENT)
Dept: FAMILY MEDICINE | Facility: CLINIC | Age: 74
End: 2024-03-04
Payer: MEDICARE

## 2024-03-04 NOTE — TELEPHONE ENCOUNTER
Medication Question or Refill    Contacts         Type Contact Phone/Fax    03/04/2024 02:48 PM CST Phone (Incoming) Keiko Kimball (Self) [phone] (H)            What medication are you calling about (include dose and sig)?: Cannabis [place]    Preferred Pharmacy:         Controlled Substance Agreement on file:   CSA -- Patient Level:    CSA: None found at the patient level.       Who prescribed the medication?:     Do you need a refill? Yes    When did you use the medication last? 3/2/24    Patient offered an appointment? No    Do you have any questions or concerns?  Yes: Prior auth for cannabis spray to be renewed prescription.        Okay to leave a detailed message?: Yes at Cell number on file:    Telephone Information:   Mobile [phone]

## 2024-03-05 NOTE — TELEPHONE ENCOUNTER
I am assuming she means to be recertified for medical cannabis. I don't prescribe cannabis  I will go on the [place] medical board website and renew her certification.

## 2024-03-05 NOTE — TELEPHONE ENCOUNTER
Called pt and relayed provider message below. Patient was given an opportunity to ask questions, verbalized understanding of plan, and is agreeable.       Zulay Mortensen RN

## 2024-03-29 DIAGNOSIS — K27.9 PEPTIC ULCER: ICD-10-CM

## 2024-03-29 DIAGNOSIS — I10 HYPERTENSION, UNSPECIFIED TYPE: ICD-10-CM

## 2024-03-29 RX ORDER — OMEPRAZOLE 40 MG/1
CAPSULE, DELAYED RELEASE ORAL
Qty: 180 CAPSULE | Refills: 0 | Status: SHIPPED | OUTPATIENT
Start: 2024-03-29 | End: 2024-04-03

## 2024-03-29 RX ORDER — LISINOPRIL 5 MG/1
5 TABLET ORAL DAILY
Qty: 90 TABLET | Refills: 0 | Status: SHIPPED | OUTPATIENT
Start: 2024-03-29 | End: 2024-04-03

## 2024-04-03 ENCOUNTER — OFFICE VISIT (OUTPATIENT)
Dept: FAMILY MEDICINE | Facility: CLINIC | Age: 74
End: 2024-04-03
Payer: MEDICARE

## 2024-04-03 VITALS
TEMPERATURE: 98.1 F | WEIGHT: 140.3 LBS | HEIGHT: 56 IN | DIASTOLIC BLOOD PRESSURE: 81 MMHG | OXYGEN SATURATION: 100 % | HEART RATE: 66 BPM | BODY MASS INDEX: 31.56 KG/M2 | SYSTOLIC BLOOD PRESSURE: 114 MMHG | RESPIRATION RATE: 14 BRPM

## 2024-04-03 DIAGNOSIS — Z23 COVID-19 VACCINE ADMINISTERED: ICD-10-CM

## 2024-04-03 DIAGNOSIS — F43.9 STRESS: ICD-10-CM

## 2024-04-03 DIAGNOSIS — E78.5 HYPERLIPIDEMIA LDL GOAL <100: ICD-10-CM

## 2024-04-03 DIAGNOSIS — M79.671 RIGHT FOOT PAIN: ICD-10-CM

## 2024-04-03 DIAGNOSIS — G47.00 PERSISTENT INSOMNIA: ICD-10-CM

## 2024-04-03 DIAGNOSIS — Z91.81 AT HIGH RISK FOR FALLS: ICD-10-CM

## 2024-04-03 DIAGNOSIS — K27.9 PEPTIC ULCER: ICD-10-CM

## 2024-04-03 DIAGNOSIS — I10 HYPERTENSION, UNSPECIFIED TYPE: ICD-10-CM

## 2024-04-03 DIAGNOSIS — E53.8 VITAMIN B12 DEFICIENCY (NON ANEMIC): ICD-10-CM

## 2024-04-03 DIAGNOSIS — M25.532 LEFT WRIST PAIN: Primary | ICD-10-CM

## 2024-04-03 LAB
ALBUMIN SERPL BCG-MCNC: 4.5 G/DL (ref 3.5–5.2)
ALP SERPL-CCNC: 115 U/L (ref 40–150)
ALT SERPL W P-5'-P-CCNC: 14 U/L (ref 0–50)
ANION GAP SERPL CALCULATED.3IONS-SCNC: 11 MMOL/L (ref 7–15)
AST SERPL W P-5'-P-CCNC: 28 U/L (ref 0–45)
BILIRUB SERPL-MCNC: 0.6 MG/DL
BUN SERPL-MCNC: 15.9 MG/DL (ref 8–23)
CALCIUM SERPL-MCNC: 9.5 MG/DL (ref 8.8–10.2)
CHLORIDE SERPL-SCNC: 101 MMOL/L (ref 98–107)
CHOLEST SERPL-MCNC: 197 MG/DL
CREAT SERPL-MCNC: 0.71 MG/DL (ref 0.51–0.95)
DEPRECATED HCO3 PLAS-SCNC: 24 MMOL/L (ref 22–29)
EGFRCR SERPLBLD CKD-EPI 2021: 89 ML/MIN/1.73M2
ERYTHROCYTE [DISTWIDTH] IN BLOOD BY AUTOMATED COUNT: 13.9 % (ref 10–15)
FASTING STATUS PATIENT QL REPORTED: ABNORMAL
GLUCOSE SERPL-MCNC: 100 MG/DL (ref 70–99)
HCT VFR BLD AUTO: 37.9 % (ref 35–47)
HDLC SERPL-MCNC: 59 MG/DL
HGB BLD-MCNC: 12.5 G/DL (ref 11.7–15.7)
LDLC SERPL CALC-MCNC: 119 MG/DL
MCH RBC QN AUTO: 28.6 PG (ref 26.5–33)
MCHC RBC AUTO-ENTMCNC: 33 G/DL (ref 31.5–36.5)
MCV RBC AUTO: 87 FL (ref 78–100)
NONHDLC SERPL-MCNC: 138 MG/DL
PLATELET # BLD AUTO: 315 10E3/UL (ref 150–450)
POTASSIUM SERPL-SCNC: 4.3 MMOL/L (ref 3.4–5.3)
PROT SERPL-MCNC: 7 G/DL (ref 6.4–8.3)
RBC # BLD AUTO: 4.37 10E6/UL (ref 3.8–5.2)
SODIUM SERPL-SCNC: 136 MMOL/L (ref 135–145)
TRIGL SERPL-MCNC: 97 MG/DL
WBC # BLD AUTO: 4.8 10E3/UL (ref 4–11)

## 2024-04-03 PROCEDURE — 36415 COLL VENOUS BLD VENIPUNCTURE: CPT | Performed by: FAMILY MEDICINE

## 2024-04-03 PROCEDURE — 99214 OFFICE O/P EST MOD 30 MIN: CPT | Mod: 25 | Performed by: FAMILY MEDICINE

## 2024-04-03 PROCEDURE — 80053 COMPREHEN METABOLIC PANEL: CPT | Performed by: FAMILY MEDICINE

## 2024-04-03 PROCEDURE — 85027 COMPLETE CBC AUTOMATED: CPT | Performed by: FAMILY MEDICINE

## 2024-04-03 PROCEDURE — 80061 LIPID PANEL: CPT | Performed by: FAMILY MEDICINE

## 2024-04-03 PROCEDURE — 96372 THER/PROPH/DIAG INJ SC/IM: CPT | Performed by: FAMILY MEDICINE

## 2024-04-03 RX ORDER — DULOXETIN HYDROCHLORIDE 20 MG/1
20 CAPSULE, DELAYED RELEASE ORAL DAILY
Qty: 90 CAPSULE | Refills: 3 | Status: SHIPPED | OUTPATIENT
Start: 2024-04-03

## 2024-04-03 RX ORDER — RESPIRATORY SYNCYTIAL VISUS VACCINE RECOMBINANT, ADJUVANTED 120MCG/0.5
KIT INTRAMUSCULAR
COMMUNITY
Start: 2023-10-22 | End: 2024-04-03

## 2024-04-03 RX ORDER — PREDNISONE 10 MG/1
10 TABLET ORAL DAILY
Qty: 5 TABLET | Refills: 0 | Status: SHIPPED | OUTPATIENT
Start: 2024-04-03 | End: 2024-09-16

## 2024-04-03 RX ORDER — CYANOCOBALAMIN 1000 UG/ML
1 INJECTION, SOLUTION INTRAMUSCULAR; SUBCUTANEOUS
Qty: 1 ML | Refills: 11 | Status: SHIPPED | OUTPATIENT
Start: 2024-04-03 | End: 2024-04-03

## 2024-04-03 RX ORDER — LISINOPRIL 5 MG/1
5 TABLET ORAL DAILY
Qty: 90 TABLET | Refills: 0 | Status: SHIPPED | OUTPATIENT
Start: 2024-04-03 | End: 2024-07-01

## 2024-04-03 RX ORDER — CYANOCOBALAMIN 1000 UG/ML
1000 INJECTION, SOLUTION INTRAMUSCULAR; SUBCUTANEOUS
Status: ACTIVE | OUTPATIENT
Start: 2024-04-03 | End: 2025-03-29

## 2024-04-03 RX ORDER — OMEPRAZOLE 40 MG/1
CAPSULE, DELAYED RELEASE ORAL
Qty: 180 CAPSULE | Refills: 1 | Status: SHIPPED | OUTPATIENT
Start: 2024-04-03 | End: 2024-09-16

## 2024-04-03 RX ORDER — TRAZODONE HYDROCHLORIDE 50 MG/1
50 TABLET, FILM COATED ORAL AT BEDTIME
Qty: 90 TABLET | Refills: 3 | Status: SHIPPED | OUTPATIENT
Start: 2024-04-03 | End: 2024-09-16

## 2024-04-03 RX ADMIN — CYANOCOBALAMIN 1000 MCG: 1000 INJECTION, SOLUTION INTRAMUSCULAR; SUBCUTANEOUS at 11:28

## 2024-04-03 ASSESSMENT — PAIN SCALES - GENERAL: PAINLEVEL: EXTREME PAIN (9)

## 2024-04-03 NOTE — LETTER
April 4, 2024      Keiko Kimball  [address]   [facility] [address]        Dear ,    We are writing to inform you of your test results.    The cholesterol has improved and looks good. Normal electrolyte panel. The sodium, potassium, sugar and kidneys are all normal   Your cbc is normal, showing no anemia or signs of infection.     Resulted Orders   Lipid panel reflex to direct LDL Fasting   Result Value Ref Range    Cholesterol 197 <200 mg/dL    Triglycerides 97 <150 mg/dL    Direct Measure HDL 59 >=50 mg/dL    LDL Cholesterol Calculated 119 (H) <=100 mg/dL    Non HDL Cholesterol 138 (H) <130 mg/dL    Patient Fasting > 8hrs? Unknown     Narrative    Cholesterol  Desirable:  <200 mg/dL    Triglycerides  Normal:  Less than 150 mg/dL  Borderline High:  150-199 mg/dL  High:  200-499 mg/dL  Very High:  Greater than or equal to 500 mg/dL    Direct Measure HDL  Female:  Greater than or equal to 50 mg/dL   Male:  Greater than or equal to 40 mg/dL    LDL Cholesterol  Desirable:  <100mg/dL  Above Desirable:  100-129 mg/dL   Borderline High:  130-159 mg/dL   High:  160-189 mg/dL   Very High:  >= 190 mg/dL    Non HDL Cholesterol  Desirable:  130 mg/dL  Above Desirable:  130-159 mg/dL  Borderline High:  160-189 mg/dL  High:  190-219 mg/dL  Very High:  Greater than or equal to 220 mg/dL   Comprehensive metabolic panel (BMP + Alb, Alk Phos, ALT, AST, Total. Bili, TP)   Result Value Ref Range    Sodium 136 135 - 145 mmol/L      Comment:      Reference intervals for this test were updated on 09/26/2023 to more accurately reflect our healthy population. There may be differences in the flagging of prior results with similar values performed with this method. Interpretation of those prior results can be made in the context of the updated reference intervals.     Potassium 4.3 3.4 - 5.3 mmol/L    Carbon Dioxide (CO2) 24 22 - 29 mmol/L    Anion Gap 11 7 - 15 mmol/L    Urea Nitrogen 15.9 8.0 - 23.0 mg/dL     Creatinine 0.71 0.51 - 0.95 mg/dL    GFR Estimate 89 >60 mL/min/1.73m2    Calcium 9.5 8.8 - 10.2 mg/dL    Chloride 101 98 - 107 mmol/L    Glucose 100 (H) 70 - 99 mg/dL    Alkaline Phosphatase 115 40 - 150 U/L      Comment:      Reference intervals for this test were updated on 11/14/2023 to more accurately reflect our healthy population. There may be differences in the flagging of prior results with similar values performed with this method. Interpretation of those prior results can be made in the context of the updated reference intervals.    AST 28 0 - 45 U/L      Comment:      Reference intervals for this test were updated on 6/12/2023 to more accurately reflect our healthy population. There may be differences in the flagging of prior results with similar values performed with this method. Interpretation of those prior results can be made in the context of the updated reference intervals.    ALT 14 0 - 50 U/L      Comment:      Reference intervals for this test were updated on 6/12/2023 to more accurately reflect our healthy population. There may be differences in the flagging of prior results with similar values performed with this method. Interpretation of those prior results can be made in the context of the updated reference intervals.      Protein Total 7.0 6.4 - 8.3 g/dL    Albumin 4.5 3.5 - 5.2 g/dL    Bilirubin Total 0.6 <=1.2 mg/dL   CBC with platelets   Result Value Ref Range    WBC Count 4.8 4.0 - 11.0 10e3/uL    RBC Count 4.37 3.80 - 5.20 10e6/uL    Hemoglobin 12.5 11.7 - 15.7 g/dL    Hematocrit 37.9 35.0 - 47.0 %    MCV 87 78 - 100 fL    MCH 28.6 26.5 - 33.0 pg    MCHC 33.0 31.5 - 36.5 g/dL    RDW 13.9 10.0 - 15.0 %    Platelet Count 315 150 - 450 10e3/uL       If you have any questions or concerns, please call the clinic at the number listed above.       Sincerely,      Brittnee Sharma MD

## 2024-04-03 NOTE — PROGRESS NOTES
"      Louise Aden is a 73 year old, presenting for the following health issues:  Follow Up        4/3/2024     9:43 AM   Additional Questions   Roomed by Sandra oRdriguez     History of Present Illness       Hypertension: She presents for follow up of hypertension.  She does not check blood pressure  regularly outside of the clinic. Outpatient blood pressures have not been over 140/90. She does not follow a low salt diet.     Reason for visit:  Left wrist pain    She eats 2-3 servings of fruits and vegetables daily.She consumes 1 sweetened beverage(s) daily.She exercises with enough effort to increase her heart rate 9 or less minutes per day.  She exercises with enough effort to increase her heart rate 3 or less days per week.   She is taking medications regularly.   Medication Followup of Lisinopril 5 mg  Taking Medication as prescribed: yes  Side Effects:  None  Medication Helping Symptoms:  yes          Concern - Left wrist pain     Description: PT reports pain started worsening today.   Intensity: moderate, severe  Progression of Symptoms:  worsening, no injury, but unable to use the right hand, so pushes her self up with her left hand/wrist. Thinks is her weakness and refuses to go to PT. Her knees hurts.  CBD spray medical cannabis, 50-50, helps a lot    Right foot pain at the 5th MT joint, pt has large callus as well, no injury    Her mood is down, as she does not leave her apartment  Trazodone for sleep, but only 1/2, and not taking it every night    RSV vaccine dine and it made her sick    She is not sure if she is moving to a new apartment      Review of Systems  Constitutional, HEENT, cardiovascular, pulmonary, gi and gu systems are negative, except as otherwise noted.      Objective    /81 (BP Location: Right arm, Patient Position: Sitting, Cuff Size: Adult Regular)   Pulse 66   Temp 98.1  F (36.7  C) (Oral)   Resp 14   Ht 1.422 m (4' 8\")   Wt 63.6 kg (140 lb 4.8 oz)   SpO2 100%   " Breastfeeding No   BMI 31.45 kg/m    Body mass index is 31.45 kg/m .  Physical Exam   GENERAL: alert and no distress  EYES: Eyes grossly normal to inspection,  and conjunctivae and sclerae normal  RESP: lungs clear to auscultation - no rales, rhonchi or wheezes  CV: regular rate and rhythm, normal S1 S2, no S3 or S4, no murmur, click or rub, no peripheral edema  MS:  no edema, has right foot with large callus at MTP joint, flat footed as well  Left wrist is slightly swollen, extem tenderness to light touch, but fairly good ROM, thumb movement is normal, not red , no deformity noted  SKIN: no suspicious lesions or rashes  NEURO: Normal strength and tone, mentation intact and speech normal  PSYCH: mentation appears normal, affect normal/bright  Pt is very stooped, kyphosis noted, no use of right arm  Gait ataxic, but gets up out of chair with a rocking motion and balance is poor to ok when in hallway    Needing B12 shot today        Signed Electronically by: Brittnee Sharma MD

## 2024-04-04 ENCOUNTER — PATIENT OUTREACH (OUTPATIENT)
Dept: CARE COORDINATION | Facility: CLINIC | Age: 74
End: 2024-04-04
Payer: MEDICARE

## 2024-04-04 NOTE — LETTER
[facility] CARE COORDINATION  [address] [place] [address]    April 5, 2024    Keiko Kimball  [address]   [facility] [address]      Dear Keiko,    I am a clinic community health worker who works with Brittnee Sharma MD with the [facility]. I wanted to thank you for spending the time to talk with me.  Below is a description of clinic care coordination and how I can further assist you.       The clinic care coordination team is made up of a registered nurse, , financial resource worker and community health worker who understand the health care system. The goal of clinic care coordination is to help you manage your health and improve access to the health care system. Our team works alongside your provider to assist you in determining your health and social needs. We can help you obtain health care and community resources, providing you with necessary information and education. We can work with you through any barriers and develop a care plan that helps coordinate and strengthen the communication between you and your care team.  Our services are voluntary and are offered without charge to you personally.    Please feel free to contact me with any questions or concerns regarding care coordination and what we can offer.      We are focused on providing you with the highest-quality healthcare experience possible.    Sincerely,     Donna Sims, JOHN, B.S. [facility]  Clinic Care Coordination  [facility]:  [place]  [phone]  [email or web address]

## 2024-04-04 NOTE — PROGRESS NOTES
Clinic Care Coordination Contact  Community Health Worker Initial Outreach    Chart Review: Referral from PCP for lifeline, pt is high risk for falls.    CHW introduced self and role with CC  Patient states that she is not in need of any assistance, she does not believe she is a high fall risk.   CHW educated patient on PCPs concern and resources that are available to prevent any future falls if they were to happen. Patient continues to decline any assistance.   CHW provided contact information and encouraged patient to reach out with any future needs or concerns, patient agrees.    Patient accepts CC: No, patient declines any needs or concerns for CC at this time. Patient will be sent Care Coordination introduction letter for future reference.     JOHN Dixon, B.S. [facility] Care Coordination  [facility]:  [place], [place] and [place]  [phone]  [email or web address]

## 2024-04-04 NOTE — PROGRESS NOTES
Clinic Care Coordination Contact  [facility]/Voicemail    Clinical Data: Care Coordinator Outreach    Outreach Documentation Number of Outreach Attempt   4/4/2024  10:17 AM 1       Left message on patient's voicemail with call back information and requested return call.    Plan: Care Coordinator will try to reach patient again in 1-2 business days.    JOHN Dixon, B.S. [facility] Care Coordination  [facility]:  [place], [place] and [place]  [phone]  [email or web address]

## 2024-04-09 ENCOUNTER — TELEPHONE (OUTPATIENT)
Dept: FAMILY MEDICINE | Facility: CLINIC | Age: 74
End: 2024-04-09
Payer: MEDICARE

## 2024-04-09 DIAGNOSIS — R53.81 PHYSICAL DECONDITIONING: Primary | ICD-10-CM

## 2024-04-09 NOTE — LETTER
April 10, 2024      Re:  Keiko Kimball  [address]   [facility] [address]        To Whom It May Concern,     I am recommending a lift chair with a seat lift mechanism due to lower extremity weakness.      Sincerely,          Brittnee Sharma MD/Annelise Fox CNP

## 2024-04-09 NOTE — TELEPHONE ENCOUNTER
Can you please help pt navigate  snap support ect?  I can order a lift chair due to lower extremity weakness

## 2024-04-09 NOTE — TELEPHONE ENCOUNTER
Dr. Sharma,    Pt calls to request an order/referral for a lift chair. Pt states she had one years ago. Pt reports she had to pay out of pocket using her savings to purchase her new lift chair from [facility]. Pt is requesting that a letter/order be sent to [facility] [employer] so she can receive more snap support. Pt provides phone #:[phone]. Advised patient to reach out to [facility] and see exactly what they require. Pt was seen 4/3/24. Does patient need another visit to discuss? Please advise, thank you!  Julian Dumont, RN on 4/9/2024 at 10:07 AM

## 2024-04-10 ENCOUNTER — TELEPHONE (OUTPATIENT)
Dept: FAMILY MEDICINE | Facility: CLINIC | Age: 74
End: 2024-04-10
Payer: MEDICARE

## 2024-04-10 ENCOUNTER — PATIENT OUTREACH (OUTPATIENT)
Dept: CARE COORDINATION | Facility: CLINIC | Age: 74
End: 2024-04-10
Payer: MEDICARE

## 2024-04-10 NOTE — TELEPHONE ENCOUNTER
ZEUSI - Status Update    Who is Calling: patient    Update: she received her 5th Covid 19 vaccine (pfizer) today 4/10/24 at [facility]    Does caller want a call/response back: No

## 2024-04-10 NOTE — TELEPHONE ENCOUNTER
LVM for pt requesting clarification regarding the DME order that provider placed on TC desk.     Pt returned called stating that what she is requesting is that a letter from Dr Sharma stating that she recommends the chairlift (not the order). Pt stated that she is needing to have it mailed to her as she is wanting her SNAP benefit increased to compensate the $1000.00 she paid out of pocket because she was impatient and wasn't going to wait. Writer advised knowledge of SNAP being for food but unaware of reimbursement for medical equipment. Pt advised she spoke with the SNAP program within the [facility] and that is what they said. Writer asked if pt checked with insurance on eligibility for any reimbursement there.    Advised request for letter would be sent to PCP and then mailed to pt for pt to follow up with whomever she would like regarding reimbursement. (Confirmed pt address).     So routing to provider requesting a letter from provider advising the recommendation for a lift chair with seat lift mechanism.    Once letter is written please send back so that TC can print and mail to pt.    Nadia Balbuena

## 2024-04-10 NOTE — PROGRESS NOTES
Clinic Care Coordination Contact  [facility]/Voicemail    Clinical Data: Care Coordinator Outreach    Outreach Documentation Number of Outreach Attempt   4/10/2024   9:53 AM 1       Left message on patient's voicemail with call back information and requested return call.    Plan: Care Coordinator will try to reach patient again in 1-2 business days.    JOHN Dixon, B.S. [facility] Care Coordination  [facility] Clinics:  [place], [place] and [place]  [phone]  [email or web address]

## 2024-04-11 NOTE — PROGRESS NOTES
Clinic Care Coordination Contact  Care Team Conversations    CHW received call back from patient on 4/10, transferred from TC who is assisting with her ask. Patient is needing a letter from PCP for lift chair, she thinks SNAP program will reimburse her for purchasing the chair. Patient is not open to discussing this further and only wants the letter. TC is working on it and will send when ready.   Patient will contact CHW again if interested in further resources or support from CC.     JOHN Dixon, B.S. [facility] Care Coordination  [facility]:  [place], [place] and [place]  [phone]  [email or web address]

## 2024-05-03 ENCOUNTER — ALLIED HEALTH/NURSE VISIT (OUTPATIENT)
Dept: FAMILY MEDICINE | Facility: CLINIC | Age: 74
End: 2024-05-03
Payer: MEDICARE

## 2024-05-03 DIAGNOSIS — E53.8 VITAMIN B12 DEFICIENCY (NON ANEMIC): Primary | ICD-10-CM

## 2024-05-03 PROCEDURE — 96372 THER/PROPH/DIAG INJ SC/IM: CPT | Performed by: FAMILY MEDICINE

## 2024-05-03 PROCEDURE — 99207 PR NO CHARGE NURSE ONLY: CPT

## 2024-05-03 RX ADMIN — CYANOCOBALAMIN 1000 MCG: 1000 INJECTION, SOLUTION INTRAMUSCULAR; SUBCUTANEOUS at 13:14

## 2024-05-06 ENCOUNTER — TELEPHONE (OUTPATIENT)
Dept: FAMILY MEDICINE | Facility: CLINIC | Age: 74
End: 2024-05-06
Payer: MEDICARE

## 2024-06-03 ENCOUNTER — ALLIED HEALTH/NURSE VISIT (OUTPATIENT)
Dept: FAMILY MEDICINE | Facility: CLINIC | Age: 74
End: 2024-06-03
Payer: MEDICARE

## 2024-06-03 DIAGNOSIS — E53.8 VITAMIN B12 DEFICIENCY (NON ANEMIC): Primary | ICD-10-CM

## 2024-06-03 PROCEDURE — 99207 PR NO CHARGE NURSE ONLY: CPT

## 2024-06-03 PROCEDURE — 96372 THER/PROPH/DIAG INJ SC/IM: CPT | Performed by: FAMILY MEDICINE

## 2024-06-03 RX ADMIN — CYANOCOBALAMIN 1000 MCG: 1000 INJECTION, SOLUTION INTRAMUSCULAR; SUBCUTANEOUS at 14:24

## 2024-06-03 NOTE — PROGRESS NOTES
Clinic Administered Medication Documentation      Injectable Medication Documentation    Is there an active order (written within the past 365 days, with administrations remaining, not ) in the chart? Yes.     Patient was given Cyanocobalamin (B-12). Prior to medication administration, verified patient's identity using patient s name and date of birth. Please see MAR and medication order for additional information. Patient instructed to remain in clinic for 15 minutes and report any adverse reaction to staff immediately.    Vial/Syringe: Single dose vial. Was entire vial of medication used? Yes  Was this medication supplied by the patient? No  Is this a medication the patient will need to receive again? Yes. Verified that the patient has refills remaining in their prescription.

## 2024-06-17 PROBLEM — Z71.89 ADVANCED DIRECTIVES, COUNSELING/DISCUSSION: Status: RESOLVED | Noted: 2017-07-03 | Resolved: 2024-06-17

## 2024-06-26 ENCOUNTER — TRANSFERRED RECORDS (OUTPATIENT)
Dept: HEALTH INFORMATION MANAGEMENT | Facility: CLINIC | Age: 74
End: 2024-06-26
Payer: MEDICARE

## 2024-07-01 DIAGNOSIS — K27.9 PEPTIC ULCER: ICD-10-CM

## 2024-07-01 DIAGNOSIS — I10 HYPERTENSION, UNSPECIFIED TYPE: ICD-10-CM

## 2024-07-01 NOTE — TELEPHONE ENCOUNTER
Patient is in the lateral left side position. Received 6 page fax for Plan/Updated Plan of Progress For Outpatient Rehabilitation for Dr Sharma to complete. Form in the in-basket SD's desk.

## 2024-07-02 RX ORDER — LISINOPRIL 5 MG/1
5 TABLET ORAL DAILY
Qty: 90 TABLET | Refills: 0 | Status: SHIPPED | OUTPATIENT
Start: 2024-07-02 | End: 2024-09-16

## 2024-07-02 RX ORDER — OMEPRAZOLE 40 MG/1
CAPSULE, DELAYED RELEASE ORAL
Qty: 180 CAPSULE | Refills: 1 | OUTPATIENT
Start: 2024-07-02

## 2024-07-03 ENCOUNTER — ALLIED HEALTH/NURSE VISIT (OUTPATIENT)
Dept: FAMILY MEDICINE | Facility: CLINIC | Age: 74
End: 2024-07-03
Payer: MEDICARE

## 2024-07-03 DIAGNOSIS — E53.8 VITAMIN B12 DEFICIENCY (NON ANEMIC): Primary | ICD-10-CM

## 2024-07-03 PROCEDURE — 96372 THER/PROPH/DIAG INJ SC/IM: CPT | Performed by: FAMILY MEDICINE

## 2024-07-03 PROCEDURE — 99207 PR NO CHARGE NURSE ONLY: CPT

## 2024-07-03 RX ADMIN — CYANOCOBALAMIN 1000 MCG: 1000 INJECTION, SOLUTION INTRAMUSCULAR; SUBCUTANEOUS at 09:56

## 2024-07-03 NOTE — PROGRESS NOTES
Clinic Administered Medication Documentation        Patient was given B12 1000mcg. Prior to medication administration, verified patient's identity using patient s name and date of birth. Please see MAR and medication order for additional information. Patient instructed to remain in clinic for 15 minutes and report any adverse reaction to staff immediately.    Vial/Syringe: Single dose vial. Was entire vial of medication used? Yes  Gunjan Lizama

## 2024-08-02 ENCOUNTER — ALLIED HEALTH/NURSE VISIT (OUTPATIENT)
Dept: FAMILY MEDICINE | Facility: CLINIC | Age: 74
End: 2024-08-02
Payer: MEDICARE

## 2024-08-02 DIAGNOSIS — E53.8 B12 DEFICIENCY: Primary | ICD-10-CM

## 2024-08-02 PROCEDURE — 96372 THER/PROPH/DIAG INJ SC/IM: CPT | Performed by: FAMILY MEDICINE

## 2024-08-02 PROCEDURE — 99207 PR NO CHARGE NURSE ONLY: CPT

## 2024-08-02 RX ADMIN — CYANOCOBALAMIN 1000 MCG: 1000 INJECTION, SOLUTION INTRAMUSCULAR; SUBCUTANEOUS at 10:30

## 2024-08-02 NOTE — PROGRESS NOTES
Clinic Administered Medication Documentation      Injectable Medication Documentation    Is there an active order (written within the past 365 days, with administrations remaining, not ) in the chart? Yes.     Patient was given Cyanocobalamin (B-12). Prior to medication administration, verified patient's identity using patient s name and date of birth. Please see MAR and medication order for additional information. Patient instructed to remain in clinic for 15 minutes and report any adverse reaction to staff immediately.    Vial/Syringe: Single dose vial. Was entire vial of medication used? Yes  Was this medication supplied by the patient? No  Is this a medication the patient will need to receive again? Yes. Verified that the patient has refills remaining in their prescription.     Katrin hedrick

## 2024-08-23 ENCOUNTER — TELEPHONE (OUTPATIENT)
Dept: FAMILY MEDICINE | Facility: CLINIC | Age: 74
End: 2024-08-23
Payer: MEDICARE

## 2024-08-23 NOTE — TELEPHONE ENCOUNTER
Forms/Letter Request    Type of form/letter:  Form  Clinical Encounter Notes       Do we have the form/letter: Yes:     Who is the form from? [employer]    Where did/will the form come from? form was faxed in    When is form/letter needed by: N/A    How would you like the form/letter returned: Fax : [phone]    Mariela Garrido Patient Representative

## 2024-08-27 ENCOUNTER — TRANSFERRED RECORDS (OUTPATIENT)
Dept: HEALTH INFORMATION MANAGEMENT | Facility: CLINIC | Age: 74
End: 2024-08-27
Payer: MEDICARE

## 2024-09-03 ENCOUNTER — ALLIED HEALTH/NURSE VISIT (OUTPATIENT)
Dept: FAMILY MEDICINE | Facility: CLINIC | Age: 74
End: 2024-09-03
Payer: MEDICARE

## 2024-09-03 DIAGNOSIS — E53.8 VITAMIN B12 DEFICIENCY (NON ANEMIC): Primary | ICD-10-CM

## 2024-09-03 PROCEDURE — 99207 PR NO CHARGE NURSE ONLY: CPT

## 2024-09-04 ENCOUNTER — TRANSFERRED RECORDS (OUTPATIENT)
Dept: HEALTH INFORMATION MANAGEMENT | Facility: CLINIC | Age: 74
End: 2024-09-04
Payer: MEDICARE

## 2024-09-16 ENCOUNTER — VIRTUAL VISIT (OUTPATIENT)
Dept: FAMILY MEDICINE | Facility: CLINIC | Age: 74
End: 2024-09-16
Payer: MEDICARE

## 2024-09-16 DIAGNOSIS — K27.9 PEPTIC ULCER: ICD-10-CM

## 2024-09-16 DIAGNOSIS — I10 HYPERTENSION, UNSPECIFIED TYPE: ICD-10-CM

## 2024-09-16 PROCEDURE — 99441 PR PHYSICIAN TELEPHONE EVALUATION 5-10 MIN: CPT | Mod: 93 | Performed by: FAMILY MEDICINE

## 2024-09-16 RX ORDER — OMEPRAZOLE 40 MG/1
CAPSULE, DELAYED RELEASE ORAL
Qty: 180 CAPSULE | Refills: 1 | Status: SHIPPED | OUTPATIENT
Start: 2024-09-16

## 2024-09-16 RX ORDER — LISINOPRIL 5 MG/1
5 TABLET ORAL DAILY
Qty: 90 TABLET | Refills: 3 | Status: SHIPPED | OUTPATIENT
Start: 2024-09-16

## 2024-09-16 RX ORDER — DULOXETIN HYDROCHLORIDE 20 MG/1
20 CAPSULE, DELAYED RELEASE ORAL DAILY
Qty: 90 CAPSULE | Refills: 3 | Status: CANCELLED | OUTPATIENT
Start: 2024-09-16

## 2024-09-16 NOTE — PROGRESS NOTES
"Keiko is a 74 year old who is being evaluated via a billable telephone visit.    How would you like to obtain your AVS? Mail a copy  If the video visit is dropped, the invitation should be resent by: Text to cell phone:   Will anyone else be joining your video visit? No  Originating Location (pt. Location): Home    Distant Location (provider location):  On-site    Assessment & Plan     Peptic ulcer  Controlled, continue current medication.  - omeprazole (PRILOSEC) 40 MG DR capsule; TAKE 1 CAPSULE(40 MG) BY MOUTH TWICE DAILY 30 TO 60 MINUTES BEFORE A MEAL    Hypertension, unspecified type  Controlled, continue current medication.  - lisinopril (ZESTRIL) 5 MG tablet; Take 1 tablet (5 mg) by mouth daily.    The longitudinal plan of care for the diagnosis(es)/condition(s) as documented were addressed during this visit. Due to the added complexity in care, I will continue to support Keiko in the subsequent management and with ongoing continuity of care.      BMI  Estimated body mass index is 31.45 kg/m  as calculated from the following:    Height as of 4/3/24: 1.422 m (4' 8\").    Weight as of 4/3/24: 63.6 kg (140 lb 4.8 oz).         See Patient Instructions    Subjective   Keiko is a 74 year old, presenting for the following health issues:  Recheck Medication (Pt would like to refill meds)        9/16/2024     1:31 PM   Additional Questions   Roomed by patricia lee   Accompanied by self     History of Present Illness       Reason for visit:  Med checks    She eats 2-3 servings of fruits and vegetables daily.She consumes 1 sweetened beverage(s) daily.She exercises with enough effort to increase her heart rate 9 or less minutes per day.  She exercises with enough effort to increase her heart rate 3 or less days per week.   She is taking medications regularly.       Medication refills.    No other concerns.      Current Outpatient Medications   Medication Sig Dispense Refill    acetaminophen (TYLENOL) 650 MG CR tablet " "Take 650 mg by mouth every 8 hours as needed       DULoxetine (CYMBALTA) 20 MG capsule Take 1 capsule (20 mg) by mouth daily 90 capsule 3    lisinopril (ZESTRIL) 5 MG tablet Take 1 tablet (5 mg) by mouth daily 90 tablet 0    melatonin 3 MG tablet Take 1 tablet (3 mg) by mouth nightly as needed for sleep 90 tablet 3    Multiple Vitamins-Minerals (PRESERVISION AREDS 2) CAPS Take 240 mg by mouth 2 times daily (with meals)      omeprazole (PRILOSEC) 40 MG DR capsule TAKE 1 CAPSULE(40 MG) BY MOUTH TWICE DAILY 30 TO 60 MINUTES BEFORE A MEAL 180 capsule 1    medical cannabis (Patient's own supply) See Admin Instructions (The purpose of this order is to document that the patient reports taking medical cannabis.  This is not a prescription, and is not used to certify that the patient has a qualifying medical condition.)   Uses topical cream on knees and oral liquid.      predniSONE (DELTASONE) 10 MG tablet Take 1 tablet (10 mg) by mouth daily (Patient not taking: Reported on 9/16/2024) 5 tablet 0    traZODone (DESYREL) 50 MG tablet Take 1 tablet (50 mg) by mouth at bedtime (Patient not taking: Reported on 9/16/2024) 90 tablet 3         Objective    Vitals - Patient Reported  Weight (Patient Reported): 60.8 kg (134 lb)  Height (Patient Reported): 142.2 cm (4' 8\")  BMI (Based on Pt Reported Ht/Wt): 30.04        Physical Exam   General: Alert and no distress //Respiratory: No audible wheeze, cough, or shortness of breath // Psychiatric:  Appropriate affect, tone, and pace of words      Labs reviewed in chart      Phone call duration: 7 minutes  Signed Electronically by: Adelaide Helton MD    "

## 2024-10-02 ENCOUNTER — ALLIED HEALTH/NURSE VISIT (OUTPATIENT)
Dept: FAMILY MEDICINE | Facility: CLINIC | Age: 74
End: 2024-10-02
Payer: MEDICARE

## 2024-10-02 DIAGNOSIS — E53.8 VITAMIN B12 DEFICIENCY (NON ANEMIC): Primary | ICD-10-CM

## 2024-10-02 PROCEDURE — 99207 PR NO CHARGE NURSE ONLY: CPT

## 2024-10-02 PROCEDURE — 96372 THER/PROPH/DIAG INJ SC/IM: CPT | Performed by: FAMILY MEDICINE

## 2024-10-02 RX ADMIN — CYANOCOBALAMIN 1000 MCG: 1000 INJECTION, SOLUTION INTRAMUSCULAR; SUBCUTANEOUS at 14:38

## 2024-10-11 ENCOUNTER — TELEPHONE (OUTPATIENT)
Dept: FAMILY MEDICINE | Facility: CLINIC | Age: 74
End: 2024-10-11
Payer: MEDICARE

## 2024-10-11 NOTE — TELEPHONE ENCOUNTER
Pt calling  and requesting a letter. She is moving to [facility] and they stated they could cut down the lip of her bathtub to make it easier for her to  get in/out but that she needs a doctors letter stating this would be beneficial.    Pt requesting this accomodation due to lower limb weakness.    Pt states understanding she may be required to schedule a visit to discuss this with a provider to discuss this is more detail.    Once letter is created please mail to patients home address and call pt to alert them it has been sent. Okay to leave detailed voicemail.    Routing to pts PCP pool for covering providers to review.    Patsy Foreman RN on 10/11/2024 at 11:36 AM

## 2024-10-11 NOTE — TELEPHONE ENCOUNTER
I would recommend a visit. She has only seen Dr. Sharma and most recently Dr. Julian Helton (but this was virtual and leg weakness does not appear to be discussed). In person visit ideal as strength can be assessed.

## 2024-10-11 NOTE — TELEPHONE ENCOUNTER
"Pt called stating she would like to speak with Dante. I let her know that I am part of the team reporting to Dante and  that I could help her. Pt was wanting to cancel appt on Monday stating \"this is all too much stuff to do for one little indent in the tub\". I stated I understand her frustration but a provider needs to evaluate her and talk to her further about her weakness and struggles in order to write an accurate letter of necessity for the indent in the tub especially since this has not been mentioned to a [place] provider before. Pt acknowledged that this is the the right process but is just frustrated with how much goes into just getting an indent in her bathtub.    Pt states that she will try to make it to the appt on Monday, but will see how the weather is since she walks to the clinic from her home. I advised pt that a visit is recommended in order for her to get this letter. Pt understands but still says \"we will see if I go\". No further questions or concerns from pt.       JAELN AntunezN, RN     [facility]    10/11/2024 at 4:58 PM    "

## 2024-10-14 ENCOUNTER — OFFICE VISIT (OUTPATIENT)
Dept: FAMILY MEDICINE | Facility: CLINIC | Age: 74
End: 2024-10-14
Payer: MEDICARE

## 2024-10-14 VITALS
RESPIRATION RATE: 15 BRPM | BODY MASS INDEX: 28.7 KG/M2 | DIASTOLIC BLOOD PRESSURE: 72 MMHG | HEART RATE: 81 BPM | HEIGHT: 56 IN | OXYGEN SATURATION: 99 % | TEMPERATURE: 98 F | WEIGHT: 127.6 LBS | SYSTOLIC BLOOD PRESSURE: 124 MMHG

## 2024-10-14 DIAGNOSIS — S32.000S COMPRESSION FRACTURE OF LUMBAR VERTEBRA, UNSPECIFIED LUMBAR VERTEBRAL LEVEL, SEQUELA: ICD-10-CM

## 2024-10-14 DIAGNOSIS — M41.55 SCOLIOSIS OF THORACOLUMBAR REGION DUE TO DEGENERATIVE DISEASE OF SPINE IN ADULT: ICD-10-CM

## 2024-10-14 DIAGNOSIS — Z74.09 IMPAIRED MOBILITY: Primary | ICD-10-CM

## 2024-10-14 DIAGNOSIS — M17.0 PRIMARY OSTEOARTHRITIS OF BOTH KNEES: ICD-10-CM

## 2024-10-14 DIAGNOSIS — G90.511 COMPLEX REGIONAL PAIN SYNDROME TYPE 1 OF RIGHT UPPER EXTREMITY: ICD-10-CM

## 2024-10-14 DIAGNOSIS — M79.7 FIBROMYALGIA: ICD-10-CM

## 2024-10-14 DIAGNOSIS — R53.81 PHYSICAL DECONDITIONING: ICD-10-CM

## 2024-10-14 DIAGNOSIS — Z91.81 AT HIGH RISK FOR FALLS: ICD-10-CM

## 2024-10-14 PROCEDURE — G2211 COMPLEX E/M VISIT ADD ON: HCPCS

## 2024-10-14 PROCEDURE — 99213 OFFICE O/P EST LOW 20 MIN: CPT

## 2024-10-14 ASSESSMENT — PAIN SCALES - GENERAL: PAINLEVEL: SEVERE PAIN (7)

## 2024-10-14 NOTE — PROGRESS NOTES
"  Assessment & Plan     (Z74.09) Impaired mobility  (primary encounter diagnosis)  (Z91.81) At high risk for falls  (R53.81) Physical deconditioning  (M17.0) Primary osteoarthritis of both knees  (S32.000S) Compression fracture of lumbar vertebra, unspecified lumbar vertebral level, sequela  (M41.55) Scoliosis of thoracolumbar region due to degenerative disease of spine in adult  (M79.7) Fibromyalgia  (G90.511) Complex regional pain syndrome type 1 of right upper extremity  Comment: Given the complexity of the patient's musculoskeletal nature patient is at an increased risk for falls.  After visit today I feel like it is more than necessary that patient have manipulation made to her bathtub in order to make it is safer appliance for patient to use in her home.  Medical necessity letter provided to patient.  Patient to follow-up if needing additional changes made documentation due to Medicare coverage etc. Patient fully understands and is agreeable with plan of care, at this point patient will follow up as needed unless acute concerns arise in the meantime.      The longitudinal plan of care for the diagnosis(es)/condition(s) as documented were addressed during this visit. Due to the added complexity in care, I will continue to support Keiko in the subsequent management and with ongoing continuity of care.    BMI  Estimated body mass index is 28.61 kg/m  as calculated from the following:    Height as of this encounter: 1.422 m (4' 8\").    Weight as of this encounter: 57.9 kg (127 lb 9.6 oz).     Subjective   Keiko is a 74 year old, presenting for the following health issues:  Letter Request (Evaluation for Letter of Accomodation )        10/14/2024    12:41 PM   Additional Questions   Roomed by Brooke Alexander, EMT-B     History of Present Illness       Reason for visit:  Evaluation for Letter of Accomodation   She is taking medications regularly.     Pt is being seen today to be evaluated to get a letter of " "accomodation to have adjustments made to her bathtub so she can get in and out of it easier. It is currently difficult due to leg weakness.    -Patient would like to have manipulation to bathtub to allow her to utilize easier.   -patient is considered high risk for multiple concerns.    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      Objective    /72 (BP Location: Left arm, Patient Position: Sitting, Cuff Size: Adult Regular)   Pulse 81   Temp 98  F (36.7  C) (Oral)   Resp 15   Ht 1.422 m (4' 8\")   Wt 57.9 kg (127 lb 9.6 oz)   SpO2 99%   BMI 28.61 kg/m    Body mass index is 28.61 kg/m .  Physical Exam  Vitals and nursing note reviewed.   Constitutional:       General: She is not in acute distress.     Appearance: Normal appearance. She is not ill-appearing.   Cardiovascular:      Rate and Rhythm: Normal rate.   Pulmonary:      Effort: Pulmonary effort is normal.   Musculoskeletal:      Comments: Patient noted to hunch while walking, only able to utilize left arm d/t right arm dystrophy. Patient scoliosis noted to affect how patient gait is as well, more of a shuffle appearance.    Skin:     General: Skin is warm and dry.   Neurological:      Mental Status: She is alert.   Psychiatric:         Mood and Affect: Mood " normal.         Behavior: Behavior normal.         Thought Content: Thought content normal.         Judgment: Judgment normal.            Signed Electronically by: REYES Esteban CNP

## 2024-10-14 NOTE — LETTER
October 14, 2024      Keiko Kimball  [address]     [facility] [address]        To Whom It May Concern,   It was my pleasure to see Keiko today. After my evaluation today I deem it necessary to have adjustments made to patient bathtub to prevent patient from having a fall that can occur due to her increased leg weakness from bilateral knee arthritis, severe scoliosis, degenerative disc disease of lower spine, fibromyalgia, and  right arm dystrophy. With manipulations, it will provide patient safer access to utilize her shower.       Sincerely,        REYES Esteban CNP

## 2024-10-17 ENCOUNTER — TELEPHONE (OUTPATIENT)
Dept: FAMILY MEDICINE | Facility: CLINIC | Age: 74
End: 2024-10-17
Payer: MEDICARE

## 2024-10-18 NOTE — TELEPHONE ENCOUNTER
Unfortunately we did not discuss and this medication was taken off of her list d/t not using for 5-6 years. She will need a visit, virtual/telephone is fine.     REYES Esteban CNP

## 2024-10-18 NOTE — TELEPHONE ENCOUNTER
FYI - Status Update    Who is Calling: patient    Update: I want to know if I could get  RX  for Cyclobenzaprine 5 mg. I use to take this and it helped with relaxing my muscles and please sent to [facility] in [place] on [address]    Does caller want a call/response back: Yes     Okay to leave a detailed message?: Yes at Cell number on file:    No relevant phone numbers on file.[phone]

## 2024-10-18 NOTE — TELEPHONE ENCOUNTER
Unfortunately she is going to need a visit to discuss this. It looks like the last time she had this refilled was in 2018 per records. Please help schedule w/ any provider.    REYES Esteban CNP

## 2024-10-21 ENCOUNTER — VIRTUAL VISIT (OUTPATIENT)
Dept: FAMILY MEDICINE | Facility: CLINIC | Age: 74
End: 2024-10-21
Payer: MEDICARE

## 2024-10-21 DIAGNOSIS — M79.10 MUSCLE TENSION PAIN: Primary | ICD-10-CM

## 2024-10-21 PROCEDURE — 99441 PR PHYSICIAN TELEPHONE EVALUATION 5-10 MIN: CPT | Mod: 93

## 2024-10-21 RX ORDER — CYCLOBENZAPRINE HCL 5 MG
5 TABLET ORAL
Qty: 90 TABLET | Refills: 1 | Status: SHIPPED | OUTPATIENT
Start: 2024-10-21

## 2024-10-21 NOTE — PROGRESS NOTES
"Keiko is a 74 year old who is being evaluated via a billable telephone visit.    What phone number would you like to be contacted at? [phone]   How would you like to obtain your AVS? Mail a copy  Originating Location (pt. Location): Home  Distant Location (provider location):  On-site    Assessment & Plan     (M79.10) Muscle tension pain  (primary encounter diagnosis)  Comment: has tolerated in past. Will re order. Discussed medication risks and benefits of Flexeril with patient in detail with patient verbal understanding. Discussed starting low utilizing range 2.5-5 mg as needed at bedtime. Follow up if not noting any improvement. Patient fully understands and is agreeable with plan of care, at this point patient will follow up as needed unless acute concerns arise in the meantime.  Plan: cyclobenzaprine (FLEXERIL) 5 MG tablet      BMI  Estimated body mass index is 28.61 kg/m  as calculated from the following:    Height as of 10/14/24: 1.422 m (4' 8\").    Weight as of 10/14/24: 57.9 kg (127 lb 9.6 oz).     Subjective   Keiko is a 74 year old, presenting for the following health issues:  Recheck Medication (cyclobenzaprine )        10/21/2024     1:20 PM   Additional Questions   Roomed by Brooke Alexander, EMT-B        Pt being seen today to review medication for cyclobenzaprine.  Pt hasn't taken medication because she forgot to get it refilled.     Pt has all over body pain, currently at 7/10 pain level today.    -patient noting worsening muscle tension pain that seems to be exacerbating her fibromyalgia   -patient states flexeril helped her get sleep and relaxed her muscle pain which in turn  helped w/ her fibromyalgia pain  -tolerated flexeril well  -patient also has severe scoliosis which limits her ROM    Review of Systems  Constitutional,musculoskeletal, systems are negative, except as otherwise noted.      Objective    Vitals - Patient Reported  Weight (Patient Reported): 57.2 kg (126 lb)  Height (Patient " "Reported): 142.2 cm (4' 8\")  BMI (Based on Pt Reported Ht/Wt): 28.25  Pain Score: Severe Pain (7)  Pain Loc: Other - see comment (all over body pain)      Physical Exam   General: Alert and no distress //Respiratory: No audible wheeze, cough, or shortness of breath // Psychiatric:  Appropriate affect, tone, and pace of words          Phone call duration: 7 minutes  Signed Electronically by: REYES Esteban CNP    "

## 2024-11-05 ENCOUNTER — ALLIED HEALTH/NURSE VISIT (OUTPATIENT)
Dept: FAMILY MEDICINE | Facility: CLINIC | Age: 74
End: 2024-11-05
Payer: MEDICARE

## 2024-11-05 DIAGNOSIS — E53.8 VITAMIN B12 DEFICIENCY (NON ANEMIC): Primary | ICD-10-CM

## 2024-11-05 PROCEDURE — 99207 PR NO CHARGE NURSE ONLY: CPT

## 2024-11-05 RX ADMIN — CYANOCOBALAMIN 1000 MCG: 1000 INJECTION, SOLUTION INTRAMUSCULAR; SUBCUTANEOUS at 13:05

## 2024-12-05 ENCOUNTER — ALLIED HEALTH/NURSE VISIT (OUTPATIENT)
Dept: FAMILY MEDICINE | Facility: CLINIC | Age: 74
End: 2024-12-05
Payer: MEDICARE

## 2024-12-05 DIAGNOSIS — E53.8 VITAMIN B12 DEFICIENCY (NON ANEMIC): Primary | ICD-10-CM

## 2024-12-05 RX ADMIN — CYANOCOBALAMIN 1000 MCG: 1000 INJECTION, SOLUTION INTRAMUSCULAR; SUBCUTANEOUS at 14:00
